# Patient Record
Sex: FEMALE | Race: WHITE | NOT HISPANIC OR LATINO | Employment: FULL TIME | URBAN - METROPOLITAN AREA
[De-identification: names, ages, dates, MRNs, and addresses within clinical notes are randomized per-mention and may not be internally consistent; named-entity substitution may affect disease eponyms.]

---

## 2018-01-02 ENCOUNTER — OFFICE VISIT CONVERTED (OUTPATIENT)
Dept: FAMILY MEDICINE CLINIC | Facility: CLINIC | Age: 23
End: 2018-01-02
Attending: NURSE PRACTITIONER

## 2018-01-02 ENCOUNTER — CONVERSION ENCOUNTER (OUTPATIENT)
Dept: FAMILY MEDICINE CLINIC | Facility: CLINIC | Age: 23
End: 2018-01-02

## 2018-03-19 ENCOUNTER — OFFICE VISIT CONVERTED (OUTPATIENT)
Dept: FAMILY MEDICINE CLINIC | Facility: CLINIC | Age: 23
End: 2018-03-19
Attending: NURSE PRACTITIONER

## 2018-03-19 ENCOUNTER — CONVERSION ENCOUNTER (OUTPATIENT)
Dept: FAMILY MEDICINE CLINIC | Facility: CLINIC | Age: 23
End: 2018-03-19

## 2018-10-03 ENCOUNTER — OFFICE VISIT CONVERTED (OUTPATIENT)
Dept: FAMILY MEDICINE CLINIC | Facility: CLINIC | Age: 23
End: 2018-10-03
Attending: NURSE PRACTITIONER

## 2018-10-03 ENCOUNTER — CONVERSION ENCOUNTER (OUTPATIENT)
Dept: FAMILY MEDICINE CLINIC | Facility: CLINIC | Age: 23
End: 2018-10-03

## 2018-11-27 ENCOUNTER — OFFICE VISIT CONVERTED (OUTPATIENT)
Dept: FAMILY MEDICINE CLINIC | Facility: CLINIC | Age: 23
End: 2018-11-27
Attending: NURSE PRACTITIONER

## 2019-09-04 ENCOUNTER — HOSPITAL ENCOUNTER (OUTPATIENT)
Dept: URGENT CARE | Facility: CLINIC | Age: 24
Discharge: HOME | End: 2019-09-04
Attending: NURSE PRACTITIONER

## 2019-09-07 LAB
BACTERIA SPEC AEROBE CULT: ABNORMAL
CIPROFLOXACIN SUSC ISLT: <=0.5
CLINDAMYCIN SUSC ISLT: 0.25
DAPTOMYCIN SUSC ISLT: 0.25
DOXYCYCLINE SUSC ISLT: <=0.5
ERYTHROMYCIN SUSC ISLT: <=0.25
GENTAMICIN SUSC ISLT: <=0.5
LEVOFLOXACIN SUSC ISLT: <=0.12
OXACILLIN SUSC ISLT: <=0.25
RIFAMPIN SUSC ISLT: <=0.5
TETRACYCLINE SUSC ISLT: <=1
TMP SMX SUSC ISLT: <=10
VANCOMYCIN SUSC ISLT: 1

## 2019-11-29 ENCOUNTER — HOSPITAL ENCOUNTER (OUTPATIENT)
Dept: URGENT CARE | Facility: CLINIC | Age: 24
Discharge: HOME | End: 2019-11-29
Attending: PHYSICIAN ASSISTANT

## 2019-12-02 LAB — BACTERIA SPEC AEROBE CULT: NORMAL

## 2020-03-20 ENCOUNTER — HOSPITAL ENCOUNTER (OUTPATIENT)
Dept: URGENT CARE | Facility: CLINIC | Age: 25
Discharge: HOME | End: 2020-03-20

## 2020-03-22 LAB — BACTERIA UR CULT: NORMAL

## 2020-11-03 ENCOUNTER — HOSPITAL ENCOUNTER (OUTPATIENT)
Dept: URGENT CARE | Facility: CLINIC | Age: 25
Discharge: HOME | End: 2020-11-03

## 2020-11-06 LAB — SARS-COV-2 RNA SPEC QL NAA+PROBE: NOT DETECTED

## 2020-12-06 ENCOUNTER — HOSPITAL ENCOUNTER (OUTPATIENT)
Dept: URGENT CARE | Facility: CLINIC | Age: 25
Discharge: HOME | End: 2020-12-06
Attending: FAMILY MEDICINE

## 2020-12-07 LAB
C TRACH RRNA CVX QL NAA+PROBE: NOT DETECTED
CONV HIV-1/ HIV-2: NONREACTIVE
N GONORRHOEA DNA SPEC QL NAA+PROBE: NOT DETECTED
RPR SER QL: NORMAL

## 2020-12-08 LAB
CONV HEPATITIS B SURFACE AG W CONFIRMATION RE: NEGATIVE
HAV IGM SERPL QL IA: NEGATIVE
HBV CORE IGM SERPL QL IA: NEGATIVE
HCV AB SER DONR QL: <0.1 S/CO RATIO (ref 0–0.9)

## 2020-12-09 LAB — BACTERIA UR CULT: NORMAL

## 2021-01-27 ENCOUNTER — HOSPITAL ENCOUNTER (OUTPATIENT)
Dept: URGENT CARE | Facility: CLINIC | Age: 26
Discharge: HOME | End: 2021-01-27
Attending: EMERGENCY MEDICINE

## 2021-01-27 LAB — MONONUCLEOSIS TEST, QUAL: NEGATIVE

## 2021-01-28 ENCOUNTER — OFFICE VISIT CONVERTED (OUTPATIENT)
Dept: FAMILY MEDICINE CLINIC | Facility: CLINIC | Age: 26
End: 2021-01-28
Attending: NURSE PRACTITIONER

## 2021-01-28 LAB — BACTERIA SPEC AEROBE CULT: ABNORMAL

## 2021-03-03 ENCOUNTER — TELEMEDICINE CONVERTED (OUTPATIENT)
Dept: FAMILY MEDICINE CLINIC | Facility: CLINIC | Age: 26
End: 2021-03-03
Attending: NURSE PRACTITIONER

## 2021-03-15 ENCOUNTER — HOSPITAL ENCOUNTER (OUTPATIENT)
Dept: URGENT CARE | Facility: CLINIC | Age: 26
Discharge: HOME | End: 2021-03-15
Attending: PHYSICIAN ASSISTANT

## 2021-04-28 ENCOUNTER — CONVERSION ENCOUNTER (OUTPATIENT)
Dept: FAMILY MEDICINE CLINIC | Facility: CLINIC | Age: 26
End: 2021-04-28

## 2021-04-28 ENCOUNTER — OFFICE VISIT CONVERTED (OUTPATIENT)
Dept: FAMILY MEDICINE CLINIC | Facility: CLINIC | Age: 26
End: 2021-04-28
Attending: NURSE PRACTITIONER

## 2021-04-28 LAB
BILIRUB UR QL STRIP: NEGATIVE
COLOR UR: YELLOW
CONV CLARITY OF URINE: CLEAR
CONV PROTEIN IN URINE BY AUTOMATED TEST STRIP: NEGATIVE
CONV UROBILINOGEN IN URINE BY AUTOMATED TEST STRIP: NORMAL
GLUCOSE UR QL: NEGATIVE
HGB UR QL STRIP: NEGATIVE
KETONES UR QL STRIP: NEGATIVE
LEUKOCYTE ESTERASE UR QL STRIP: NORMAL
NITRITE UR QL STRIP: NEGATIVE
PH UR STRIP.AUTO: 7 [PH]
SP GR UR: 1.02

## 2021-04-29 ENCOUNTER — HOSPITAL ENCOUNTER (OUTPATIENT)
Dept: FAMILY MEDICINE CLINIC | Facility: CLINIC | Age: 26
Discharge: HOME | End: 2021-04-29
Attending: NURSE PRACTITIONER

## 2021-04-29 LAB
APPEARANCE UR: CLEAR
BILIRUB UR QL: NEGATIVE
C TRACH RRNA CVX QL NAA+PROBE: NOT DETECTED
COLOR UR: YELLOW
CONV COLLECTION SOURCE (UA): NORMAL
CONV UROBILINOGEN IN URINE BY AUTOMATED TEST STRIP: 0.2 {EHRLICHU}/DL (ref 0.1–1)
GLUCOSE UR QL: NEGATIVE MG/DL
HGB UR QL STRIP: NEGATIVE
KETONES UR QL STRIP: NEGATIVE MG/DL
LEUKOCYTE ESTERASE UR QL STRIP: NEGATIVE
N GONORRHOEA DNA SPEC QL NAA+PROBE: NOT DETECTED
NITRITE UR QL STRIP: NEGATIVE
PH UR STRIP.AUTO: 7 [PH] (ref 5–8)
PROT UR QL: NEGATIVE MG/DL
SP GR UR: 1.02 (ref 1–1.03)

## 2021-05-01 LAB
BACTERIA SPEC AEROBE CULT: NORMAL
BACTERIA UR CULT: NORMAL

## 2021-05-03 LAB
CONV LAST MENSTURAL PERIOD: NORMAL
SPECIMEN SOURCE: NORMAL
SPECIMEN SOURCE: NORMAL
THIN PREP CVX: NORMAL

## 2021-05-10 NOTE — H&P
History and Physical      Patient Name: Shruti Pearl   Patient ID: [id number]   Sex: Female   YOB: 1995    Primary Care Provider: Nicole YUNG   Referring Provider: Nicole YUNG    Visit Date: January 28, 2021    Provider: DILSHAD Odell   [place] [facility] [place]   [place] Address: [address] [place] [address], [address]  [place], [place]  [id number]   Location Phone: [phone]          Chief Complaint  · new patient      History Of Present Illness  Shruti Pearl is a 25 year old /White,  or  female who presents for evaluation and treatment of:      Pt is here to establish care with a new provider, she was a patient of [facility] whom she has not seen in a little over a year. She had her pap smear with that office a year and half ago-WNL. She is willing to radha today.     Labs: Pt is unsure when the last time she had lab work was states probably about a yr ago.    Anxiety/Depression: Pt was seeing [facility] for her anxiety and depression needs. She was told at the beginning of the month that she was being released from their care for missing appoinments. She does need refills on her medications that was prescribed by the practice. She is taking Lexapro and Remeron for depression and insomnia. She feels the dose of Remeron is too high states it takes long to wear off she is groggy in the morning. Decreased dose of Remeron to 7.5mg.    Pt would like to discuss starting birth control. She has been on depo, nexplan and nuva ring in the past. Prescribed Ortho-Tri-Cyclen.    Pt would like to receive her flu vaccine today-administered.           Past Medical History  Disease Name Date Onset Notes   Allergies --  --    Anxiety --  --    Asthma --  --    Bipolar disorder --  --    Depression --  --          Past Surgical History  Procedure Name Date Notes   Cesarian Section --  --          Medication List  Name Date Started Instructions   Lexapro 10 mg  "oral tablet 01/28/2021 take 1 tablet (10 mg) by oral route once daily for 90 days   mirtazapine 7.5 mg oral tablet 01/28/2021 take 1 tablet (7.5 mg) by oral route once daily at bedtime         Allergy List  Allergen Name Date Reaction Notes   amoxicillin --  hives --    erythromycin --  --  stomach cramps   Macrobid --  --  --    PENICILLINS --  --  --          Family Medical History  Disease Name Relative/Age Notes   Diabetes Grandfather (maternal)/  Uncle/   --          Reproductive History  Menstrual   Last Menstrual Period: 01/14/2018         Social History  Finding Status Start/Stop Quantity Notes   Alcohol Never --/-- --  --    Sedentary --  --/-- --  --    Tobacco Former 16/18 4 cigarette day --          Review of Systems  · Constitutional  o Denies  o : fatigue, night sweats  · Eyes  o Denies  o : double vision, blurred vision  · HENT  o Denies  o : vertigo, recent head injury  · Breasts  o Denies  o : abnormal changes in breast size, additional breast symptoms except as noted in the HPI  · Cardiovascular  o Denies  o : chest pain, irregular heart beats  · Respiratory  o Denies  o : shortness of breath, productive cough  · Gastrointestinal  o Denies  o : nausea, vomiting  · Genitourinary  o Denies  o : dysuria, urinary retention  · Integument  o Denies  o : hair growth change, new skin lesions  · Neurologic  o Denies  o : altered mental status, seizures  · Musculoskeletal  o Denies  o : joint swelling, limitation of motion  · Endocrine  o Denies  o : cold intolerance, heat intolerance  · Psychiatric  o Admits  o : anxiety, depression  · Heme-Lymph  o Denies  o : petechiae, lymph node enlargement or tenderness  · Allergic-Immunologic  o Denies  o : frequent illnesses      Vitals  Date Time BP Position Site L\R Cuff Size HR RR TEMP (F) WT  HT  BMI kg/m2 BSA m2 O2 Sat FR L/min FiO2 HC       01/28/2021 11:46 /92 Sitting    95 - R   183lbs 4oz 5'  6\" 29.58 1.97 97 %            Physical " Examination  · Constitutional  o Appearance  o : alert, in no acute distress, well developed, well-nourished  · Eyes  o Vision  o : Conjuntivae: Normal, Sclerae white, Pupils: PERRL, Cornea: Clear, no lesions bilateral  · Ears, Nose, Mouth and Throat  o Ears  o : Ext. Ears: Normal shape, Non tender, EACs: Normal , TMs: Normal, Hearing: intact to conversational voice bilaterally  o Nose  o : No nasal discharge, Mucosa: normal, Septum: midline, Sinuses: Nontender  o Throat  o : Oropharynx: no inflmation or lesions, Tonsils: within normal limits  · Neck  o Inspection/Palpation  o : Supple, no masses or tenderness, no deformities, Trachea: Midline, ROM: with in normal limits, no neck stiffness, no lymphadenopathy  o Thyroid  o : no thyomegaly, no palpabale masses   · Respiratory  o Auscultation of Lungs  o : normal breath sounds throughout  · Cardiovascular  o Heart  o : Regular rate and rhythm, Normal S1,S2 , No cardiac murmers, No S3 or S4 gallop or rubs  · Skin and Subcutaneous Tissue  o General Inspection  o : no rashes , or lesions present, normal skin color, warm and dry  o Digits and Nails  o : no clubbing, cyanosis, deformities or edema present, normal appearing nails  · Neurologic  o Mental Status Examination  o : alert and oriented to time, place, and person. Gait and Station: normal gait, able to stand without difficulty  · Psychiatric  o Judgement and Insight  o : judgment and insight intact  o Mood and Affect  o : normal mood and affect          Assessment  · Allergic rhinitis due to allergen     477.9/J30.9  · Anxiety disorder     300.00/F41.9  · Asthma     493.90/J45.909  · Depression     311/F32.9  · Screening for depression     V79.0/Z13.89  · Need for influenza vaccination     V04.81/Z23  · Screening for lipid disorders     V77.91/Z13.220  · Establishing care with new doctor, encounter for     V65.8/Z76.89  · Screening for thyroid disorder     V77.0/Z13.29  · Routine lab draw     V72.60/Z01.89  · Birth  control counseling     V25.09/Z30.09    Problems Reconciled  Plan  · Orders  o ACO-18: Positive screen for clinical depression using a standardized tool and a follow-up plan documented () - V79.0/Z13.89 - 01/31/2021  o Immunization Admin Fee (Single) ([facility]) (85735) - V04.81/Z23 - 01/28/2021  o Fluzone Quadrivalent Vaccine, age 6 months + (01616) - V04.81/Z23 - 01/28/2021   Vaccine - Influenza; Dose: 0.5; Site: Right Deltoid; Route: Intramuscular; Date: 01/28/2021 12:32:00; Exp: 06/30/2020; Lot: [id number]; Mfg: sanofi pasteur; TradeName: Fluzone Quadrivalent; Administered By: Lisa Medina MA; Comment: N/A  o Physical, Primary Care Panel (CBC, CMP, Lipid, TSH) [facility] (93244, 10836, 03024, 21150) - V77.0/Z13.29, V77.91/Z13.220, V72.60/Z01.89 - 01/28/2021  o Vitamin D (25-Hydroxy) Level (28464) - V72.60/Z01.89 - 01/28/2021  o ACO-18: Positive screen for clinical depression using a standardized tool and a follow-up plan documented () - - 01/28/2021  o ACO-14: Influenza immunization administered or previously received [facility] () - - 01/28/2021  o ACO-39: Current medications updated and reviewed (, 1159F) - - 01/28/2021  o Vitamin B12 level (54306) - V72.60/Z01.89 - 01/28/2021  · Medications  o Ortho Tri-Cyclen (28) 0.18/0.215/0.25 mg-35 mcg (28) oral tablet   SIG: take 1 tablet by oral route once daily   DISP: (28) Tablet with 11 refills  Prescribed on 01/28/2021     o Lexapro 10 mg oral tablet   SIG: take 1 tablet (10 mg) by oral route once daily for 90 days   DISP: (90) Tablet with 0 refills  Adjusted on 01/28/2021     o mirtazapine 7.5 mg oral tablet   SIG: take 1 tablet (7.5 mg) by oral route once daily at bedtime   DISP: (30) Tablet with 0 refills  Adjusted on 01/28/2021     o Medications have been Reconciled  o Transition of Care or Provider Policy  · Instructions  o Depression Screen completed and scanned into the EMR under the designated folder within the patient's documents.  o Today's PHQ-9 result is  __13_  o Patient was educated/instructed on their diagnosis, treatment and medications prior to discharge from the clinic today.  o Patient instructed to seek medical attention urgently for new or worsening symptoms.  o Call the office with any concerns or questions.  o 1 month telehealth  o Electronically Identified Patient Education Materials Provided Electronically  · Disposition  o Call or Return if symptoms worsen or persist.            Electronically Signed by: DILSHAD Odell -Author on February 7, 2021 09:35:33 PM

## 2021-05-14 VITALS
WEIGHT: 183.25 LBS | OXYGEN SATURATION: 97 % | BODY MASS INDEX: 29.45 KG/M2 | DIASTOLIC BLOOD PRESSURE: 92 MMHG | HEIGHT: 66 IN | SYSTOLIC BLOOD PRESSURE: 133 MMHG | HEART RATE: 95 BPM

## 2021-05-14 VITALS
WEIGHT: 191.12 LBS | OXYGEN SATURATION: 100 % | DIASTOLIC BLOOD PRESSURE: 65 MMHG | BODY MASS INDEX: 30.71 KG/M2 | SYSTOLIC BLOOD PRESSURE: 114 MMHG | HEART RATE: 65 BPM | HEIGHT: 66 IN

## 2021-05-14 NOTE — PROGRESS NOTES
Progress Note      Patient Name: Shruti Pealr   Patient ID: [id number]   Sex: Female   YOB: 1995    Primary Care Provider: Nicole YUNG   Referring Provider: Nicole YUNG    Visit Date: April 28, 2021    Provider: DILSHAD Odell   Location: [facility] [place]   Location Address: [address] [place] [address], [address]  [place]  [id number]   Location Phone: [phone]          Chief Complaint  · Annual Exam  · PAP exam  · (Health Maintainence Information Reviewed Under Results)      History Of Present Illness  Last PAP Smear: 07/27/2017.   No current complaints.      Pt states that she thinks she might BV, having white cloudy discharge and smell for over a week. Pt states that she does get them every 2-3 months. States hx of bacterial vaginosis. She has been treated w/Flagyl. She would like to be screened for STDs while in the office today. U/A performed in the office positive for trace leukocytes. Sent off for culture. Admits she has a new partner and would like to be screened for STDs-ordered today.       Past Medical History  Disease Name Date Onset Notes   Allergies --  --    Anxiety --  --    Asthma --  --    Bipolar disorder --  --    Depression --  --          Past Surgical History  Procedure Name Date Notes   Cesarian Section --  --          Medication List  Name Date Started Instructions   Lexapro 10 mg oral tablet 03/03/2021 take 1 tablet (10 mg) by oral route once daily for 90 days   mirtazapine 7.5 mg oral tablet 03/03/2021 take 1 tablet (7.5 mg) by oral route once daily at bedtime for 90 days   Ortho Tri-Cyclen (28) 0.18/0.215/0.25 mg-35 mcg (28) oral tablet 01/28/2021 take 1 tablet by oral route once daily         Allergy List  Allergen Name Date Reaction Notes   amoxicillin --  hives --    erythromycin --  --  stomach cramps   Macrobid --  --  --    PENICILLINS --  --  --        Allergies Reconciled  Family Medical History  Disease Name Relative/Age Notes  "  Diabetes Grandfather (maternal)/  Uncle/   --          Reproductive History  Menstrual   Last Menstrual Period: 01/14/2018         Social History  Finding Status Start/Stop Quantity Notes   Alcohol Never --/-- --  --    Sedentary --  --/-- --  --    Tobacco Former 16/18 4 cigarette day --          Immunizations  NameDate Admin Mfg Trade Name Lot Number Route Inj VIS Given VIS Publication   Ewvhrgydc24/28/2021 [facility] Fluzone Quadrivalent [id number] IM RD 01/28/2021 08/15/2019   Comments:          Review of Systems  · Constitutional  o Denies  o : fatigue, night sweats  · Eyes  o Denies  o : double vision, blurred vision  · HENT  o Denies  o : vertigo, recent head injury  · Breasts  o Denies  o : abnormal changes in breast size, additional breast symptoms except as noted in the HPI  · Cardiovascular  o Denies  o : chest pain, irregular heart beats  · Respiratory  o Denies  o : shortness of breath, productive cough  · Gastrointestinal  o Denies  o : nausea, vomiting  · Genitourinary  o Admits  o : vaginal discharge  o Denies  o : dysuria, urinary retention  · Integument  o Denies  o : hair growth change, new skin lesions  · Neurologic  o Denies  o : altered mental status, seizures  · Musculoskeletal  o Denies  o : joint swelling, limitation of motion  · Endocrine  o Denies  o : cold intolerance, heat intolerance  · Heme-Lymph  o Denies  o : petechiae, lymph node enlargement or tenderness  · Allergic-Immunologic  o Denies  o : frequent illnesses      Vitals  Date Time BP Position Site L\R Cuff Size HR RR TEMP (F) WT  HT  BMI kg/m2 BSA m2 O2 Sat FR L/min FiO2 HC       01/28/2021 11:46 /92 Sitting    95 - R   183lbs 4oz 5'  6\" 29.58 1.97 97 %      04/28/2021 09:58 /65 Sitting    65 - R   191lbs 2oz 5'  6\" 30.85 2.01 100 %  21%          Physical Examination  · Constitutional  o Appearance  o : well-nourished, in no acute distress  · Neck  o Inspection/Palpation  o : normal appearance, no masses or tenderness, " trachea midline  o Thyroid  o : gland size normal, nontender, no nodules or masses present on palpation  · Respiratory  o Respiratory Effort  o : breathing unlabored  o Inspection of Chest  o : normal appearance  o Auscultation of Lungs  o : normal breath sounds throughout  · Cardiovascular  o Heart  o :   § Auscultation of Heart  § : regular rate and rhythm, no murmurs, gallops or rubs  · Breasts  o Inspection of Breasts  o : breasts symmetrical, no skin changes, no deformities present, no discharge present  o Palpation of Breasts, Axillae  o : no masses present on palpation, no breast tenderness  · Gastrointestinal  o Abdominal Examination  o : abdomen nontender to palpation, tone normal without rigidity or guarding, no masses present, normal bowel sounds  · Genitourinary  o External Genitalia  o : no inflammation, no lesions present  o Vagina  o : normal vaginal vault, no discharge present, no inflammatory lesions present, no masses present  o Bladder  o : nontender to palpation  o Cervix  o : appearance healthy, no lesions present, nontender to palpation, no discharges, no bleeding present, normal midline position  o Uterus  o : nontender to palpation, no masses present, position midline/midplane  o Adnexa  o : no tenderness or masses present on bimanual examination  o Anus  o : no inflammation or lesions present  o Perineum  o : perineum within normal limits  · Lymphatic  o Neck  o : no lymphadenopathy present  o Axilla  o : no lymphadenopathy present  o Groin  o : no lymphadenopathy present  · Neurologic  o Mental Status Examination  o :   § Orientation  § : grossly oriented to person, place and time  o Gait and Station  o : normal gait, able to stand without difficulty  · Psychiatric  o Judgment and Insight  o : judgment and insight intact  o Mood and Affect  o : mood normal, affect appropriate          Results  · In-Office Procedures  o Lab procedure  § IOP - Urinalysis without Microscopy (Clinitek) [facility]  (97039)   § Color Ur: Yellow   § Clarity Ur: Clear   § Glucose Ur Ql Strip: Negative   § Bilirub Ur Ql Strip: Negative   § Ketones Ur Ql Strip: Negative   § Sp Gr Ur Qn: 1.025   § Hgb Ur Ql Strip: Negative   § pH Ur-LsCnc: 7.0   § Prot Ur Ql Strip: Negative   § Urobilinogen Ur Strip-mCnc: 0.2 E.U./dL   § Nitrite Ur Ql Strip: Negative   § WBC Est Ur Ql Strip: Trace       Assessment  · Routine gynecological examination     V72.31/Z01.419  · Pap smear, as part of routine gynecological examination     V76.2/Z01.419  · Vaginal Discharge     623.5/N89.8  · Screening for lipid disorders     V77.91/Z13.220  · UTI (urinary tract infection)     599.0/N39.0  · Screening for STD (sexually transmitted disease)     V74.5/Z11.3  · Vaginal odor     625.8/N89.8  · Routine lab draw     V72.60/Z01.89  · Screening for thyroid disorder     V77.0/Z13.29      Plan  · Orders  o Vaginal Screen (Candida, Gardnerella & Trichomonas) (73111) - V72.31/Z01.419 - 04/28/2021  o Pap smear (91702) - V76.2/Z01.419 - 04/28/2021  o STD Panel (HSV 1 and 2 IgG/IgM, HIV, RPR, GC/Chlamydia) [facility] (88870, 31484, CTNGX, 83081, 33391, 21251, ) - V74.5/Z11.3 - 04/28/2021  o Urinalysis with Reflex Microscopy if abnormal ([facility]) (93236) - 599.0/N39.0 - 04/28/2021  o Vaginal culture (05549) - V74.5/Z11.3 - 04/28/2021  o Vaginal Screen (Candida, Gardnerella & Trichomonas) (35001) - V74.5/Z11.3 - 04/28/2021  o Urine Culture [facility] (50296) - 599.0/N39.0, 625.8/N89.8, 623.5/N89.8 - 04/28/2021  o Hepatitis panel (HBsAg, HBsAb, HBc IgM, HBe antigen and antibody) (60175, 73659, 25586, 94570, 60845) - V74.5/Z11.3 - 04/28/2021  o Physical, Primary Care Panel (CBC, CMP, Lipid, TSH) [facility] (76171, 36040, 37692, 35577) - V72.60/Z01.89, V77.0/Z13.29, V77.91/Z13.220 - 04/28/2021  o Vitamin B12 level (59344) - V72.60/Z01.89 - 04/28/2021  o Vitamin D Level (55723) - V72.60/Z01.89 - 04/28/2021  o Chlamydia/GC by PCR (Urine or Vaginal Swab) [facility] (CTNGX) - 625.8/N89.8, 623.5/N89.8 -  04/28/2021  o Herpes simplex virus (HSV) type 1 and 2 DNA panel by PCR (97217) - 625.8/N89.8, 623.5/N89.8 - 04/28/2021  · Medications  o Medications have been Reconciled  o Transition of Care or Provider Policy  · Instructions  o **Pap Test/Liquid Based:   o Thin Prep  o Source:  o Cervix  o **Perform Reflex Human Papilloma Virus (HPV) High Risk on this Pap (If atypical squamous cells of the undetermined signifigcance (ASCUS)/Atypical Glandular Cells of undetermined significance (AGCUS): Low Grade Squamous Intraepitheal lesion (LGSIL): **  o Yes, upon instruction from sending office  o **Is this an annual PAP:  o Yes  o Last Menstrual Period (First Day of): 03/20/2021  o Contraceptive: Birth Control  o Previous Pap date: 07/29/2017  o Normal  o Counseled on monthly breast self exams.   o Counseled on STD prevention.  o Counseled on diet and exercise.   o Counseled on weight-bearing exercise.  o Recommended Calcium with Vitamin D twice daily.  o 6 month follow up  o Electronically Identified Patient Education Materials Provided Electronically  · Disposition  o Call or Return if symptoms worsen or persist.            Electronically Signed by: DILSHAD Odell -Author on April 28, 2021 05:22:47 PM

## 2021-05-14 NOTE — PROGRESS NOTES
Progress Note      Patient Name: Shruti Pearl   Patient ID: [id number]   Sex: Female   YOB: 1995    Primary Care Provider: Nicole YUNG   Referring Provider: Nicole YUNG    Visit Date: March 3, 2021    Provider: DILSHAD Odell   Location: [facility] [place]   Location Address: [address] [place] [address], [address]  [place], [place]  [id number]   Location Phone: [phone]          Chief Complaint     follow up mirtazapine       History Of Present Illness  Video Conferencing Visit  Shruti Pearl is a 26 year old /White,  or  female who is presenting for evaluation via video conferencing via Briefcase. Verbal consent obtained before beginning visit.   The following staff were present during this visit: DILSHAD Odell      depression/anxiety/insomnia-last visit reduced dose of Mirtazepine to 7.5mg-pt complained that the 15mg dose made her too drowsy-she reports she is feeling great since the change in dose. Denies any side effects. She would like to continue the current dose-refilled Mirtazepine.    feels her mood is goo on Lexapro-she is requesting a rf-refilled today.       Past Medical History  Disease Name Date Onset Notes   Allergies --  --    Anxiety --  --    Asthma --  --    Bipolar disorder --  --    Depression --  --          Past Surgical History  Procedure Name Date Notes   Cesarian Section --  --          Medication List  Name Date Started Instructions   Lexapro 10 mg oral tablet 03/03/2021 take 1 tablet (10 mg) by oral route once daily for 90 days   mirtazapine 7.5 mg oral tablet 03/03/2021 take 1 tablet (7.5 mg) by oral route once daily at bedtime for 90 days   Ortho Tri-Cyclen (28) 0.18/0.215/0.25 mg-35 mcg (28) oral tablet 01/28/2021 take 1 tablet by oral route once daily         Allergy List  Allergen Name Date Reaction Notes   amoxicillin --  hives --    erythromycin --  --  stomach cramps   Macrobid --  --  --    PENICILLINS --  --   --          Family Medical History  Disease Name Relative/Age Notes   Diabetes Grandfather (maternal)/  Uncle/   --          Reproductive History  Menstrual   Last Menstrual Period: 01/14/2018         Social History  Finding Status Start/Stop Quantity Notes   Alcohol Never --/-- --  --    Sedentary --  --/-- --  --    Tobacco Former 16/18 4 cigarette day --          Immunizations  NameDate Admin Mfg Trade Name Lot Number Route Inj VIS Given VIS Publication   Nduilklit03/28/2021 [facility] Fluzone Quadrivalent [id number] RD 01/28/2021 08/15/2019   Comments:          Review of Systems  · Constitutional  o Denies  o : fatigue, night sweats  · Eyes  o Denies  o : double vision, blurred vision  · Cardiovascular  o Denies  o : chest pain, irregular heart beats  · Respiratory  o Denies  o : shortness of breath, productive cough  · Endocrine  o Denies  o : cold intolerance, heat intolerance  · Psychiatric  o Admits  o : anxiety, depression, difficulty sleeping      Physical Examination  · Constitutional  o Appearance  o : alert, in no acute distress, well developed, well-nourished  · Neurologic  o Mental Status Examination  o : alert and oriented to time, place, and person. Gait and Station: normal gait, able to stand without difficulty  · Psychiatric  o Judgement and Insight  o : judgment and insight intact  o Mood and Affect  o : normal mood and affect          Assessment  · Anxiety     300.00/F41.9  · Depression     311/F32.9  · Insomnia     780.52/G47.00      Plan  · Instructions  o 6 month follow up  · Disposition  o Call or Return if symptoms worsen or persist.            Electronically Signed by: DILSHAD Odell -Author on March 3, 2021 11:16:32 PM

## 2021-05-16 VITALS
SYSTOLIC BLOOD PRESSURE: 120 MMHG | HEART RATE: 84 BPM | BODY MASS INDEX: 31.28 KG/M2 | TEMPERATURE: 99.1 F | OXYGEN SATURATION: 98 % | WEIGHT: 183.25 LBS | DIASTOLIC BLOOD PRESSURE: 64 MMHG | HEIGHT: 64 IN

## 2021-05-16 VITALS
TEMPERATURE: 98.3 F | SYSTOLIC BLOOD PRESSURE: 113 MMHG | DIASTOLIC BLOOD PRESSURE: 78 MMHG | WEIGHT: 172.37 LBS | RESPIRATION RATE: 16 BRPM | HEART RATE: 72 BPM | OXYGEN SATURATION: 100 % | BODY MASS INDEX: 29.43 KG/M2 | HEIGHT: 64 IN

## 2021-05-16 VITALS
HEART RATE: 82 BPM | WEIGHT: 179.37 LBS | HEIGHT: 64 IN | SYSTOLIC BLOOD PRESSURE: 111 MMHG | TEMPERATURE: 98.9 F | DIASTOLIC BLOOD PRESSURE: 74 MMHG | OXYGEN SATURATION: 100 % | BODY MASS INDEX: 30.62 KG/M2

## 2021-05-16 VITALS
WEIGHT: 174.12 LBS | BODY MASS INDEX: 29.73 KG/M2 | HEIGHT: 64 IN | TEMPERATURE: 98.8 F | HEART RATE: 74 BPM | DIASTOLIC BLOOD PRESSURE: 60 MMHG | OXYGEN SATURATION: 99 % | SYSTOLIC BLOOD PRESSURE: 102 MMHG

## 2021-09-07 ENCOUNTER — HOSPITAL ENCOUNTER (EMERGENCY)
Facility: HOSPITAL | Age: 26
Discharge: HOME | End: 2021-09-08
Attending: EMERGENCY MEDICINE | Admitting: EMERGENCY MEDICINE

## 2021-09-07 ENCOUNTER — APPOINTMENT (OUTPATIENT)
Dept: CT IMAGING | Facility: HOSPITAL | Age: 26
End: 2021-09-07

## 2021-09-07 DIAGNOSIS — K52.9 ENTERITIS: Primary | ICD-10-CM

## 2021-09-07 DIAGNOSIS — R10.31 RIGHT LOWER QUADRANT ABDOMINAL PAIN: ICD-10-CM

## 2021-09-07 DIAGNOSIS — R19.7 DIARRHEA, UNSPECIFIED TYPE: ICD-10-CM

## 2021-09-07 DIAGNOSIS — R11.2 NON-INTRACTABLE VOMITING WITH NAUSEA, UNSPECIFIED VOMITING TYPE: ICD-10-CM

## 2021-09-07 LAB
ALBUMIN SERPL-MCNC: 3.9 G/DL (ref 3.5–5.2)
ALBUMIN/GLOB SERPL: 1.3 G/DL
ALP SERPL-CCNC: 50 U/L (ref 39–117)
ALT SERPL W P-5'-P-CCNC: 19 U/L (ref 1–33)
ANION GAP SERPL CALCULATED.3IONS-SCNC: 11 MMOL/L (ref 5–15)
AST SERPL-CCNC: 18 U/L (ref 1–32)
BACTERIA UR QL AUTO: ABNORMAL /HPF
BASOPHILS # BLD AUTO: 0.01 10*3/MM3 (ref 0–0.2)
BASOPHILS NFR BLD AUTO: 0.1 % (ref 0–1.5)
BILIRUB SERPL-MCNC: 1.6 MG/DL (ref 0–1.2)
BILIRUB UR QL STRIP: NEGATIVE
BUN SERPL-MCNC: 10 MG/DL (ref 6–20)
BUN/CREAT SERPL: 15.4 (ref 7–25)
CALCIUM SPEC-SCNC: 9 MG/DL (ref 8.6–10.5)
CHLORIDE SERPL-SCNC: 106 MMOL/L (ref 98–107)
CLARITY UR: ABNORMAL
CO2 SERPL-SCNC: 21 MMOL/L (ref 22–29)
COLOR UR: YELLOW
CREAT SERPL-MCNC: 0.65 MG/DL (ref 0.57–1)
DEPRECATED RDW RBC AUTO: 36.6 FL (ref 37–54)
EOSINOPHIL # BLD AUTO: 0.11 10*3/MM3 (ref 0–0.4)
EOSINOPHIL NFR BLD AUTO: 1.2 % (ref 0.3–6.2)
ERYTHROCYTE [DISTWIDTH] IN BLOOD BY AUTOMATED COUNT: 12.1 % (ref 12.3–15.4)
GFR SERPL CREATININE-BSD FRML MDRD: 111 ML/MIN/1.73
GLOBULIN UR ELPH-MCNC: 2.9 GM/DL
GLUCOSE SERPL-MCNC: 88 MG/DL (ref 65–99)
GLUCOSE UR STRIP-MCNC: NEGATIVE MG/DL
HCG INTACT+B SERPL-ACNC: <0.5 MIU/ML
HCT VFR BLD AUTO: 41.5 % (ref 34–46.6)
HGB BLD-MCNC: 14.7 G/DL (ref 12–15.9)
HGB UR QL STRIP.AUTO: NEGATIVE
HOLD SPECIMEN: NORMAL
HOLD SPECIMEN: NORMAL
HYALINE CASTS UR QL AUTO: ABNORMAL /LPF
IMM GRANULOCYTES # BLD AUTO: 0.03 10*3/MM3 (ref 0–0.05)
IMM GRANULOCYTES NFR BLD AUTO: 0.3 % (ref 0–0.5)
KETONES UR QL STRIP: ABNORMAL
LEUKOCYTE ESTERASE UR QL STRIP.AUTO: ABNORMAL
LIPASE SERPL-CCNC: 26 U/L (ref 13–60)
LYMPHOCYTES # BLD AUTO: 1.48 10*3/MM3 (ref 0.7–3.1)
LYMPHOCYTES NFR BLD AUTO: 16.3 % (ref 19.6–45.3)
MCH RBC QN AUTO: 29.9 PG (ref 26.6–33)
MCHC RBC AUTO-ENTMCNC: 35.4 G/DL (ref 31.5–35.7)
MCV RBC AUTO: 84.5 FL (ref 79–97)
MONOCYTES # BLD AUTO: 0.51 10*3/MM3 (ref 0.1–0.9)
MONOCYTES NFR BLD AUTO: 5.6 % (ref 5–12)
NEUTROPHILS NFR BLD AUTO: 6.92 10*3/MM3 (ref 1.7–7)
NEUTROPHILS NFR BLD AUTO: 76.5 % (ref 42.7–76)
NITRITE UR QL STRIP: NEGATIVE
NRBC BLD AUTO-RTO: 0 /100 WBC (ref 0–0.2)
PH UR STRIP.AUTO: 5.5 [PH] (ref 5–8)
PLATELET # BLD AUTO: 180 10*3/MM3 (ref 140–450)
PMV BLD AUTO: 10.1 FL (ref 6–12)
POTASSIUM SERPL-SCNC: 3.5 MMOL/L (ref 3.5–5.2)
PROT SERPL-MCNC: 6.8 G/DL (ref 6–8.5)
PROT UR QL STRIP: NEGATIVE
RBC # BLD AUTO: 4.91 10*6/MM3 (ref 3.77–5.28)
RBC # UR: ABNORMAL /HPF
REF LAB TEST METHOD: ABNORMAL
SODIUM SERPL-SCNC: 138 MMOL/L (ref 136–145)
SP GR UR STRIP: 1.03 (ref 1–1.03)
SQUAMOUS #/AREA URNS HPF: ABNORMAL /HPF
UROBILINOGEN UR QL STRIP: ABNORMAL
WBC # BLD AUTO: 9.06 10*3/MM3 (ref 3.4–10.8)
WBC UR QL AUTO: ABNORMAL /HPF
WHOLE BLOOD HOLD SPECIMEN: NORMAL
WHOLE BLOOD HOLD SPECIMEN: NORMAL

## 2021-09-07 PROCEDURE — 99283 EMERGENCY DEPT VISIT LOW MDM: CPT

## 2021-09-07 PROCEDURE — 83690 ASSAY OF LIPASE: CPT

## 2021-09-07 PROCEDURE — 81001 URINALYSIS AUTO W/SCOPE: CPT

## 2021-09-07 PROCEDURE — 80053 COMPREHEN METABOLIC PANEL: CPT | Performed by: EMERGENCY MEDICINE

## 2021-09-07 PROCEDURE — 96374 THER/PROPH/DIAG INJ IV PUSH: CPT

## 2021-09-07 PROCEDURE — 74177 CT ABD & PELVIS W/CONTRAST: CPT

## 2021-09-07 PROCEDURE — 36415 COLL VENOUS BLD VENIPUNCTURE: CPT | Performed by: EMERGENCY MEDICINE

## 2021-09-07 PROCEDURE — 25010000002 KETOROLAC TROMETHAMINE PER 15 MG: Performed by: REGISTERED NURSE

## 2021-09-07 PROCEDURE — 84702 CHORIONIC GONADOTROPIN TEST: CPT

## 2021-09-07 PROCEDURE — 85025 COMPLETE CBC W/AUTO DIFF WBC: CPT

## 2021-09-07 RX ORDER — MIRTAZAPINE 15 MG/1
7.5 TABLET, FILM COATED ORAL
COMMUNITY
End: 2021-10-28 | Stop reason: SDUPTHER

## 2021-09-07 RX ORDER — ESCITALOPRAM OXALATE 10 MG/1
TABLET ORAL
COMMUNITY
Start: 2021-03-03 | End: 2021-10-28 | Stop reason: SDUPTHER

## 2021-09-07 RX ORDER — BUSPIRONE HYDROCHLORIDE 10 MG/1
10 TABLET ORAL 3 TIMES DAILY
COMMUNITY
End: 2022-04-28 | Stop reason: SDUPTHER

## 2021-09-07 RX ORDER — KETOROLAC TROMETHAMINE 30 MG/ML
30 INJECTION, SOLUTION INTRAMUSCULAR; INTRAVENOUS ONCE
Status: COMPLETED | OUTPATIENT
Start: 2021-09-07 | End: 2021-09-07

## 2021-09-07 RX ORDER — SODIUM CHLORIDE 0.9 % (FLUSH) 0.9 %
10 SYRINGE (ML) INJECTION AS NEEDED
Status: DISCONTINUED | OUTPATIENT
Start: 2021-09-07 | End: 2021-09-08 | Stop reason: HOSPADM

## 2021-09-07 RX ADMIN — SODIUM CHLORIDE 1000 ML: 9 INJECTION, SOLUTION INTRAVENOUS at 23:28

## 2021-09-07 RX ADMIN — KETOROLAC TROMETHAMINE 30 MG: 30 INJECTION, SOLUTION INTRAMUSCULAR at 23:28

## 2021-09-07 RX ADMIN — IOPAMIDOL 100 ML: 755 INJECTION, SOLUTION INTRAVENOUS at 23:59

## 2021-09-08 VITALS
SYSTOLIC BLOOD PRESSURE: 96 MMHG | OXYGEN SATURATION: 97 % | WEIGHT: 197.53 LBS | HEIGHT: 64 IN | HEART RATE: 80 BPM | BODY MASS INDEX: 33.72 KG/M2 | TEMPERATURE: 99.3 F | DIASTOLIC BLOOD PRESSURE: 66 MMHG | RESPIRATION RATE: 16 BRPM

## 2021-09-08 PROCEDURE — 0 IOPAMIDOL PER 1 ML: Performed by: EMERGENCY MEDICINE

## 2021-09-08 RX ORDER — DICYCLOMINE HCL 20 MG
20 TABLET ORAL EVERY 8 HOURS
Qty: 15 TABLET | Refills: 0 | Status: SHIPPED | OUTPATIENT
Start: 2021-09-08 | End: 2021-09-13

## 2021-09-08 RX ORDER — ONDANSETRON 4 MG/1
4 TABLET, ORALLY DISINTEGRATING ORAL EVERY 8 HOURS PRN
Qty: 30 TABLET | Refills: 0 | Status: SHIPPED | OUTPATIENT
Start: 2021-09-08 | End: 2021-09-18

## 2021-09-08 NOTE — DISCHARGE INSTRUCTIONS
Drink plenty of fluids and rest.  Stick with a clear liquid diet for 24 hours, then advance it as tolerated.  You can take Zofran for nausea and Bentyl for abdominal cramping.  You may also add Tylenol or Motrin to relieve your pain.    Return if you can't control your vomiting, develop a fever, become dehydrated, have blood in your stool, or have any other new concerns.

## 2021-09-08 NOTE — ED PROVIDER NOTES
Time: 01:09 EDT  Arrived by: Car  Chief Complaint: Diarrhea, abdominal pain  History provided by: Patient      History of Present Illness:  Patient is a 26 y.o. year old female that presents to the emergency department with complaint of abdominal pain and diarrhea for 5 days.  Today she started to develop nausea and vomiting as well.  She is concerned that she may have appendicitis.       used: No    Abdominal Pain  Pain location:  RLQ  Pain quality: aching    Pain radiates to:  Does not radiate  Pain severity:  Mild  Onset quality:  Sudden  Timing:  Constant  Progression:  Waxing and waning  Chronicity:  New  Associated symptoms: diarrhea, nausea and vomiting    Associated symptoms: no chest pain, no chills, no cough, no dysuria, no fever, no hematuria, no shortness of breath and no sore throat    Diarrhea:     Severity:  Mild    Duration:  5 days    Timing:  Intermittent    Progression:  Improving  Nausea  The primary symptoms include abdominal pain, nausea, vomiting and diarrhea. Primary symptoms do not include fever or dysuria.   The illness does not include chills.   Diarrhea  The primary symptoms include abdominal pain, nausea, vomiting and diarrhea. Primary symptoms do not include fever or dysuria.   The illness does not include chills.           Similar Symptoms Previously: No  Recently seen: No      Patient Care Team  Primary Care Provider: Ms. Whitlock    Past Medical History:     Allergies   Allergen Reactions   • Nitrofurantoin Macrocrystal Hives   • Penicillins Hives     Past Medical History:   Diagnosis Date   • Allergies    • Anxiety    • Asthma    • Bipolar disorder (CMS/HCC)    • Depression    • Kidney stone      Past Surgical History:   Procedure Laterality Date   •  SECTION       Family History   Problem Relation Age of Onset   • Diabetes Maternal Grandfather    • Diabetes Other         UNCLE       Home Medications:  Prior to Admission medications    Not on File     "    Social History:   PT  reports that she has quit smoking. She smoked 4.00 packs per day. She has never used smokeless tobacco. She reports previous drug use. She reports that she does not drink alcohol.    Record Review:  I have reviewed the patient's records in [facility].     Review of Systems  Review of Systems   Constitutional: Negative for chills and fever.   HENT: Negative for congestion, ear pain and sore throat.    Eyes: Negative for pain.   Respiratory: Negative for cough, chest tightness and shortness of breath.    Cardiovascular: Negative for chest pain.   Gastrointestinal: Positive for abdominal pain, diarrhea, nausea and vomiting.   Genitourinary: Negative for dysuria, flank pain and hematuria.   Musculoskeletal: Negative for joint swelling.   Skin: Negative for pallor.   Neurological: Negative for seizures and headaches.   All other systems reviewed and are negative.       Physical Exam  BP 97/63   Pulse 74   Temp 99.3 °F (37.4 °C) (Oral)   Resp 16   Ht 162.6 cm (64\")   Wt 89.6 kg (197 lb 8.5 oz)   LMP 08/07/2021 (Approximate)   SpO2 97%   BMI 33.91 kg/m²     Physical Exam  Vitals and nursing note reviewed.   Constitutional:       General: She is not in acute distress.     Appearance: Normal appearance. She is not toxic-appearing.   HENT:      Head: Normocephalic and atraumatic.      Mouth/Throat:      Mouth: Mucous membranes are moist.   Eyes:      Extraocular Movements: Extraocular movements intact.      Pupils: Pupils are equal, round, and reactive to light.   Cardiovascular:      Rate and Rhythm: Normal rate and regular rhythm.      Pulses: Normal pulses.      Heart sounds: Normal heart sounds.   Pulmonary:      Effort: Pulmonary effort is normal. No respiratory distress.      Breath sounds: Normal breath sounds.   Abdominal:      General: Abdomen is flat. Bowel sounds are normal.      Palpations: Abdomen is soft. There is no mass or pulsatile mass.      Tenderness: There is abdominal " "tenderness in the right lower quadrant. There is rebound. There is no guarding.   Musculoskeletal:         General: Normal range of motion.      Cervical back: Normal range of motion and neck supple.   Skin:     General: Skin is warm and dry.   Neurological:      Mental Status: She is alert and oriented to person, place, and time. Mental status is at baseline.                  ED Course  BP 97/63   Pulse 74   Temp 99.3 °F (37.4 °C) (Oral)   Resp 16   Ht 162.6 cm (64\")   Wt 89.6 kg (197 lb 8.5 oz)   LMP 08/07/2021 (Approximate)   SpO2 97%   BMI 33.91 kg/m²   Results for orders placed or performed during the hospital encounter of 09/07/21   Comprehensive Metabolic Panel    Specimen: Blood   Result Value Ref Range    Glucose 88 65 - 99 mg/dL    BUN 10 6 - 20 mg/dL    Creatinine 0.65 0.57 - 1.00 mg/dL    Sodium 138 136 - 145 mmol/L    Potassium 3.5 3.5 - 5.2 mmol/L    Chloride 106 98 - 107 mmol/L    CO2 21.0 (L) 22.0 - 29.0 mmol/L    Calcium 9.0 8.6 - 10.5 mg/dL    Total Protein 6.8 6.0 - 8.5 g/dL    Albumin 3.90 3.50 - 5.20 g/dL    ALT (SGPT) 19 1 - 33 U/L    AST (SGOT) 18 1 - 32 U/L    Alkaline Phosphatase 50 39 - 117 U/L    Total Bilirubin 1.6 (H) 0.0 - 1.2 mg/dL    eGFR Non African Amer 111 >60 mL/min/1.73    Globulin 2.9 gm/dL    A/G Ratio 1.3 g/dL    BUN/Creatinine Ratio 15.4 7.0 - 25.0    Anion Gap 11.0 5.0 - 15.0 mmol/L   Lipase    Specimen: Blood   Result Value Ref Range    Lipase 26 13 - 60 U/L   Urinalysis With Microscopic If Indicated (No Culture) - Urine, Clean Catch    Specimen: Urine, Clean Catch   Result Value Ref Range    Color, UA Yellow Yellow, Straw    Appearance, UA Cloudy (A) Clear    pH, UA 5.5 5.0 - 8.0    Specific Gravity, UA 1.026 1.005 - 1.030    Glucose, UA Negative Negative    Ketones, UA 15 mg/dL (1+) (A) Negative    Bilirubin, UA Negative Negative    Blood, UA Negative Negative    Protein, UA Negative Negative    Leuk Esterase, UA Moderate (2+) (A) Negative    Nitrite, UA Negative " Negative    Urobilinogen, UA 0.2 E.U./dL 0.2 - 1.0 E.U./dL   hCG, Quantitative, Pregnancy    Specimen: Blood   Result Value Ref Range    HCG Quantitative <0.50 mIU/mL   CBC Auto Differential    Specimen: Blood   Result Value Ref Range    WBC 9.06 3.40 - 10.80 10*3/mm3    RBC 4.91 3.77 - 5.28 10*6/mm3    Hemoglobin 14.7 12.0 - 15.9 g/dL    Hematocrit 41.5 34.0 - 46.6 %    MCV 84.5 79.0 - 97.0 fL    MCH 29.9 26.6 - 33.0 pg    MCHC 35.4 31.5 - 35.7 g/dL    RDW 12.1 (L) 12.3 - 15.4 %    RDW-SD 36.6 (L) 37.0 - 54.0 fl    MPV 10.1 6.0 - 12.0 fL    Platelets 180 140 - 450 10*3/mm3    Neutrophil % 76.5 (H) 42.7 - 76.0 %    Lymphocyte % 16.3 (L) 19.6 - 45.3 %    Monocyte % 5.6 5.0 - 12.0 %    Eosinophil % 1.2 0.3 - 6.2 %    Basophil % 0.1 0.0 - 1.5 %    Immature Grans % 0.3 0.0 - 0.5 %    Neutrophils, Absolute 6.92 1.70 - 7.00 10*3/mm3    Lymphocytes, Absolute 1.48 0.70 - 3.10 10*3/mm3    Monocytes, Absolute 0.51 0.10 - 0.90 10*3/mm3    Eosinophils, Absolute 0.11 0.00 - 0.40 10*3/mm3    Basophils, Absolute 0.01 0.00 - 0.20 10*3/mm3    Immature Grans, Absolute 0.03 0.00 - 0.05 10*3/mm3    nRBC 0.0 0.0 - 0.2 /100 WBC   Urinalysis, Microscopic Only - Urine, Clean Catch    Specimen: Urine, Clean Catch   Result Value Ref Range    RBC, UA None Seen None Seen /HPF    WBC, UA 3-5 (A) None Seen /HPF    Bacteria, UA 1+ (A) None Seen /HPF    Squamous Epithelial Cells, UA 3-6 (A) None Seen, 0-2 /HPF    Hyaline Casts, UA None Seen None Seen /LPF    Methodology Manual Light Microscopy    Green Top (Gel)   Result Value Ref Range    Extra Tube Hold for add-ons.    Lavender Top   Result Value Ref Range    Extra Tube hold for add-on    Gold Top - SST   Result Value Ref Range    Extra Tube Hold for add-ons.    Light Blue Top   Result Value Ref Range    Extra Tube hold for add-on      Medications   sodium chloride 0.9 % flush 10 mL (has no administration in time range)   sodium chloride 0.9 % bolus 1,000 mL (1,000 mL Intravenous New Bag 9/7/21  2328)   ketorolac (TORADOL) injection 30 mg (30 mg Intravenous Given 9/7/21 2328)   iopamidol (ISOVUE-370) 76 % injection 100 mL (100 mL Intravenous Given 9/7/21 4379)     CT Abdomen Pelvis With Contrast    Result Date: 9/8/2021  Narrative: PROCEDURE: CT ABDOMEN PELVIS W CONTRAST  COMPARISON: 2/19/2020.  INDICATIONS: ABD PAIN, VOMITING, DIARRHEA,  RIGHT, MID TO LOWER ABDOMEN PAIN.  TECHNIQUE: After obtaining the patient's consent, 659 CT images were created with non-ionic intravenous contrast material.  No oral contrast agent was administered for the study.  PROTOCOL:   Standard imaging protocol performed    RADIATION:   DLP: 561.2 mGy*cm   Automated exposure control was utilized to minimize radiation dose. CONTRAST: 100cc Isovue 370 I.V.  FINDINGS: New circumferential mural thickening involves the distal ileum, including the terminal ileum.  The findings may represent age-indeterminate infectious/inflammatory enteritis, such as Crohn's disease.  Inflammatory stranding is seen about the thickened small bowel.  There are nonspecific, but possibly reactive, small to moderate sized mesenteric lymph nodes.  No mechanical bowel obstruction is suspected.  No pneumoperitoneum or pneumatosis.  No portal or mesenteric venous gas is seen.  The remainder of the small bowel is unaffected.  No acute appendicitis or colitis.  There are colonic diverticula without acute diverticulitis.  A right adnexal cyst is seen, which measures about 4 cm, as on image 87 of series 20.  It is new since the prior CT exam.  It may contain a fluid-fluid interface.  It may be hemorrhagic as with an involuting functional right ovarian cyst.  A small amount of free fluid is seen in the cul-de-sac with CT numbers of approximately 2 Hounsfield units or less.  No suspicious uterine or left adnexal mass.  No other acute findings are appreciated.  CONCLUSION:   1. New circumferential mural thickening involves the distal ileum, including the terminal ileum,  and is suggestive of an age-indeterminate infectious/inflammatory enteritis, such as Crohn's disease.  No pneumatosis or pneumoperitoneum.  No portal or mesenteric venous gas.  No definite abscess is seen in the right lower quadrant.  No mechanical bowel obstruction.   2. No acute appendicitis.   3. There may be a 4 cm hemorrhagic right adnexal cyst with a small amount of free fluid in the cul-de-sac.   4. There is diffuse colonic diverticulosis without acute diverticulitis.    SARA JHAVERI JR, MD       Electronically Signed and Approved By: SARA JHAVERI JR, MD on 9/08/2021 at 0:52               Procedures/EKGs:  Procedures      Medical Decision Making:                     MDM  Number of Diagnoses or Management Options  Diarrhea, unspecified type: new and requires workup  Enteritis: new and requires workup  Non-intractable vomiting with nausea, unspecified vomiting type: new and requires workup  Right lower quadrant abdominal pain: new and requires workup  Diagnosis management comments: The patient presents with vomiting and diarrhea consistent with gastroenteritis. The patient has a soft and benign abdominal exam. The patient is now resting comfortably and feels better, is alert, and is in no distress. The patient is able to tolerate po intake in the ED. The patient´s labs were reviewed and hydration status was assessed. The patient has no signs of acute renal failure, sepsis, or dehydration that warrants admission to the hospital. The vital signs have been stable. The patient's condition is stable and appropriate for discharge. The patient will pursue further outpatient evaluation with the primary care physician or designated physician. The patient and/or caregivers have expressed a clear and thorough understanding and agreed to follow-up as instructed.       Amount and/or Complexity of Data Reviewed  Clinical lab tests: reviewed and ordered  Tests in the radiology section of CPT®: reviewed and ordered    Risk  of Complications, Morbidity, and/or Mortality  Presenting problems: low  Diagnostic procedures: low  Management options: low         Final diagnoses:   Right lower quadrant abdominal pain   Diarrhea, unspecified type   Non-intractable vomiting with nausea, unspecified vomiting type   Enteritis        Disposition:  ED Disposition     ED Disposition Condition Comment    Discharge Stable            Clarita Medina, DILSHAD  09/08/21 0109

## 2021-10-28 ENCOUNTER — OFFICE VISIT (OUTPATIENT)
Dept: FAMILY MEDICINE CLINIC | Facility: CLINIC | Age: 26
End: 2021-10-28

## 2021-10-28 VITALS
HEART RATE: 84 BPM | HEIGHT: 64 IN | SYSTOLIC BLOOD PRESSURE: 113 MMHG | BODY MASS INDEX: 34.25 KG/M2 | DIASTOLIC BLOOD PRESSURE: 74 MMHG | WEIGHT: 200.6 LBS | OXYGEN SATURATION: 96 %

## 2021-10-28 DIAGNOSIS — F32.A ANXIETY AND DEPRESSION: Primary | ICD-10-CM

## 2021-10-28 DIAGNOSIS — G47.00 INSOMNIA, UNSPECIFIED TYPE: ICD-10-CM

## 2021-10-28 DIAGNOSIS — F43.10 PTSD (POST-TRAUMATIC STRESS DISORDER): ICD-10-CM

## 2021-10-28 DIAGNOSIS — B00.1 RECURRENT COLD SORES: ICD-10-CM

## 2021-10-28 DIAGNOSIS — F41.9 ANXIETY AND DEPRESSION: Primary | ICD-10-CM

## 2021-10-28 PROBLEM — J45.909 ASTHMA: Status: ACTIVE | Noted: 2021-10-28

## 2021-10-28 PROBLEM — F31.9 BIPOLAR DISORDER: Status: ACTIVE | Noted: 2021-10-28

## 2021-10-28 PROCEDURE — 99213 OFFICE O/P EST LOW 20 MIN: CPT | Performed by: NURSE PRACTITIONER

## 2021-10-28 RX ORDER — MIRTAZAPINE 7.5 MG/1
7.5 TABLET, FILM COATED ORAL NIGHTLY
Qty: 90 TABLET | Refills: 1 | Status: SHIPPED | OUTPATIENT
Start: 2021-10-28 | End: 2022-04-28 | Stop reason: SDUPTHER

## 2021-10-28 RX ORDER — MIRTAZAPINE 7.5 MG/1
TABLET, FILM COATED ORAL
COMMUNITY
Start: 2021-08-21 | End: 2021-10-28 | Stop reason: SDUPTHER

## 2021-10-28 RX ORDER — VALACYCLOVIR HYDROCHLORIDE 1 G/1
TABLET, FILM COATED ORAL
Qty: 30 TABLET | Refills: 0 | OUTPATIENT
Start: 2021-10-28 | End: 2022-05-19

## 2021-10-28 RX ORDER — NORGESTIMATE AND ETHINYL ESTRADIOL 7DAYSX3 28
1 KIT ORAL DAILY
COMMUNITY
Start: 2021-08-21 | End: 2022-04-28

## 2021-10-28 RX ORDER — ESCITALOPRAM OXALATE 10 MG/1
10 TABLET ORAL DAILY
Qty: 90 TABLET | Refills: 1 | Status: SHIPPED | OUTPATIENT
Start: 2021-10-28 | End: 2022-03-23 | Stop reason: SDUPTHER

## 2021-10-28 RX ORDER — VALACYCLOVIR HYDROCHLORIDE 1 G/1
1000 TABLET, FILM COATED ORAL
COMMUNITY
Start: 2021-06-24 | End: 2021-10-28 | Stop reason: SDUPTHER

## 2021-10-28 NOTE — PROGRESS NOTES
"Chief Complaint  Depression    Subjective          Shruti Pearl presents to [facility]  Patient is here for a 6 month follow up, she needs refills on her medications. She has no new concerns to address today.    depression-reports mood is good on lexapro and buspar.    Insomnia/PTSD-she is taking 7.5mg -states this dose for better for her the 15mg dose was causing her to feel drowsy the next day.     Cold sores-reports she is having an outbreak q 2-3 months.     She  has a past medical history of Allergies, Anxiety, Asthma, Bipolar disorder (HCC), Depression, and Kidney stone.     Objective   Vital Signs:   /74   Pulse 84   Ht 162.6 cm (64\")   Wt 91 kg (200 lb 9.6 oz)   SpO2 96%   BMI 34.43 kg/m²     Physical Exam  Constitutional:       Appearance: Normal appearance.   Neck:      Thyroid: No thyroid mass, thyromegaly or thyroid tenderness.   Cardiovascular:      Rate and Rhythm: Normal rate and regular rhythm.      Pulses: Normal pulses.      Heart sounds: Normal heart sounds.   Pulmonary:      Effort: Pulmonary effort is normal.      Breath sounds: Normal breath sounds.   Musculoskeletal:      Right lower leg: No edema.      Left lower leg: No edema.   Skin:     General: Skin is warm and dry.   Neurological:      General: No focal deficit present.      Mental Status: She is alert and oriented to person, place, and time.   Psychiatric:         Mood and Affect: Mood normal.         Behavior: Behavior normal.        Result Review :             Current Outpatient Medications:   •  busPIRone (BUSPAR) 10 MG tablet, Take 10 mg by mouth 3 (Three) Times a Day., Disp: , Rfl:   •  escitalopram (Lexapro) 10 MG tablet, Take 1 tablet by mouth Daily., Disp: 90 tablet, Rfl: 1  •  mirtazapine (REMERON) 7.5 MG tablet, Take 1 tablet by mouth Every Night., Disp: 90 tablet, Rfl: 1  •  Tri-Sprintec 0.18/0.215/0.25 MG-35 MCG per tablet, Take 1 tablet by mouth Daily., Disp: , Rfl:   •  " valACYclovir (VALTREX) 1000 MG tablet, Take 2 tabs (2,000mg) with a secondary dose of 2 tabs (2,000mg) in 12 hours x 1 day prn cold sores, Disp: 30 tablet, Rfl: 0   Assessment and Plan    Diagnoses and all orders for this visit:    1. Anxiety and depression (Primary)  Comments:   depression-reports mood is good on lexapro and buspar.    Orders:  -     escitalopram (Lexapro) 10 MG tablet; Take 1 tablet by mouth Daily.  Dispense: 90 tablet; Refill: 1    2. Insomnia, unspecified type  Comments:  Insomnia/PTSD-she is taking 7.5mg Remeron -states this dose for better for her the 15mg dose was causing her to feel drowsy the next day.   Orders:  -     mirtazapine (REMERON) 7.5 MG tablet; Take 1 tablet by mouth Every Night.  Dispense: 90 tablet; Refill: 1    3. PTSD (post-traumatic stress disorder)  -     mirtazapine (REMERON) 7.5 MG tablet; Take 1 tablet by mouth Every Night.  Dispense: 90 tablet; Refill: 1    4. Recurrent cold sores  Comments:  Cold sores-reports she is having an outbreak q 2-3 months. Refilled Valtrex  Orders:  -     valACYclovir (VALTREX) 1000 MG tablet; Take 2 tabs (2,000mg) with a secondary dose of 2 tabs (2,000mg) in 12 hours x 1 day prn cold sores  Dispense: 30 tablet; Refill: 0        Follow Up   Return in about 6 months (around 4/28/2022).  Patient was given instructions and counseling regarding her condition or for health maintenance advice. Please see specific information pulled into the AVS if appropriate.     Shruti ALEC Pearl  reports that she has quit smoking. She has a 8.00 pack-year smoking history. She has never used smokeless tobacco..             Nicole Gallagher, APRN

## 2022-03-03 PROCEDURE — 87086 URINE CULTURE/COLONY COUNT: CPT | Performed by: EMERGENCY MEDICINE

## 2022-03-23 ENCOUNTER — TELEPHONE (OUTPATIENT)
Dept: FAMILY MEDICINE CLINIC | Facility: CLINIC | Age: 27
End: 2022-03-23

## 2022-03-23 DIAGNOSIS — F32.A ANXIETY AND DEPRESSION: ICD-10-CM

## 2022-03-23 DIAGNOSIS — F41.9 ANXIETY AND DEPRESSION: ICD-10-CM

## 2022-03-23 RX ORDER — ESCITALOPRAM OXALATE 10 MG/1
10 TABLET ORAL DAILY
Qty: 30 TABLET | Refills: 0 | Status: SHIPPED | OUTPATIENT
Start: 2022-03-23 | End: 2022-04-28 | Stop reason: SDUPTHER

## 2022-03-23 NOTE — TELEPHONE ENCOUNTER
Caller: Shruti Pearl    Relationship: Self    Best call back number: [phone]    What is the best time to reach you: ANYTIME    Who are you requesting to speak with (clinical staff, provider,  specific staff member): NURSE     What was the call regarding: PATIENT IS REQUESTED A REFILL OF LEXAPRO DUE TO SHE WENT OUT OF TOWN AND LEFT THE MEDICATION BEHIND.      Do you require a callback: YES

## 2022-04-28 ENCOUNTER — OFFICE VISIT (OUTPATIENT)
Dept: FAMILY MEDICINE CLINIC | Facility: CLINIC | Age: 27
End: 2022-04-28

## 2022-04-28 VITALS
OXYGEN SATURATION: 99 % | SYSTOLIC BLOOD PRESSURE: 108 MMHG | HEIGHT: 64 IN | DIASTOLIC BLOOD PRESSURE: 69 MMHG | WEIGHT: 200 LBS | BODY MASS INDEX: 34.15 KG/M2 | HEART RATE: 72 BPM

## 2022-04-28 DIAGNOSIS — G47.00 INSOMNIA, UNSPECIFIED TYPE: ICD-10-CM

## 2022-04-28 DIAGNOSIS — F41.9 ANXIETY AND DEPRESSION: ICD-10-CM

## 2022-04-28 DIAGNOSIS — T14.8XXA MUSCLE STRAIN: Primary | ICD-10-CM

## 2022-04-28 DIAGNOSIS — F43.10 PTSD (POST-TRAUMATIC STRESS DISORDER): ICD-10-CM

## 2022-04-28 DIAGNOSIS — F32.A ANXIETY AND DEPRESSION: ICD-10-CM

## 2022-04-28 DIAGNOSIS — F32.A DEPRESSION, UNSPECIFIED DEPRESSION TYPE: ICD-10-CM

## 2022-04-28 PROCEDURE — 99214 OFFICE O/P EST MOD 30 MIN: CPT | Performed by: NURSE PRACTITIONER

## 2022-04-28 RX ORDER — ESCITALOPRAM OXALATE 10 MG/1
10 TABLET ORAL DAILY
Qty: 30 TABLET | Refills: 0 | OUTPATIENT
Start: 2022-04-28 | End: 2022-05-19

## 2022-04-28 RX ORDER — MIRTAZAPINE 7.5 MG/1
7.5 TABLET, FILM COATED ORAL NIGHTLY
Qty: 90 TABLET | Refills: 1 | OUTPATIENT
Start: 2022-04-28 | End: 2022-05-19

## 2022-04-28 RX ORDER — METHYLPREDNISOLONE 4 MG/1
TABLET ORAL
Qty: 21 EACH | Refills: 0 | OUTPATIENT
Start: 2022-04-28 | End: 2022-05-19

## 2022-04-28 RX ORDER — CYCLOBENZAPRINE HCL 5 MG
5 TABLET ORAL 3 TIMES DAILY PRN
Qty: 30 TABLET | Refills: 0 | OUTPATIENT
Start: 2022-04-28 | End: 2022-05-19

## 2022-04-28 RX ORDER — BUSPIRONE HYDROCHLORIDE 10 MG/1
10 TABLET ORAL 3 TIMES DAILY
Qty: 90 TABLET | Refills: 1 | OUTPATIENT
Start: 2022-04-28 | End: 2022-05-19

## 2022-04-28 NOTE — PROGRESS NOTES
"Chief Complaint  Anxiety and Depression (Buspar, Lexapro, Remeron)    Subjective          Shruti Pearl presents to [facility]  History of Present Illness  The patient presents today for follow-up of anxiety, and depression.    Depression - She reports that she has stopped taking the Lexapro. She has been taking Buspar, and Remeron. She was out of town, and had just refilled her prescription, left her Lexapro where she had been out of town, and has not been able to refill the Lexapro because she does not think her insurance will pay for it. She states that she has been off of the Lexapro for 1 month. She adds that the first few weeks she felt \"miserable,\" serna, and short-tempered off of the medication.     Anxiety - She feels that the Buspar is controlling her anxiety well. She only takes as needed, which is only a few times per week.     Insomnia - She uses Remeron more for sleep with good benefit.    Birth control - She discontinued the use of her birth control, Tri-Sprintec because she has not been sexually active.     Cold sores - She takes Valtrex for cold sores, and does not need a refill.    Muscle strain - The patient thinks she pulled something in her back after helping a friend move, and has needed to be off work for 2 days due to back pain. She denies numbness, and tingling down her legs. She has taken muscle relaxers in the past.    Health maintenance - Last labs were in 09/2021. Last Pap test was in 04/2021, which was normal.    Employment - She works in a warehouse.        Patient Care Team:  Nicole Gallagher APRN as PCP - General (Nurse Practitioner)   She  has a past medical history of Allergies, Anxiety, Asthma, Bipolar disorder (HCC), Depression, and Kidney stone.     Objective   Vital Signs:   /69   Pulse 72   Ht 162.6 cm (64\")   Wt 90.7 kg (200 lb)   SpO2 99%   BMI 34.33 kg/m²     Physical Exam  Vitals reviewed.   Constitutional:       " Appearance: Normal appearance. She is well-developed.   Neck:      Thyroid: No thyromegaly.      Vascular: No carotid bruit.   Cardiovascular:      Rate and Rhythm: Normal rate and regular rhythm.      Heart sounds: No murmur heard.    No friction rub. No gallop.   Pulmonary:      Effort: Pulmonary effort is normal.      Breath sounds: Normal breath sounds. No wheezing or rhonchi.   Musculoskeletal:      Right lower leg: No edema.      Left lower leg: No edema.   Neurological:      Mental Status: She is alert and oriented to person, place, and time.      Cranial Nerves: No cranial nerve deficit.   Psychiatric:         Mood and Affect: Mood and affect normal.         Behavior: Behavior normal.         Thought Content: Thought content normal.         Judgment: Judgment normal.        Result Review :   The following data was reviewed by: DILSHAD Madden on 2022:  CMP    CMP 21   Glucose 88   BUN 10   Creatinine 0.65   eGFR Non African Am 111   Sodium 138   Potassium 3.5   Chloride 106   Calcium 9.0   Albumin 3.90   Total Bilirubin 1.6 (A)   Alkaline Phosphatase 50   AST (SGOT) 18   ALT (SGPT) 19   (A) Abnormal value            CBC    CBC 21   WBC 9.06   RBC 4.91   Hemoglobin 14.7   Hematocrit 41.5   MCV 84.5   MCH 29.9   MCHC 35.4   RDW 12.1 (A)   Platelets 180   (A) Abnormal value            CBC w/diff    CBC w/Diff 21   WBC 9.06   RBC 4.91   Hemoglobin 14.7   Hematocrit 41.5   MCV 84.5   MCH 29.9   MCHC 35.4   RDW 12.1 (A)   Platelets 180   Neutrophil Rel % 76.5 (A)   Immature Granulocyte Rel % 0.3   Lymphocyte Rel % 16.3 (A)   Monocyte Rel % 5.6   Eosinophil Rel % 1.2   Basophil Rel % 0.1   (A) Abnormal value                             Past Surgical History:   Procedure Laterality Date   •  SECTION        Family History   Problem Relation Age of Onset   • Diabetes Maternal Grandfather    • Diabetes Other         UNCLE        Current Outpatient Medications:   •  busPIRone  (BUSPAR) 10 MG tablet, Take 1 tablet by mouth 3 (Three) Times a Day., Disp: 90 tablet, Rfl: 1  •  escitalopram (Lexapro) 10 MG tablet, Take 1 tablet by mouth Daily., Disp: 30 tablet, Rfl: 0  •  mirtazapine (REMERON) 7.5 MG tablet, Take 1 tablet by mouth Every Night., Disp: 90 tablet, Rfl: 1  •  valACYclovir (VALTREX) 1000 MG tablet, Take 2 tabs (2,000mg) with a secondary dose of 2 tabs (2,000mg) in 12 hours x 1 day prn cold sores, Disp: 30 tablet, Rfl: 0  •  cyclobenzaprine (FLEXERIL) 5 MG tablet, Take 1 tablet by mouth 3 (Three) Times a Day As Needed for Muscle Spasms., Disp: 30 tablet, Rfl: 0  •  methylPREDNISolone (MEDROL) 4 MG dose pack, Take as directed on package instructions., Disp: 21 each, Rfl: 0   Assessment and Plan    Diagnoses and all orders for this visit:    1. Muscle strain (Primary)  Comments:  Medrol dose pack for back pain, and Flexeril muscle relaxer 5 mg as needed up to 3 times per day. A doctor's note was given to her for missing work on 04/27/202  Orders:  -     cyclobenzaprine (FLEXERIL) 5 MG tablet; Take 1 tablet by mouth 3 (Three) Times a Day As Needed for Muscle Spasms.  Dispense: 30 tablet; Refill: 0  -     methylPREDNISolone (MEDROL) 4 MG dose pack; Take as directed on package instructions.  Dispense: 21 each; Refill: 0    2. Anxiety and depression  Comments:   depression-reports mood is good on lexapro and buspar.  Restart Lexapro 10 mg daily. Continue Buspar as needed.    Orders:  -     busPIRone (BUSPAR) 10 MG tablet; Take 1 tablet by mouth 3 (Three) Times a Day.  Dispense: 90 tablet; Refill: 1    3. Insomnia, unspecified type  Comments:  Insomnia/PTSD-she is taking 7.5mg Remeron -states this dose for better for her the 15mg dose was causing her to feel drowsy the next day. Continue Remeron 7.5mg  Orders:  -     mirtazapine (REMERON) 7.5 MG tablet; Take 1 tablet by mouth Every Night.  Dispense: 90 tablet; Refill: 1    4. PTSD (post-traumatic stress disorder)  Comments:  Continue  Remeron 7.5 mg.    Orders:  -     mirtazapine (REMERON) 7.5 MG tablet; Take 1 tablet by mouth Every Night.  Dispense: 90 tablet; Refill: 1    5. Depression, unspecified depression type  -     escitalopram (Lexapro) 10 MG tablet; Take 1 tablet by mouth Daily.  Dispense: 30 tablet; Refill: 0        Follow Up   Return in about 6 months (around 10/28/2022).  Patient was given instructions and counseling regarding her condition or for health maintenance advice. Please see specific information pulled into the AVS if appropriate.     Shruti Pearl  reports that she has quit smoking. She has a 8.00 pack-year smoking history. She has never used smokeless tobacco..         DILSHAD Madden    The patient is advised to continue current medications.    Transcribed from ambient dictation for DILSHAD Madden by Krystal Sunshine.  04/28/22   10:01 EDT    Patient verbalized consent to the visit recording.

## 2022-06-10 DIAGNOSIS — G47.00 INSOMNIA, UNSPECIFIED TYPE: ICD-10-CM

## 2022-06-10 DIAGNOSIS — F43.10 PTSD (POST-TRAUMATIC STRESS DISORDER): ICD-10-CM

## 2022-06-10 NOTE — TELEPHONE ENCOUNTER
Pt seen in office 04/28 reported she done well on Remeron 7.5 #90 1RF sent    Pt was seen at  on 05/19/22 that provider d/c'd all meds,     LMTCB @ 808

## 2022-06-13 RX ORDER — MIRTAZAPINE 7.5 MG/1
7.5 TABLET, FILM COATED ORAL NIGHTLY
Qty: 90 TABLET | Refills: 1 | Status: SHIPPED | OUTPATIENT
Start: 2022-06-13 | End: 2022-08-09

## 2022-06-16 ENCOUNTER — TELEPHONE (OUTPATIENT)
Dept: FAMILY MEDICINE CLINIC | Facility: CLINIC | Age: 27
End: 2022-06-16

## 2022-06-16 NOTE — TELEPHONE ENCOUNTER
PATIENT STATED THAT CANDIDA WILL BE SENDING OVER PAPER WORK THROUGH FAX  FOR PCP TO FILL OUT FOR THE DAYS THAT SHE WAS GIVEN OFF AT THE END OF April. PATIENT STATED SHE WAS GIVEN A DOCTORS NOTE BUT THAT HER EMPLOYER NEEDS A FORM INSTEAD.

## 2022-07-21 ENCOUNTER — TELEPHONE (OUTPATIENT)
Dept: FAMILY MEDICINE CLINIC | Facility: CLINIC | Age: 27
End: 2022-07-21

## 2022-08-09 ENCOUNTER — OFFICE VISIT (OUTPATIENT)
Dept: FAMILY MEDICINE CLINIC | Facility: CLINIC | Age: 27
End: 2022-08-09

## 2022-08-09 DIAGNOSIS — Z00.00 ANNUAL PHYSICAL EXAM: Primary | ICD-10-CM

## 2022-08-09 DIAGNOSIS — S39.012S BACK STRAIN, SEQUELA: ICD-10-CM

## 2022-08-09 DIAGNOSIS — Z23 NEED FOR TDAP VACCINATION: ICD-10-CM

## 2022-08-09 PROCEDURE — 90471 IMMUNIZATION ADMIN: CPT | Performed by: NURSE PRACTITIONER

## 2022-08-09 PROCEDURE — 99212 OFFICE O/P EST SF 10 MIN: CPT | Performed by: NURSE PRACTITIONER

## 2022-08-09 PROCEDURE — 90715 TDAP VACCINE 7 YRS/> IM: CPT | Performed by: NURSE PRACTITIONER

## 2022-08-09 NOTE — PROGRESS NOTES
Chief Complaint  Back Pain    SUBJECTIVE  Shruti Pearl presents to [facility]     The patient presents today for [facility] paperwork to be filled out for her back injury that occurred in April.       History of Present Illness   The patient presents today for [facility] paperwork to be filled out for her back injury that occurred in April.     Medications - She has not taken any medications since 2022. She states that she feels fine without the Remeron. She is not currently taking Levaquin or prednisone.     FMLA paperwork - She states that she injured her back and was off from work from 2022 through 2022 due to back strain and muscle spasms. She denies numbness or tingling in her legs. She has taken a muscle relaxer in the past, which resolved her symptoms.    Anxiety and depression - She states that her anxiety and depression are under good control. She adds that her anxiety has not been bad at all. Her depression is under control and she uses art and hanging out with her child to cope with her depression. She states that her sleep has been good.    Blood pressure is in good range today at 103/69 mmHg.    Preventatives - She has received both COVID-19 vaccines, but has not had a booster. She is due for a tetanus vaccine. She states that she had the Gardasil vaccines when she was a child. Her last Pap smear was normal and the next one is due in . Her last labs were performed in 2021.       Past Medical History:   Diagnosis Date   • Allergies    • Anxiety    • Asthma    • Bipolar disorder (HCC)    • Depression    • Kidney stone       Family History   Problem Relation Age of Onset   • Diabetes Maternal Grandfather    • Diabetes Other         UNCLE      Past Surgical History:   Procedure Laterality Date   •  SECTION        No current outpatient medications on file.    OBJECTIVE  Vital Signs:   LMP  (LMP Unknown)    Estimated body mass index is 34.32 kg/m² as  "calculated from the following:    Height as of 4/28/22: 162.6 cm (64\").    Weight as of 5/19/22: 90.7 kg (199 lb 15.3 oz).     Wt Readings from Last 3 Encounters:   05/19/22 90.7 kg (199 lb 15.3 oz)   04/28/22 90.7 kg (200 lb)   03/03/22 93.2 kg (205 lb 6.4 oz)     BP Readings from Last 3 Encounters:   05/19/22 109/79   04/28/22 108/69   03/03/22 113/70       Physical Exam  Vitals reviewed.   Constitutional:       Appearance: Normal appearance. She is well-developed.   Neck:      Thyroid: No thyromegaly.      Vascular: No carotid bruit.   Cardiovascular:      Rate and Rhythm: Normal rate and regular rhythm.      Heart sounds: No murmur heard.    No friction rub. No gallop.   Pulmonary:      Effort: Pulmonary effort is normal.      Breath sounds: Normal breath sounds. No wheezing or rhonchi.   Musculoskeletal:      Right lower leg: No edema.      Left lower leg: No edema.   Neurological:      Mental Status: She is alert and oriented to person, place, and time.      Cranial Nerves: No cranial nerve deficit.   Psychiatric:         Mood and Affect: Mood and affect normal.         Behavior: Behavior normal.         Thought Content: Thought content normal.         Judgment: Judgment normal.          Result Review        No Images in the past 120 days found..     The above data has been reviewed by DILSHAD Madden 08/09/2022 08:07 EDT.          Patient Care Team:  Nicole Gallagher APRN as PCP - General (Nurse Practitioner)      ASSESSMENT & PLAN    Diagnoses and all orders for this visit:    1. Annual physical exam (Primary)  Comments:  - She will have fasting labs performed in 09/2022.    Orders:  -     CBC & Differential; Future  -     Vitamin B12; Future  -     Lipid Panel; Future  -     TSH; Future  -     Vitamin D 25 Hydroxy; Future  -     Comprehensive Metabolic Panel; Future  -     Urinalysis With Culture If Indicated -; Future    2. Need for Tdap vaccination  Comments:  - She will received a Tdap " vaccine.    Orders:  -     Tdap Vaccine Greater Than or Equal To 8yo IM    3. Back strain, sequela  Comments:  - Paperwork for FMLA was filled out today for back strain in April-her symptoms have resolved.            Tobacco Use: Medium Risk   • Smoking Tobacco Use: Former Smoker   • Smokeless Tobacco Use: Never Used       Follow Up     Return in about 1 year (around 8/9/2023).      Patient was given instructions and counseling regarding her condition or for health maintenance advice. Please see specific information pulled into the AVS if appropriate.   I have reviewed information obtained and documented by others and I have confirmed the accuracy of this documented note.    DILSHAD Madden      Transcribed from ambient dictation for DILSHAD Madden by Krystal Sunshine.  08/09/22   08:53 EDT    Patient verbalized consent to the visit recording.

## 2022-10-28 ENCOUNTER — OFFICE VISIT (OUTPATIENT)
Dept: FAMILY MEDICINE CLINIC | Facility: CLINIC | Age: 27
End: 2022-10-28

## 2022-10-28 VITALS
BODY MASS INDEX: 34.83 KG/M2 | HEIGHT: 64 IN | WEIGHT: 204 LBS | DIASTOLIC BLOOD PRESSURE: 73 MMHG | OXYGEN SATURATION: 98 % | HEART RATE: 78 BPM | SYSTOLIC BLOOD PRESSURE: 102 MMHG

## 2022-10-28 DIAGNOSIS — R41.840 ATTENTION DEFICIT: ICD-10-CM

## 2022-10-28 DIAGNOSIS — R10.31 RLQ ABDOMINAL PAIN: ICD-10-CM

## 2022-10-28 DIAGNOSIS — Z32.02 ENCOUNTER FOR PREGNANCY TEST, RESULT NEGATIVE: ICD-10-CM

## 2022-10-28 DIAGNOSIS — Z12.4 SCREENING FOR CERVICAL CANCER: Primary | ICD-10-CM

## 2022-10-28 DIAGNOSIS — B37.9 CANDIDA INFECTION: ICD-10-CM

## 2022-10-28 LAB
B-HCG UR QL: NEGATIVE
BILIRUB BLD-MCNC: NEGATIVE MG/DL
CLARITY, POC: CLEAR
COLOR UR: YELLOW
EXPIRATION DATE: NORMAL
EXPIRATION DATE: NORMAL
GLUCOSE UR STRIP-MCNC: NEGATIVE MG/DL
INTERNAL NEGATIVE CONTROL: NORMAL
INTERNAL POSITIVE CONTROL: NORMAL
KETONES UR QL: NEGATIVE
LEUKOCYTE EST, POC: NEGATIVE
Lab: NORMAL
Lab: NORMAL
NITRITE UR-MCNC: NEGATIVE MG/ML
PH UR: 7.5 [PH] (ref 5–8)
PROT UR STRIP-MCNC: NEGATIVE MG/DL
RBC # UR STRIP: NEGATIVE /UL
SP GR UR: 1.02 (ref 1–1.03)
UROBILINOGEN UR QL: NORMAL

## 2022-10-28 PROCEDURE — 99395 PREV VISIT EST AGE 18-39: CPT | Performed by: NURSE PRACTITIONER

## 2022-10-28 PROCEDURE — 2014F MENTAL STATUS ASSESS: CPT | Performed by: NURSE PRACTITIONER

## 2022-10-28 PROCEDURE — G0123 SCREEN CERV/VAG THIN LAYER: HCPCS | Performed by: NURSE PRACTITIONER

## 2022-10-28 PROCEDURE — 87624 HPV HI-RISK TYP POOLED RSLT: CPT | Performed by: NURSE PRACTITIONER

## 2022-10-28 PROCEDURE — 81003 URINALYSIS AUTO W/O SCOPE: CPT | Performed by: NURSE PRACTITIONER

## 2022-10-28 PROCEDURE — 3008F BODY MASS INDEX DOCD: CPT | Performed by: NURSE PRACTITIONER

## 2022-10-28 PROCEDURE — 81025 URINE PREGNANCY TEST: CPT | Performed by: NURSE PRACTITIONER

## 2022-10-28 RX ORDER — FLUCONAZOLE 150 MG/1
150 TABLET ORAL ONCE
Qty: 1 TABLET | Refills: 0 | Status: SHIPPED | OUTPATIENT
Start: 2022-10-28 | End: 2022-10-28

## 2022-10-28 NOTE — PROGRESS NOTES
Subjective   History of Present Illness    Shruti Pearl is a 27 y.o. female who presents for annual exam.      The patient presents today for a Pap smear and right lower quadrant pain that waxes and wanes, sharp at times for the last month. Sometimes her pain is doubled over 7 to 9 on a pain scale of 0 to 10. She denies any fever or chills when that happens. The pain will come and go, but sometimes it can last a couple of hours where she will be fine for a few minutes and then the pain just shows up. It has been a year since she had any imaging on her abdomen. At that time, she was having abdominal pain, vomiting, diarrhea, right mid to lower abdominal pain. She did have a right adnexal cyst, small amount of free fluid. It also says new circumferential mural thickening involves distal ileum including terminal ileum and is suggestive of an age indeterminate infectious inflammatory enteritis such as Crohn's. She denies any issues with her bowels, but she does not have enough bowel movements. She denies any family history of Crohn's disease. She does have nausea when the pain comes, but she does not vomit. She does not have pain right this minute. She does not feel like there is anything like constipation going on either. She does do her breast exams once or twice a week. She denies any breast concerns. She denies any gynecologic concerns. She feels like the pain acts up more around her period. She mentions she questioned if she had a yeast infection recently.     ADHD- The patient would like to be tested for ADHD. She has a family history of ADHD.    Obstetric History:  OB History    No obstetric history on file.        Menstrual History:     Patient's last menstrual period was 10/06/2022 (exact date).       Sexual History:         Current contraception: none  History of abnormal Pap smear: no  Received Gardasil immunization: yes - n/a  Perform regular self breast exam: yes - once week or twice a week  Family history  of uterine or ovarian cancer: no  Family History of cervical cancer: no  Family History of colon cancer/colon polyps: no  History of abnormal mammogram: n/a  Family history of breast cancer: no      The following portions of the patient's history were reviewed and updated as appropriate: allergies, current medications, past family history, past medical history, past social history, past surgical history and problem list.    Review of Systems    GYN:  positive for  vaginal discharge     Objective   Physical Exam  Vitals reviewed. Exam conducted with a chaperone present.   Constitutional:       General: She is not in acute distress.     Appearance: Normal appearance. She is well-developed. She is not diaphoretic.   HENT:      Head: Normocephalic and atraumatic. Hair is normal.      Right Ear: Hearing, tympanic membrane, ear canal and external ear normal. No decreased hearing noted. No drainage.      Left Ear: Hearing, tympanic membrane, ear canal and external ear normal. No decreased hearing noted.      Nose: Nose normal. No nasal deformity.      Mouth/Throat:      Mouth: Mucous membranes are moist.   Eyes:      General: Lids are normal.         Right eye: No discharge.         Left eye: No discharge.      Extraocular Movements: Extraocular movements intact.      Conjunctiva/sclera: Conjunctivae normal.      Pupils: Pupils are equal, round, and reactive to light.   Neck:      Thyroid: No thyromegaly.      Vascular: No JVD.   Cardiovascular:      Rate and Rhythm: Normal rate and regular rhythm.      Pulses: Normal pulses.      Heart sounds: Normal heart sounds. No murmur heard.    No friction rub. No gallop.   Pulmonary:      Effort: Pulmonary effort is normal. No respiratory distress.      Breath sounds: Normal breath sounds. No wheezing or rales.   Chest:      Chest wall: No tenderness.   Breasts:     Breasts are symmetrical.      Right: Normal. No mass, nipple discharge, skin change or tenderness.      Left: Normal.  "No mass, nipple discharge, skin change or tenderness.   Abdominal:      General: Bowel sounds are normal. There is no distension.      Palpations: Abdomen is soft. There is no mass.      Tenderness: There is abdominal tenderness in the right lower quadrant. There is no guarding or rebound.      Hernia: No hernia is present.   Genitourinary:     General: Normal vulva.      Urethra: No urethral swelling.      Vagina: Vaginal discharge present. No lesions.      Cervix: Normal.      Uterus: Normal. Not tender.       Adnexa: Right adnexa normal and left adnexa normal.        Right: No tenderness.          Left: No mass.        Rectum: Normal.   Musculoskeletal:         General: No tenderness or deformity. Normal range of motion.      Cervical back: Normal range of motion and neck supple.   Lymphadenopathy:      Cervical: No cervical adenopathy.   Skin:     General: Skin is warm and dry.      Findings: No erythema or rash.   Neurological:      Mental Status: She is alert and oriented to person, place, and time.      Cranial Nerves: No cranial nerve deficit.      Motor: No abnormal muscle tone.      Coordination: Coordination normal.      Deep Tendon Reflexes: Reflexes are normal and symmetric. Reflexes normal.   Psychiatric:         Mood and Affect: Mood normal.         Behavior: Behavior normal.         Thought Content: Thought content normal.         Judgment: Judgment normal.         /73 (BP Location: Left arm, Patient Position: Sitting, Cuff Size: Large Adult)   Pulse 78   Ht 162.6 cm (64\")   Wt 92.5 kg (204 lb)   LMP 10/06/2022 (Exact Date)   SpO2 98%   BMI 35.02 kg/m²     General:   alert, appears stated age and cooperative   Heart: regular rate and rhythm, S1, S2 normal, no murmur, click, rub or gallop   Lungs: clear to auscultation bilaterally   Abdomen: soft, non-tender, without masses or organomegaly   Breast: inspection negative, no nipple discharge or bleeding, no masses or nodularity palpable "   Vulva: normal   Vagina: curdlike discharge   Cervix: no cervical motion tenderness   Uterus: non-tender   Adnexa: no mass, fullness, tenderness        Past Surgical History:   Procedure Laterality Date   •  SECTION        Family History   Problem Relation Age of Onset   • Diabetes Maternal Grandfather    • Diabetes Other         UNCLE      Assessment and Plan    Diagnoses and all orders for this visit:    1. Screening for cervical cancer (Primary)  -     POC Urinalysis Dipstick, Automated  -     IgP, Aptima HPV; Future  -     IgP, Aptima HPV    2. RLQ abdominal pain  -     CT Abdomen Pelvis With Contrast; Future    3. Candida infection  -     fluconazole (Diflucan) 150 MG tablet; Take 1 tablet by mouth 1 (One) Time for 1 dose.  Dispense: 1 tablet; Refill: 0    4. Attention deficit  -     Ambulatory Referral to Psychiatry    5. Encounter for pregnancy test, result negative  -     POC Pregnancy, Urine              Screening for cervical cancer [Z12.4]     All questions answered.  Await pap smear results.  Breast self exam technique reviewed and patient encouraged to perform self-exam monthly.  CT scan of pelvis.             The patient is advised to continue current medications, continue current healthy lifestyle patterns and return for routine annual checkups.         Transcribed from ambient dictation for DILSHAD Madden by Francia Prescott.  10/28/22   16:18 EDT    Patient or patient representative verbalized consent to the visit recording.  I have personally performed the services described in this document as transcribed by the above individual, and it is both accurate and complete.

## 2022-11-05 LAB
CYTOLOGIST CVX/VAG CYTO: ABNORMAL
CYTOLOGY CVX/VAG DOC CYTO: ABNORMAL
CYTOLOGY CVX/VAG DOC THIN PREP: ABNORMAL
DX ICD CODE: ABNORMAL
HIV 1 & 2 AB SER-IMP: ABNORMAL
HPV I/H RISK 4 DNA CVX QL PROBE+SIG AMP: POSITIVE
OTHER STN SPEC: ABNORMAL
STAT OF ADQ CVX/VAG CYTO-IMP: ABNORMAL

## 2022-11-10 DIAGNOSIS — B97.7 HPV (HUMAN PAPILLOMA VIRUS) INFECTION: Primary | ICD-10-CM
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
## 2022-11-22 ENCOUNTER — HOSPITAL ENCOUNTER (OUTPATIENT)
Dept: CT IMAGING | Facility: HOSPITAL | Age: 27
Discharge: HOME | End: 2022-11-22

## 2022-11-22 ENCOUNTER — OFFICE VISIT (OUTPATIENT)
Dept: PSYCHIATRY | Facility: CLINIC | Age: 27
End: 2022-11-22

## 2022-11-22 ENCOUNTER — LAB (OUTPATIENT)
Dept: LAB | Facility: HOSPITAL | Age: 27
End: 2022-11-22

## 2022-11-22 VITALS
DIASTOLIC BLOOD PRESSURE: 83 MMHG | HEIGHT: 64 IN | SYSTOLIC BLOOD PRESSURE: 119 MMHG | WEIGHT: 207 LBS | BODY MASS INDEX: 35.34 KG/M2

## 2022-11-22 DIAGNOSIS — F17.211 NICOTINE DEPENDENCE, CIGARETTES, IN REMISSION: ICD-10-CM

## 2022-11-22 DIAGNOSIS — F12.90 MARIJUANA USE: ICD-10-CM

## 2022-11-22 DIAGNOSIS — F90.2 ADHD (ATTENTION DEFICIT HYPERACTIVITY DISORDER), COMBINED TYPE: ICD-10-CM

## 2022-11-22 DIAGNOSIS — R10.31 RLQ ABDOMINAL PAIN: ICD-10-CM

## 2022-11-22 DIAGNOSIS — F43.10 POST TRAUMATIC STRESS DISORDER (PTSD): ICD-10-CM

## 2022-11-22 DIAGNOSIS — Z86.59 HISTORY OF ANXIETY DISORDER: ICD-10-CM

## 2022-11-22 DIAGNOSIS — F31.81 BIPOLAR II DISORDER: Primary | ICD-10-CM

## 2022-11-22 DIAGNOSIS — Z00.00 ANNUAL PHYSICAL EXAM: ICD-10-CM

## 2022-11-22 PROBLEM — F32.A DEPRESSION: Status: RESOLVED | Noted: 2021-10-28 | Resolved: 2022-11-22

## 2022-11-22 LAB
25(OH)D3 SERPL-MCNC: 14.8 NG/ML (ref 30–100)
ALBUMIN SERPL-MCNC: 4.1 G/DL (ref 3.5–5.2)
ALBUMIN/GLOB SERPL: 1.4 G/DL
ALP SERPL-CCNC: 62 U/L (ref 39–117)
ALT SERPL W P-5'-P-CCNC: 27 U/L (ref 1–33)
ANION GAP SERPL CALCULATED.3IONS-SCNC: 8 MMOL/L (ref 5–15)
AST SERPL-CCNC: 22 U/L (ref 1–32)
BASOPHILS # BLD AUTO: 0.04 10*3/MM3 (ref 0–0.2)
BASOPHILS NFR BLD AUTO: 0.7 % (ref 0–1.5)
BILIRUB SERPL-MCNC: 1.4 MG/DL (ref 0–1.2)
BUN SERPL-MCNC: 12 MG/DL (ref 6–20)
BUN/CREAT SERPL: 16.2 (ref 7–25)
CALCIUM SPEC-SCNC: 9.2 MG/DL (ref 8.6–10.5)
CHLORIDE SERPL-SCNC: 108 MMOL/L (ref 98–107)
CHOLEST SERPL-MCNC: 165 MG/DL (ref 0–200)
CO2 SERPL-SCNC: 24 MMOL/L (ref 22–29)
CREAT SERPL-MCNC: 0.74 MG/DL (ref 0.57–1)
DEPRECATED RDW RBC AUTO: 39.4 FL (ref 37–54)
EGFRCR SERPLBLD CKD-EPI 2021: 114.6 ML/MIN/1.73
EOSINOPHIL # BLD AUTO: 0.19 10*3/MM3 (ref 0–0.4)
EOSINOPHIL NFR BLD AUTO: 3.3 % (ref 0.3–6.2)
ERYTHROCYTE [DISTWIDTH] IN BLOOD BY AUTOMATED COUNT: 12.1 % (ref 12.3–15.4)
GLOBULIN UR ELPH-MCNC: 3 GM/DL
GLUCOSE SERPL-MCNC: 90 MG/DL (ref 65–99)
HCT VFR BLD AUTO: 45.6 % (ref 34–46.6)
HDLC SERPL-MCNC: 56 MG/DL (ref 40–60)
HGB BLD-MCNC: 15.4 G/DL (ref 12–15.9)
IMM GRANULOCYTES # BLD AUTO: 0.01 10*3/MM3 (ref 0–0.05)
IMM GRANULOCYTES NFR BLD AUTO: 0.2 % (ref 0–0.5)
LDLC SERPL CALC-MCNC: 89 MG/DL (ref 0–100)
LDLC/HDLC SERPL: 1.55 {RATIO}
LYMPHOCYTES # BLD AUTO: 1.6 10*3/MM3 (ref 0.7–3.1)
LYMPHOCYTES NFR BLD AUTO: 27.9 % (ref 19.6–45.3)
MCH RBC QN AUTO: 29.9 PG (ref 26.6–33)
MCHC RBC AUTO-ENTMCNC: 33.8 G/DL (ref 31.5–35.7)
MCV RBC AUTO: 88.5 FL (ref 79–97)
MONOCYTES # BLD AUTO: 0.43 10*3/MM3 (ref 0.1–0.9)
MONOCYTES NFR BLD AUTO: 7.5 % (ref 5–12)
NEUTROPHILS NFR BLD AUTO: 3.46 10*3/MM3 (ref 1.7–7)
NEUTROPHILS NFR BLD AUTO: 60.4 % (ref 42.7–76)
NRBC BLD AUTO-RTO: 0 /100 WBC (ref 0–0.2)
PLATELET # BLD AUTO: 250 10*3/MM3 (ref 140–450)
PMV BLD AUTO: 10.5 FL (ref 6–12)
POTASSIUM SERPL-SCNC: 4.2 MMOL/L (ref 3.5–5.2)
PROT SERPL-MCNC: 7.1 G/DL (ref 6–8.5)
RBC # BLD AUTO: 5.15 10*6/MM3 (ref 3.77–5.28)
SODIUM SERPL-SCNC: 140 MMOL/L (ref 136–145)
TRIGL SERPL-MCNC: 111 MG/DL (ref 0–150)
TSH SERPL DL<=0.05 MIU/L-ACNC: 1.84 UIU/ML (ref 0.27–4.2)
VIT B12 BLD-MCNC: 675 PG/ML (ref 211–946)
VLDLC SERPL-MCNC: 20 MG/DL (ref 5–40)
WBC NRBC COR # BLD: 5.73 10*3/MM3 (ref 3.4–10.8)

## 2022-11-22 PROCEDURE — 85025 COMPLETE CBC W/AUTO DIFF WBC: CPT

## 2022-11-22 PROCEDURE — 74177 CT ABD & PELVIS W/CONTRAST: CPT

## 2022-11-22 PROCEDURE — 90792 PSYCH DIAG EVAL W/MED SRVCS: CPT | Performed by: PSYCHIATRY & NEUROLOGY

## 2022-11-22 PROCEDURE — 80061 LIPID PANEL: CPT

## 2022-11-22 PROCEDURE — 80053 COMPREHEN METABOLIC PANEL: CPT

## 2022-11-22 PROCEDURE — 84443 ASSAY THYROID STIM HORMONE: CPT

## 2022-11-22 PROCEDURE — 82607 VITAMIN B-12: CPT

## 2022-11-22 PROCEDURE — 82306 VITAMIN D 25 HYDROXY: CPT

## 2022-11-22 PROCEDURE — 36415 COLL VENOUS BLD VENIPUNCTURE: CPT

## 2022-11-22 PROCEDURE — 0 IOPAMIDOL PER 1 ML: Performed by: NURSE PRACTITIONER

## 2022-11-22 RX ORDER — LAMOTRIGINE 25 MG/1
TABLET ORAL
Qty: 45 TABLET | Refills: 0 | Status: SHIPPED | OUTPATIENT
Start: 2022-11-22 | End: 2022-12-22 | Stop reason: SDUPTHER

## 2022-11-22 RX ADMIN — IOPAMIDOL 100 ML: 755 INJECTION, SOLUTION INTRAVENOUS at 14:44

## 2022-11-22 NOTE — TREATMENT PLAN
Multi-Disciplinary Problems (from Behavioral Health Treatment Plan)    Active Problems     Problem: Post Traumatic Stress  Start Date: 11/22/22    Problem Details: The patient self-scales this problem as a 2 with 10 being the worst.        Goal Priority Start Date Expected End Date End Date    Patient will process and move through trauma in a way that improves self regard and the patients ability to function optimally in the world around them. -- 11/22/22 -- --    Goal Details: Progress toward goal:  Not appropriate to rate progress toward goal since this is the initial treatment plan.        Goal Intervention Frequency Start Date End Date    Assist patient in identifying ways that trauma has negatively impacted their view of themselves and the world. PRN 11/22/22 --    Intervention Details: Duration of treatment until until remission of symptoms.        Goal Intervention Frequency Start Date End Date    Process trauma in the context of the safe session environment. PRN 11/22/22 --    Intervention Details: Duration of treatment until until remission of symptoms.        Goal Intervention Frequency Start Date End Date    Develop a plan of behavior changes that will reduce the stress of the trauma. PRN 11/22/22 --    Intervention Details: Duration of treatment until until remission of symptoms.              Problem: Mood Disorder  Start Date: 11/22/22    Problem Details: The patient self-scales this problem as a 2 with 10 being the worst.      Goal Priority Start Date Expected End Date End Date    Decrease impulsivity in action- that is, not engaging in self-destructive behavious such as overspending, promiscuity, substance abuse, or use of profane language. -- 11/22/22 -- --    Goal Details: Progress toward goal:  Not appropriate to rate progress toward goal since this is the initial treatment plan.      Goal Intervention Frequency Start Date End Date    Provide structure and focus to patients thoughts and action by  establishing plans and routine. PRN 11/22/22 --    Intervention Details: Duration of treatment until until remission of symptoms.      Goal Intervention Frequency Start Date End Date    Assist patient in setting reasonable goals and limits on behavior. PRN 11/22/22 --    Intervention Details: Duration of treatment until until remission of symptoms.            Problem: ADHD (Adult)  Start Date: 11/22/22    Problem Details: The patient self-scales this problem as a 8 with 10 being the worst.      Goal Priority Start Date Expected End Date End Date    Patient will sustain attention and concentration to complete chores, and work responsibilites and increase positive interaction in all relationships. -- 11/22/22 -- --    Goal Details: Progress toward goal:  Not appropriate to rate progress toward goal since this is the initial treatment plan.      Goal Intervention Frequency Start Date End Date    Assist patient in setting responsible goals and breaking down large tasks. PRN 11/22/22 --    Intervention Details: Duration of treatment until until remission of symptoms.      Goal Intervention Frequency Start Date End Date    Assist patient in using self monitoring checklist to improve attention, work performance, and social skills. PRN 11/22/22 --    Intervention Details: Duration of treatment until until remission of symptoms.                         I have discussed and reviewed this treatment plan with the patient.

## 2022-11-28 DIAGNOSIS — E55.9 VITAMIN D DEFICIENCY: Primary | ICD-10-CM

## 2022-11-28 RX ORDER — ERGOCALCIFEROL 1.25 MG/1
50000 CAPSULE ORAL WEEKLY
Qty: 13 CAPSULE | Refills: 1 | Status: SHIPPED | OUTPATIENT
Start: 2022-11-28

## 2022-11-30 ENCOUNTER — PATIENT ROUNDING (BHMG ONLY) (OUTPATIENT)
Dept: PSYCHIATRY | Facility: CLINIC | Age: 27
End: 2022-11-30

## 2022-11-30 NOTE — PROGRESS NOTES
November 30, 2022     VOICEMAIL    Hello, may I speak with Shruti Pearl?    My name is JOSE    I am  with [facility]  [facility]  [address] [address] [phone].    WELCOME TO OUR PRACTICE! PLEASE CALL OUR OFFICE WITH ANY QUESTIONS.     Thank you, and have a great day.

## 2022-12-08 ENCOUNTER — TELEPHONE (OUTPATIENT)
Dept: PSYCHIATRY | Facility: CLINIC | Age: 27
End: 2022-12-08

## 2022-12-08 NOTE — TELEPHONE ENCOUNTER
ATTEMPTED TO CONTACT PT PER OVERDUE LAB ORDERS PROTOCOL    PT(PATIENT) HAS OVERDUE LAB ORDERS   Urine Drug Screen - Urine, Clean Catch (11/22/2022 12:24)  hCG, Serum, Qualitative (11/22/2022 12:24)      NO ANSWER      LEFT VOICEMAIL WITH INSTRUCTIONS TO RETURN CALL TO OFFICE AT [phone]

## 2022-12-22 ENCOUNTER — OFFICE VISIT (OUTPATIENT)
Dept: PSYCHIATRY | Facility: CLINIC | Age: 27
End: 2022-12-22

## 2022-12-22 VITALS
HEART RATE: 77 BPM | SYSTOLIC BLOOD PRESSURE: 113 MMHG | WEIGHT: 208.2 LBS | HEIGHT: 64 IN | BODY MASS INDEX: 35.55 KG/M2 | DIASTOLIC BLOOD PRESSURE: 77 MMHG

## 2022-12-22 DIAGNOSIS — F31.81 BIPOLAR II DISORDER: Primary | ICD-10-CM

## 2022-12-22 DIAGNOSIS — Z86.59 HISTORY OF ANXIETY DISORDER: ICD-10-CM

## 2022-12-22 DIAGNOSIS — F12.90 MARIJUANA USE: ICD-10-CM

## 2022-12-22 DIAGNOSIS — F90.2 ADHD (ATTENTION DEFICIT HYPERACTIVITY DISORDER), COMBINED TYPE: ICD-10-CM

## 2022-12-22 DIAGNOSIS — F17.211 NICOTINE DEPENDENCE, CIGARETTES, IN REMISSION: ICD-10-CM

## 2022-12-22 DIAGNOSIS — F43.10 POST TRAUMATIC STRESS DISORDER (PTSD): ICD-10-CM

## 2022-12-22 PROCEDURE — 90833 PSYTX W PT W E/M 30 MIN: CPT | Performed by: PSYCHIATRY & NEUROLOGY

## 2022-12-22 PROCEDURE — 99214 OFFICE O/P EST MOD 30 MIN: CPT | Performed by: PSYCHIATRY & NEUROLOGY

## 2022-12-22 RX ORDER — LAMOTRIGINE 100 MG/1
100 TABLET ORAL DAILY
Qty: 14 TABLET | Refills: 0 | Status: SHIPPED | OUTPATIENT
Start: 2022-12-22 | End: 2023-01-24

## 2022-12-22 RX ORDER — LAMOTRIGINE 150 MG/1
150 TABLET ORAL DAILY
Qty: 20 TABLET | Refills: 0 | Status: SHIPPED | OUTPATIENT
Start: 2022-12-22 | End: 2023-01-24 | Stop reason: SDUPTHER

## 2022-12-22 NOTE — PROGRESS NOTES
"[facility]  Follow-up Visit    Chief Complaint: \"I talked to my doctor about it... an ADHD diagnosis or tested for it.\"    History of Present Illness: Shruti Pearl is a 27 y.o. female who presents today for follow-up regarding bipolar II, ADHD, PTSD, anxiety in the context of marijuana use. Last seen: 11/22 at which time lamotrigine was started. She presents unaccompanied in no acute distress and engages with me appropriately. Psychotropic regimen perceived to be effective. Side-effects per given history: denies.    Current treatment regimen includes:   - lamotrigine 50 mg QD    Today the patient feels she's doing fairly well, no significant changes to mood. Anxiety is tolerable. No significant changes to stressors outside of increased holiday work hours. Discussed prospects of stimulant medications for ADHD at her next visit. Thought process and content are devoid of overt aberration suggestive of acute celia/psychosis. The patient denies SI/HI/AVH. Work has been prohibitive with regard to reaching out to her potential therapist; increased hours 2/2 holiday demands. There are no overt changes on exam today compared to most recent evaluation.  - contextual changes: holiday stressors (transient); patient has taken several pregnancy tests that have been negative thus far; has cut back on caffeine; has drawn some; has been doing some drawing  - sleep: no changes, feels this is adequate  - appetite: no change    I have counseled the patient with regard to diagnoses and the recommended treatment regimen as documented below. Patient acknowledges the diagnoses per my rendered interpretation. Patient demonstrates understanding of potential risks/benefits/side effects associated with this regimen and is amenable to proceed in this fashion.     Assignment: continue to reduce caffeine intake, mitigate marijuana use over time.    Psychotherapy  - Time: 18 minutes  - interventions " employed: the therapeutic alliance was strengthened to encourage the patient to express their thoughts and feelings freely. Esteem building was enhanced through praise, reassurance, normalizing/challenging, and encouragement as appropriate. Coping skills were enhanced to build distress tolerance skills and emotional regulation. Allowed patient to freely discuss issues without interruption or judgement with unconditional positive regard, active listening skills, and empathy. Provided a safe, confidential environment to facilitate the development of a positive therapeutic relationship and encourage open, honest communication. Assisted patient in processing session content; acknowledged and normalized/addressed, as appropriate, patient’s thoughts, feelings, and concerns by utilizing a person-centered approach in efforts to build appropriate rapport and a positive therapeutic relationship with open and honest communication.   - Diagnoses: see assessment and plan below  - Symptoms: see subjective above  - Goals   - patient: improve executive function, maintain mood stability, maintain effective anxiety management   - provider: challenge patterns of living conducive to pathology, strengthen defenses, promote problems solving, restore adaptive functioning and provide symptom relief.  - Treatment plan: continue supportive psychotherapy in subsequent appointments to provide symptom relief; see assessment and plan below for additional details:   - iteration: 1   - progress: good   - (X)illumination, (X)contextualization, (X)detection, (working)development, (-)elaboration, (-)refinement  - functional status: good  - mental status exam: as below  - prognosis: good    Psychiatric History:  - Pertinent interval changes: N/A  - Psychotropic medication trials: buspirone (poor adherence), escitalopram (diminished effectiveness), lamotrigine, trazodone, aripiprazole  - Key synopsis: no SA hx; + cutting as recent as 2018    Substance  Abuse History:   - Pertinent interval changes: N/A  - Key synopsis: marijuana nightly / usually smokes, but does use edibles episodically; abused alcohol in the past    Social History:  - Pertinent interval changes: denies  - Key synopsis: lives in a house with her 9 YO son, Nick, and the patient's sister; works [employer]; some college; had speech therapy for stuttering and pronunciation until 5th grade; +neglect hx (especially in adolescence); painting, drawing;     Social History     Socioeconomic History   • Marital status: Single   Tobacco Use   • Smoking status: Former     Packs/day: 4.00     Years: 2.00     Pack years: 8.00     Types: Cigarettes     Passive exposure: Past   • Smokeless tobacco: Never   Vaping Use   • Vaping Use: Never used   Substance and Sexual Activity   • Alcohol use: Yes     Comment: patient states occ   • Drug use: Yes     Types: Marijuana   • Sexual activity: Defer       Tobacco use counseling/intervention: N/A, patient does not use tobacco; patient has been counseled with regard to risks of tobacco use.    PHQ-9 Depression Screening  PHQ-9 Total Score:      Little interest or pleasure in doing things?     Feeling down, depressed, or hopeless?     Trouble falling or staying asleep, or sleeping too much?     Feeling tired or having little energy?     Poor appetite or overeating?     Feeling bad about yourself - or that you are a failure or have let yourself or your family down?     Trouble concentrating on things, such as reading the newspaper or watching television?     Moving or speaking so slowly that other people could have noticed? Or the opposite - being so fidgety or restless that you have been moving around a lot more than usual?     Thoughts that you would be better off dead, or of hurting yourself in some way?     PHQ-9 Total Score       Change in PHQ-9 since last measure: N/A (6)    LILIAN-7       Change in LILIAN-7 since last measure: N/A (3)    Problem List:  Patient  Active Problem List   Diagnosis   • History of anxiety disorder   • Asthma   • Bipolar II disorder (HCC)   • Placental abruption   • Premature labor after 22 weeks and before 37 weeks without delivery   • Status post  delivery   • ADHD (attention deficit hyperactivity disorder), combined type   • Post traumatic stress disorder (PTSD)   • Marijuana use   • Nicotine dependence, cigarettes, in remission     Allergy:   Allergies   Allergen Reactions   • Erythromycin Unknown - Low Severity   • Nitrofurantoin Macrocrystal Hives   • Penicillins Hives        Discontinued Medications:  Medications Discontinued During This Encounter   Medication Reason   • cefdinir (OMNICEF) 300 MG capsule *Therapy completed   • methylPREDNISolone (MEDROL) 4 MG dose pack *Therapy completed   • lamoTRIgine (LaMICtal) 25 MG tablet Reorder       Current Medications:   Current Outpatient Medications   Medication Sig Dispense Refill   • lamoTRIgine (LaMICtal) 100 MG tablet Take 1 tablet by mouth Daily for 14 days. Take 1 tablet by mouth daily for 2 weeks, then take 2 tablets by mouth daily until your next appointment 14 tablet 0   • vitamin D (ERGOCALCIFEROL) 1.25 MG (88291 UT) capsule capsule Take 1 capsule by mouth 1 (One) Time Per Week. 13 capsule 1   • lamoTRIgine (LaMICtal) 150 MG tablet Take 1 tablet by mouth Daily for 20 days. Please begin taking this medication after completing 14 days of lamotrigine 100 mg daily 20 tablet 0     No current facility-administered medications for this visit.     Past Medical History:  Past Medical History:   Diagnosis Date   • Allergies    • Anxiety    • Asthma    • Bipolar disorder (HCC)    • Depression    • Kidney stone    • Panic disorder    • PTSD (post-traumatic stress disorder)      Past Surgical History:  Past Surgical History:   Procedure Laterality Date   •  SECTION       Mental Status Exam:   Appearance: well-groomed, sits upright, age-appropriate, above average habitus  Behavior:  "calm, cooperative, appropriate in demeanor, appropriate eye-contact  Mood/affect: euthymic / mood-congruent and appropriate in both range and amplitude  Speech: within expected variance; appropriate rate, appropriate rhythm, appropriate tone; non-pressured  Thought Process: linear, goal-directed; no FOI or JOSE; abstraction intact  Thought Content: coherent, devoid of overt delusions/perceptual disturbances  SI/HI: denies both SI and HI; exhibits future-orientation, self-advocates appropriately, no regular self-harm, no appreciable intent  Memory: no overt deficits  Orientation: oriented to person/place/time/situation  Concentration: appropriate during interview, +subjective deficits  Intellectual capacity: presumptively average  Insight: fair by given history/exam  Judgment: appropriate by given history/exam  Psychomotor: fidgets  Gait: WNL    Review of Systems:  Review of Systems   Constitutional: Negative for activity change, appetite change and unexpected weight change.   HENT: Negative for drooling.    Eyes: Negative for visual disturbance.   Respiratory: Negative for chest tightness and shortness of breath.    Cardiovascular: Negative for chest pain and palpitations.   Gastrointestinal: Positive for nausea. Negative for abdominal pain, diarrhea and vomiting.   Endocrine: Negative for cold intolerance and heat intolerance.   Genitourinary: Negative for difficulty urinating and frequency.   Musculoskeletal: Negative for myalgias and neck stiffness.   Skin: Negative for rash.   Neurological: Negative for dizziness, tremors, seizures and light-headedness.     Vital Signs:   /77   Pulse 77   Ht 162.6 cm (64\")   Wt 94.4 kg (208 lb 3.2 oz)   BMI 35.74 kg/m²      Lab Results:   Admission on 12/06/2022, Discharged on 12/06/2022   Component Date Value Ref Range Status   • SARS Antigen 12/06/2022 Not Detected   Final   • Internal Control 12/06/2022 Passed   Final   • Lot Number 12/06/2022 [id number]   Final   • " Expiration Date 12/06/2022 07/04/2023   Final   • Rapid Influenza A Ag 12/06/2022 Negative   Final   • Rapid Influenza B Ag 12/06/2022 Negative   Final   • Internal Control 12/06/2022 Passed   Final   • Lot Number 12/06/2022 [id number]   Final   • Expiration Date 12/06/2022 03/26/2024   Final   • Rapid Strep A Screen 12/06/2022 Negative   Final   • Internal Control 12/06/2022 Passed   Final   • Lot Number 12/06/2022 [id number]   Final   • Expiration Date 12/06/2022 02/28/2024   Final   Lab on 11/22/2022   Component Date Value Ref Range Status   • Vitamin B-12 11/22/2022 675  211 - 946 pg/mL Final   • Total Cholesterol 11/22/2022 165  0 - 200 mg/dL Final   • Triglycerides 11/22/2022 111  0 - 150 mg/dL Final   • HDL Cholesterol 11/22/2022 56  40 - 60 mg/dL Final   • LDL Cholesterol  11/22/2022 89  0 - 100 mg/dL Final   • VLDL Cholesterol 11/22/2022 20  5 - 40 mg/dL Final   • LDL/HDL Ratio 11/22/2022 1.55   Final   • TSH 11/22/2022 1.840  0.270 - 4.200 uIU/mL Final   • 25 Hydroxy, Vitamin D 11/22/2022 14.8 (L)  30.0 - 100.0 ng/ml Final   • Glucose 11/22/2022 90  65 - 99 mg/dL Final   • BUN 11/22/2022 12  6 - 20 mg/dL Final   • Creatinine 11/22/2022 0.74  0.57 - 1.00 mg/dL Final   • Sodium 11/22/2022 140  136 - 145 mmol/L Final   • Potassium 11/22/2022 4.2  3.5 - 5.2 mmol/L Final   • Chloride 11/22/2022 108 (H)  98 - 107 mmol/L Final   • CO2 11/22/2022 24.0  22.0 - 29.0 mmol/L Final   • Calcium 11/22/2022 9.2  8.6 - 10.5 mg/dL Final   • Total Protein 11/22/2022 7.1  6.0 - 8.5 g/dL Final   • Albumin 11/22/2022 4.10  3.50 - 5.20 g/dL Final   • ALT (SGPT) 11/22/2022 27  1 - 33 U/L Final   • AST (SGOT) 11/22/2022 22  1 - 32 U/L Final   • Alkaline Phosphatase 11/22/2022 62  39 - 117 U/L Final   • Total Bilirubin 11/22/2022 1.4 (H)  0.0 - 1.2 mg/dL Final   • Globulin 11/22/2022 3.0  gm/dL Final   • A/G Ratio 11/22/2022 1.4  g/dL Final   • BUN/Creatinine Ratio 11/22/2022 16.2  7.0 - 25.0 Final   • Anion Gap 11/22/2022 8.0  5.0 -  15.0 mmol/L Final   • eGFR 11/22/2022 114.6  >60.0 mL/min/1.73 Final    National Kidney Foundation and American Society of Nephrology (ASN) Task Force recommended calculation based on the Chronic Kidney Disease Epidemiology Collaboration (CKD-EPI) equation refit without adjustment for race.   • WBC 11/22/2022 5.73  3.40 - 10.80 10*3/mm3 Final   • RBC 11/22/2022 5.15  3.77 - 5.28 10*6/mm3 Final   • Hemoglobin 11/22/2022 15.4  12.0 - 15.9 g/dL Final   • Hematocrit 11/22/2022 45.6  34.0 - 46.6 % Final   • MCV 11/22/2022 88.5  79.0 - 97.0 fL Final   • MCH 11/22/2022 29.9  26.6 - 33.0 pg Final   • MCHC 11/22/2022 33.8  31.5 - 35.7 g/dL Final   • RDW 11/22/2022 12.1 (L)  12.3 - 15.4 % Final   • RDW-SD 11/22/2022 39.4  37.0 - 54.0 fl Final   • MPV 11/22/2022 10.5  6.0 - 12.0 fL Final   • Platelets 11/22/2022 250  140 - 450 10*3/mm3 Final   • Neutrophil % 11/22/2022 60.4  42.7 - 76.0 % Final   • Lymphocyte % 11/22/2022 27.9  19.6 - 45.3 % Final   • Monocyte % 11/22/2022 7.5  5.0 - 12.0 % Final   • Eosinophil % 11/22/2022 3.3  0.3 - 6.2 % Final   • Basophil % 11/22/2022 0.7  0.0 - 1.5 % Final   • Immature Grans % 11/22/2022 0.2  0.0 - 0.5 % Final   • Neutrophils, Absolute 11/22/2022 3.46  1.70 - 7.00 10*3/mm3 Final   • Lymphocytes, Absolute 11/22/2022 1.60  0.70 - 3.10 10*3/mm3 Final   • Monocytes, Absolute 11/22/2022 0.43  0.10 - 0.90 10*3/mm3 Final   • Eosinophils, Absolute 11/22/2022 0.19  0.00 - 0.40 10*3/mm3 Final   • Basophils, Absolute 11/22/2022 0.04  0.00 - 0.20 10*3/mm3 Final   • Immature Grans, Absolute 11/22/2022 0.01  0.00 - 0.05 10*3/mm3 Final   • nRBC 11/22/2022 0.0  0.0 - 0.2 /100 WBC Final   Office Visit on 10/28/2022   Component Date Value Ref Range Status   • Color 10/28/2022 Yellow  Yellow, Straw, Dark Yellow, Candi Final   • Clarity, UA 10/28/2022 Clear  Clear Final   • Specific Gravity  10/28/2022 1.025  1.005 - 1.030 Final   • pH, Urine 10/28/2022 7.5  5.0 - 8.0 Final   • Leukocytes 10/28/2022 Negative   Negative Final   • Nitrite, UA 10/28/2022 Negative  Negative Final   • Protein, POC 10/28/2022 Negative  Negative mg/dL Final   • Glucose, UA 10/28/2022 Negative  Negative mg/dL Final   • Ketones, UA 10/28/2022 Negative  Negative Final   • Urobilinogen, UA 10/28/2022 Normal  Normal, 0.2 E.U./dL Final   • Bilirubin 10/28/2022 Negative  Negative Final   • Blood, UA 10/28/2022 Negative  Negative Final   • Lot Number 10/28/2022 [id number]   Final   • Expiration Date 10/28/2022 [id number]   Final   • Diagnosis 10/28/2022 Comment   Final    NEGATIVE FOR INTRAEPITHELIAL LESION OR MALIGNANCY.  CELLULAR CHANGES ASSOCIATED WITH INFLAMMATION ARE PRESENT.   • Specimen adequacy: 10/28/2022 Comment   Final    Satisfactory for evaluation.  Endocervical and/or squamous metaplastic  cells (endocervical component) are present.   • Clinician Provided ICD-10: 10/28/2022 Comment   Final    Z12.4   • Performed by: 10/28/2022 Comment   Final    Sierra Jensen, Cytotechnologist   • . 10/28/2022 .   Final   • Note: 10/28/2022 Comment   Final    The Pap smear is a screening test designed to aid in the detection of  premalignant and malignant conditions of the uterine cervix.  It is not a  diagnostic procedure and should not be used as the sole means of detecting  cervical cancer.  Both false-positive and false-negative reports do occur.   • Method: 10/28/2022 Comment   Final    This liquid based ThinPrep(R) pap test was screened with the  use of an image guided system.   • HPV Aptima 10/28/2022 Positive (A)  Negative Final    This nucleic acid amplification test detects fourteen high-risk  HPV types (16,18,31,33,35,39,45,51,52,56,58,59,66,68) without  differentiation.   • HCG, Urine, QL 10/28/2022 Negative  Negative Final   • Lot Number 10/28/2022 [id number]   Final   • Internal Positive Control 10/28/2022 Passed  Positive, Passed Final   • Internal Negative Control 10/28/2022 Passed  Negative, Passed Final   • Expiration Date 10/28/2022  [id number]   Final     EKG Results:  No orders to display     Imaging Results:  CT Abdomen Pelvis With Contrast  Result Date: 11/22/2022    1. No acute findings are seen.  Trace fluid in the distal esophagus could suggest reflux.  Previously seen thickening of distal ileum and terminal ileum is not apparent on today's exam.  There is no evidence for appendicitis.     DANNY KELSEY MD      ASSESSMENT AND PLAN:    ICD-10-CM ICD-9-CM   1. Bipolar II disorder (HCC)  F31.81 296.89   2. ADHD (attention deficit hyperactivity disorder), combined type  F90.2 314.01   3. Post traumatic stress disorder (PTSD)  F43.10 309.81   4. History of anxiety disorder  Z86.59 V11.8   5. Nicotine dependence, cigarettes, in remission  F17.211 V15.82   6. Marijuana use  F12.90 305.20     27 y.o. female who presents today for follow-up regarding bipolar II, ADHD, PTSD, anxiety in the context of marijuana use. We have discussed the interval history and the treatment plan below, including potential R/B/SE of the recommended regimen of which the patient demonstrates understanding. Patient is agreeable to call 911 or go to the nearest ER should she become concerned for her own safety and/or the safety of those around her. There are no medication side effects or related complaints today. There are no overt indices of acute celia/psychosis on exam today.    1. Medication regimen: titrate lamotrigine, patient should be taking 150 mg QD at next visit at which time stimulant options will be discussed for her ADHD sx; patient is advised not to misuse prescribed medications or to use them with any exogenous substances that aren't disclosed to this provider as they may interact with the regimen to the patient's detriment.   2. Risk Assessment: protracted risk is moderate, imminent risk is low - no interval change. Do note that this is subject to change with the Faith of new stressors, treatment non-adherence, use of substances, and/or new medical  ails.   3. Monitoring: reviewed labs/imaging as populated above; UDS ordered  4. Therapy: yet to reach out  5. Follow-up: 1 month  6. Communications: N/A    TREATMENT PLAN/GOALS: challenge patterns of living conducive to symptom burden, change recommended regimen as above with augmentative, intermittent supportive psychotherapy to reduce symptom burden. Patient acknowledged and verbally consented to continue treatment. The importance of adherence to the recommended treatment and interval follow-up appointments was again emphasized today: patient has good treatment adherence per given history. Patient was today reminded to limit daily caffeine intake, hydrate appropriately, eat healthy and nutritious foods, engage sleep hygiene measures, engage appropriate exposure to sunlight, engage with hobbies in balance with life necessities, and exercise appropriate to their capacity to do so.     Billing: This encounter is of moderate complexity based on number/complexity of problems addressed today and risk of complications/morbidity: 2+ stable chronic illnesses and prescription management. Additionally, I provided 18 minutes of dedicated psychotherapy to the patient as documented above.    Parts of this note are electronic transcriptions/translations of spoken language to printed text using the Dragon Dictation system.    Electronically signed by Solo Hennessy MD, 12/22/22, [id number]

## 2023-01-16 ENCOUNTER — TELEPHONE (OUTPATIENT)
Dept: FAMILY MEDICINE CLINIC | Facility: CLINIC | Age: 28
End: 2023-01-16
Payer: COMMERCIAL

## 2023-01-23 ENCOUNTER — OFFICE VISIT (OUTPATIENT)
Dept: OBSTETRICS AND GYNECOLOGY | Facility: CLINIC | Age: 28
End: 2023-01-23
Payer: COMMERCIAL

## 2023-01-23 ENCOUNTER — LAB (OUTPATIENT)
Dept: LAB | Facility: HOSPITAL | Age: 28
End: 2023-01-23
Payer: COMMERCIAL

## 2023-01-23 VITALS
WEIGHT: 212 LBS | SYSTOLIC BLOOD PRESSURE: 112 MMHG | BODY MASS INDEX: 36.19 KG/M2 | HEART RATE: 83 BPM | HEIGHT: 64 IN | DIASTOLIC BLOOD PRESSURE: 79 MMHG

## 2023-01-23 DIAGNOSIS — R87.810 CERVICAL HIGH RISK HPV (HUMAN PAPILLOMAVIRUS) TEST POSITIVE: ICD-10-CM

## 2023-01-23 DIAGNOSIS — F31.81 BIPOLAR II DISORDER: ICD-10-CM

## 2023-01-23 DIAGNOSIS — N92.6 IRREGULAR MENSES: Primary | ICD-10-CM

## 2023-01-23 LAB
AMPHET+METHAMPHET UR QL: NEGATIVE
BARBITURATES UR QL SCN: NEGATIVE
BENZODIAZ UR QL SCN: NEGATIVE
CANNABINOIDS SERPL QL: POSITIVE
COCAINE UR QL: NEGATIVE
HCG INTACT+B SERPL-ACNC: <1 MIU/ML
HCG SERPL QL: NEGATIVE
METHADONE UR QL SCN: NEGATIVE
OPIATES UR QL: NEGATIVE
OXYCODONE UR QL SCN: NEGATIVE
TSH SERPL DL<=0.05 MIU/L-ACNC: 1.8 UIU/ML (ref 0.27–4.2)

## 2023-01-23 PROCEDURE — 84702 CHORIONIC GONADOTROPIN TEST: CPT | Performed by: NURSE PRACTITIONER

## 2023-01-23 PROCEDURE — 80307 DRUG TEST PRSMV CHEM ANLYZR: CPT

## 2023-01-23 PROCEDURE — 84703 CHORIONIC GONADOTROPIN ASSAY: CPT

## 2023-01-23 PROCEDURE — 84443 ASSAY THYROID STIM HORMONE: CPT | Performed by: NURSE PRACTITIONER

## 2023-01-23 PROCEDURE — 36415 COLL VENOUS BLD VENIPUNCTURE: CPT

## 2023-01-23 PROCEDURE — 99203 OFFICE O/P NEW LOW 30 MIN: CPT | Performed by: NURSE PRACTITIONER

## 2023-01-23 NOTE — PROGRESS NOTES
"GYN Visit    CC:   Chief Complaint   Patient presents with   • Gynecologic Exam     STI testing        HPI:   27 y.o. Contraception or HRT: Contraception:  None      Here for follow up on abnormal pap performed by PCP.  Has had HPV vaccine.    Cycle is approximately two months late.  Typically has a cycle every month.    Lasts 4-5 days.  Not using birth control. Denies any recent illness.  Home pregnancy tests have been negative.     History: PMHx, Meds, Allergies, PSHx, Social Hx, and POBHx all reviewed and updated.    Review of Systems   Constitutional: Negative.    Genitourinary: Positive for menstrual problem.        HPV positive       PHYSICAL EXAM:  /79   Pulse 83   Ht 162.6 cm (64.02\")   Wt 96.2 kg (212 lb)   BMI 36.37 kg/m²      Physical Exam  Vitals and nursing note reviewed.   Constitutional:       Appearance: Normal appearance. She is well-developed and well-groomed.   Neurological:      Mental Status: She is alert.   Psychiatric:         Attention and Perception: Attention and perception normal.         Mood and Affect: Affect normal.         Speech: Speech normal.         Behavior: Behavior is cooperative.         Cognition and Memory: Cognition normal.         ASSESSMENT AND PLAN:  Diagnoses and all orders for this visit:    1. Irregular menses (Primary)  Overview:  Will check TSH and beta HCG, if normal, may consider ultrasound and/or cycle induction with provera.     Orders:  -     hCG, Quantitative, Pregnancy  -     TSH    2. Cervical high risk HPV (human papillomavirus) test positive  Overview:  Reviewed ASCCP guidelines with patient, per guidelines recommend repeat pap in one year from date performed.  Verbalizes understanding.        Counseling:  • TRACK MENSES, RTO if <q21d, >7d long, heavy or painful.      Follow Up:  Return for Annual physical - due around 2023.          Renny Mcdaniels, APRN  2023    [facility] [address]  [facility] " [place]  [address] [place] [address] [place] [address]  Dept: [phone]  Loc: [phone]

## 2023-01-24 ENCOUNTER — OFFICE VISIT (OUTPATIENT)
Dept: PSYCHIATRY | Facility: CLINIC | Age: 28
End: 2023-01-24
Payer: COMMERCIAL

## 2023-01-24 VITALS
HEART RATE: 78 BPM | DIASTOLIC BLOOD PRESSURE: 86 MMHG | SYSTOLIC BLOOD PRESSURE: 121 MMHG | HEIGHT: 64 IN | WEIGHT: 209.5 LBS | BODY MASS INDEX: 35.77 KG/M2

## 2023-01-24 DIAGNOSIS — F90.2 ADHD (ATTENTION DEFICIT HYPERACTIVITY DISORDER), COMBINED TYPE: ICD-10-CM

## 2023-01-24 DIAGNOSIS — Z86.59 HISTORY OF ANXIETY DISORDER: ICD-10-CM

## 2023-01-24 DIAGNOSIS — F12.90 MARIJUANA USE: ICD-10-CM

## 2023-01-24 DIAGNOSIS — F43.10 POST TRAUMATIC STRESS DISORDER (PTSD): ICD-10-CM

## 2023-01-24 DIAGNOSIS — F31.81 BIPOLAR II DISORDER: Primary | ICD-10-CM

## 2023-01-24 DIAGNOSIS — F17.211 NICOTINE DEPENDENCE, CIGARETTES, IN REMISSION: ICD-10-CM

## 2023-01-24 PROCEDURE — 99214 OFFICE O/P EST MOD 30 MIN: CPT | Performed by: PSYCHIATRY & NEUROLOGY

## 2023-01-24 PROCEDURE — 90833 PSYTX W PT W E/M 30 MIN: CPT | Performed by: PSYCHIATRY & NEUROLOGY

## 2023-01-24 RX ORDER — LAMOTRIGINE 150 MG/1
150 TABLET ORAL DAILY
Qty: 30 TABLET | Refills: 0 | Status: SHIPPED | OUTPATIENT
Start: 2023-01-24 | End: 2023-04-05 | Stop reason: SDUPTHER

## 2023-01-24 RX ORDER — METHYLPHENIDATE HYDROCHLORIDE 10 MG/1
10 TABLET ORAL 2 TIMES DAILY
Qty: 60 TABLET | Refills: 0 | Status: SHIPPED | OUTPATIENT
Start: 2023-01-24 | End: 2023-04-05

## 2023-01-24 NOTE — PROGRESS NOTES
Patient aware of results wants to nakul on ultrasound right now will call back if does not start cycle in next few weeks.

## 2023-01-24 NOTE — PROGRESS NOTES
"[facility]  Follow-up Visit    Chief Complaint: \"I talked to my doctor about it... an ADHD diagnosis or tested for it.\"    History of Present Illness: Shruti Pearl is a 27 y.o. female who presents today for follow-up regarding bipolar II, ADHD, PTSD, anxiety in the context of marijuana use. Last seen: 12/22 at which time lamotrigine was titrated. She presents unaccompanied in no acute distress and engages with me appropriately. Psychotropic regimen perceived to be effective. Side-effects per given history: denies.    Current treatment regimen includes:   - lamotrigine 150 mg QD    Today the patient feels she's doing fairly well, feels lamotrigine is helpful for mood stability. Anxiety is tolerable. Discussed patterns of living, creative outlets today. Patient has yet to reach out to a therapist. Thought process and content are devoid of overt aberration suggestive of acute celia/psychosis. The patient denies SI/HI/AVH. There are no overt changes on exam today compared to most recent evaluation.  - contextual changes: has cut back on caffeine (1 coffee in the morning); has been doing some drawing (painted a cute little ghost for Ma's Day on a wood canvas)  - sleep: no changes  - appetite: no change    I have counseled the patient with regard to diagnoses and the recommended treatment regimen as documented below: I will be prescribing methylphenidate for ADHD. Patient has been made aware of the long-term risks of tachyphylaxis/dependence and uncomfortable withdrawal that may occur with abrupt discontinuation of this agent. Patient has been advised to monitor and limit caffeine intake while taking this agent. Patient has been made aware of common SE - diminished appetite, insomnia, hypertension - for which this agent has propensity. I have specifically reviewed issues related to misuse and diversion with the patient today. I have explained to the patient my personal " stance on use of marijuana: I do not take moral or medical issue with this substance, especially considering its medicinal use in several states across the nation. However, I do take issue with associated issues of dependence and its illicit status in [place]. I have reminded the patient that marijuana use in [place] may lead to serious legal consequences. I have advised the patient that mitigating or stopping use is ideal. However, I do not see this as an appropriate preclusion to use of methylphenidate to treat ADHD - primarily, I believe withdrawing treatment from patients like this is of more risk than it is benefit for several reasons: it encourages further self-medication, relapse with regard to anxiety/mood states, and doing so diminishes trust in the healthcare system and alliance with healthcare professionals, which will have obvious and longstanding implications with regard to treatment for the patient. I place more salient importance on patient safety and wellbeing, as well as on development of treatment alliance, than I do in enforcing superfluously strict/prohibitive treatment parameters. The patient is willing to work with me on this issue and is being forthcoming about use, so in the interest of patient safety/wellbeing, I will continue to prescribe this controlled agent irrespective to marijuana use - I have explicitly informed the patient about having no tolerance for other substances of abuse regarding positive results on UDS. Patient acknowledges the diagnoses per my rendered interpretation. Patient demonstrates understanding of potential risks/benefits/side effects associated with this regimen and is amenable to proceed in this fashion.     Assignment: continue to reduce caffeine intake, mitigate marijuana use over time.    Psychotherapy  - Time: 17 minutes  - interventions employed: the therapeutic alliance was strengthened to encourage the patient to express their thoughts and feelings  freely. Esteem building was enhanced through praise, reassurance, normalizing/challenging, and encouragement as appropriate. Coping skills were enhanced to build distress tolerance skills and emotional regulation. Allowed patient to freely discuss issues without interruption or judgement with unconditional positive regard, active listening skills, and empathy. Provided a safe, confidential environment to facilitate the development of a positive therapeutic relationship and encourage open, honest communication. Assisted patient in processing session content; acknowledged and normalized/addressed, as appropriate, patient’s thoughts, feelings, and concerns by utilizing a person-centered approach in efforts to build appropriate rapport and a positive therapeutic relationship with open and honest communication.   - Diagnoses: see assessment and plan below  - Symptoms: see subjective above  - Goals   - patient: improve executive function, maintain mood stability, maintain effective anxiety management   - provider: challenge patterns of living conducive to pathology, strengthen defenses, promote problems solving, restore adaptive functioning and provide symptom relief.  - Treatment plan: continue supportive psychotherapy in subsequent appointments to provide symptom relief; see assessment and plan below for additional details:   - iteration: 1   - progress: good   - (X)illumination, (X)contextualization, (X)detection, (working)development, (-)elaboration, (-)refinement  - functional status: good  - mental status exam: as below  - prognosis: good    Psychiatric History:  - Pertinent interval changes: N/A  - Psychotropic medication trials: buspirone (poor adherence), escitalopram (diminished effectiveness), lamotrigine, trazodone, aripiprazole  - Key synopsis: no SA hx; + cutting as recent as 2018    Substance Abuse History:   - Pertinent interval changes: N/A  - Key synopsis: marijuana nightly / usually smokes, but does use  edibles episodically; abused alcohol in the past    Social History:  - Pertinent interval changes: denies  - Key synopsis: lives in a house with her 7 YO son, Nick, and the patient's sister; works [employer]; some college; had speech therapy for stuttering and pronunciation until 5th grade; +neglect hx (especially in adolescence); painting, drawing;     Social History     Socioeconomic History   • Marital status: Single   Tobacco Use   • Smoking status: Former     Packs/day: 4.00     Years: 2.00     Pack years: 8.00     Types: Cigarettes     Passive exposure: Past   • Smokeless tobacco: Never   Vaping Use   • Vaping Use: Never used   Substance and Sexual Activity   • Alcohol use: Yes     Comment: patient states occ   • Drug use: Yes     Types: Marijuana   • Sexual activity: Yes     Tobacco use counseling/intervention: N/A, patient does not use tobacco; patient has been counseled with regard to risks of tobacco use.    PHQ-9 Depression Screening  PHQ-9 Total Score: 0    Little interest or pleasure in doing things? 0-->not at all   Feeling down, depressed, or hopeless? 0-->not at all   Trouble falling or staying asleep, or sleeping too much?     Feeling tired or having little energy?     Poor appetite or overeating?     Feeling bad about yourself - or that you are a failure or have let yourself or your family down?     Trouble concentrating on things, such as reading the newspaper or watching television?     Moving or speaking so slowly that other people could have noticed? Or the opposite - being so fidgety or restless that you have been moving around a lot more than usual?     Thoughts that you would be better off dead, or of hurting yourself in some way?     PHQ-9 Total Score 0     Change in PHQ-9 since last measure: -6 (6)    LILIAN-7  Feeling nervous, anxious or on edge: Not at all  Not being able to stop or control worrying: Not at all  Worrying too much about different things: Not at all  Trouble  Relaxing: Not at all  Being so restless that it is hard to sit still: Not at all  Feeling afraid as if something awful might happen: Not at all  Becoming easily annoyed or irritable: Not at all  LILIAN 7 Total Score: 0  If you checked any problems, how difficult have these problems made it for you to do your work, take care of things at home, or get along with other people: Not difficult at all    Change in LILIAN-7 since last measure: -3 (3)    Problem List:  Patient Active Problem List   Diagnosis   • History of anxiety disorder   • Asthma   • Bipolar II disorder (HCC)   • ADHD (attention deficit hyperactivity disorder), combined type   • Post traumatic stress disorder (PTSD)   • Marijuana use   • Nicotine dependence, cigarettes, in remission   • Cervical high risk HPV (human papillomavirus) test positive   • Irregular menses     Allergy:   Allergies   Allergen Reactions   • Erythromycin Unknown - Low Severity   • Nitrofurantoin Macrocrystal Hives   • Penicillins Hives        Discontinued Medications:  Medications Discontinued During This Encounter   Medication Reason   • lamoTRIgine (LaMICtal) 100 MG tablet *Therapy completed       Current Medications:   Current Outpatient Medications   Medication Sig Dispense Refill   • lamoTRIgine (LaMICtal) 150 MG tablet Take 1 tablet by mouth Daily for 20 days. Please begin taking this medication after completing 14 days of lamotrigine 100 mg daily 20 tablet 0   • vitamin D (ERGOCALCIFEROL) 1.25 MG (22325 UT) capsule capsule Take 1 capsule by mouth 1 (One) Time Per Week. 13 capsule 1     No current facility-administered medications for this visit.     Past Medical History:  Past Medical History:   Diagnosis Date   • Allergies    • Anxiety    • Asthma    • Bipolar disorder (HCC)    • Depression    • Kidney stone    • Panic disorder    • Placental abruption 8/6/2014   • Premature labor after 22 weeks and before 37 weeks without delivery 7/16/2014   • PTSD (post-traumatic stress  "disorder)    • Status post  delivery 2014     Past Surgical History:  Past Surgical History:   Procedure Laterality Date   •  SECTION       Mental Status Exam:   Appearance: well-groomed, sits upright, age-appropriate, above average habitus  Behavior: calm, cooperative, appropriate in demeanor, appropriate eye-contact  Mood/affect: euthymic / mood-congruent and appropriate in both range and amplitude  Speech: within expected variance; appropriate rate, appropriate rhythm, appropriate tone; non-pressured  Thought Process: linear, goal-directed; no FOI or JOSE; abstraction intact  Thought Content: coherent, devoid of overt delusions/perceptual disturbances  SI/HI: denies both SI and HI; exhibits future-orientation, self-advocates appropriately, no regular self-harm, no appreciable intent  Memory: no overt deficits  Orientation: oriented to person/place/time/situation  Concentration: appropriate during interview, +subjective deficits  Intellectual capacity: presumptively average  Insight: fair by given history/exam  Judgment: appropriate by given history/exam  Psychomotor: fidgets  Gait: WNL    Review of Systems:  Review of Systems   Constitutional: Negative for activity change, appetite change and unexpected weight change.   HENT: Negative for drooling.    Eyes: Negative for visual disturbance.   Respiratory: Negative for chest tightness and shortness of breath.    Cardiovascular: Negative for chest pain and palpitations.   Gastrointestinal: Negative for abdominal pain, diarrhea and nausea.   Endocrine: Negative for cold intolerance and heat intolerance.   Genitourinary: Negative for difficulty urinating and frequency.   Musculoskeletal: Negative for myalgias and neck stiffness.   Skin: Negative for rash.   Neurological: Negative for dizziness, tremors, seizures and light-headedness.     Vital Signs:   /86   Pulse 78   Ht 162.6 cm (64\")   Wt 95 kg (209 lb 8 oz)   BMI 35.96 kg/m²      Lab " Results:   Office Visit on 01/23/2023   Component Date Value Ref Range Status   • HCG Quantitative 01/23/2023 <1.00  mIU/mL Final   • TSH 01/23/2023 1.800  0.270 - 4.200 uIU/mL Final   Lab on 01/23/2023   Component Date Value Ref Range Status   • HCG Qualitative 01/23/2023 Negative  Negative Final   • Amphet/Methamphet, Screen 01/23/2023 Negative  Negative Final   • Barbiturates Screen, Urine 01/23/2023 Negative  Negative Final   • Benzodiazepine Screen, Urine 01/23/2023 Negative  Negative Final   • Cocaine Screen, Urine 01/23/2023 Negative  Negative Final   • Opiate Screen 01/23/2023 Negative  Negative Final   • THC, Screen, Urine 01/23/2023 Positive (A)  Negative Final   • Methadone Screen, Urine 01/23/2023 Negative  Negative Final   • Oxycodone Screen, Urine 01/23/2023 Negative  Negative Final   Admission on 12/06/2022, Discharged on 12/06/2022   Component Date Value Ref Range Status   • SARS Antigen 12/06/2022 Not Detected   Final   • Internal Control 12/06/2022 Passed   Final   • Lot Number 12/06/2022 [id number]   Final   • Expiration Date 12/06/2022 07/04/2023   Final   • Rapid Influenza A Ag 12/06/2022 Negative   Final   • Rapid Influenza B Ag 12/06/2022 Negative   Final   • Internal Control 12/06/2022 Passed   Final   • Lot Number 12/06/2022 [id number]   Final   • Expiration Date 12/06/2022 03/26/2024   Final   • Rapid Strep A Screen 12/06/2022 Negative   Final   • Internal Control 12/06/2022 Passed   Final   • Lot Number 12/06/2022 [id number]   Final   • Expiration Date 12/06/2022 02/28/2024   Final   Lab on 11/22/2022   Component Date Value Ref Range Status   • Vitamin B-12 11/22/2022 675  211 - 946 pg/mL Final   • Total Cholesterol 11/22/2022 165  0 - 200 mg/dL Final   • Triglycerides 11/22/2022 111  0 - 150 mg/dL Final   • HDL Cholesterol 11/22/2022 56  40 - 60 mg/dL Final   • LDL Cholesterol  11/22/2022 89  0 - 100 mg/dL Final   • VLDL Cholesterol 11/22/2022 20  5 - 40 mg/dL Final   • LDL/HDL Ratio 11/22/2022  1.55   Final   • TSH 11/22/2022 1.840  0.270 - 4.200 uIU/mL Final   • 25 Hydroxy, Vitamin D 11/22/2022 14.8 (L)  30.0 - 100.0 ng/ml Final   • Glucose 11/22/2022 90  65 - 99 mg/dL Final   • BUN 11/22/2022 12  6 - 20 mg/dL Final   • Creatinine 11/22/2022 0.74  0.57 - 1.00 mg/dL Final   • Sodium 11/22/2022 140  136 - 145 mmol/L Final   • Potassium 11/22/2022 4.2  3.5 - 5.2 mmol/L Final   • Chloride 11/22/2022 108 (H)  98 - 107 mmol/L Final   • CO2 11/22/2022 24.0  22.0 - 29.0 mmol/L Final   • Calcium 11/22/2022 9.2  8.6 - 10.5 mg/dL Final   • Total Protein 11/22/2022 7.1  6.0 - 8.5 g/dL Final   • Albumin 11/22/2022 4.10  3.50 - 5.20 g/dL Final   • ALT (SGPT) 11/22/2022 27  1 - 33 U/L Final   • AST (SGOT) 11/22/2022 22  1 - 32 U/L Final   • Alkaline Phosphatase 11/22/2022 62  39 - 117 U/L Final   • Total Bilirubin 11/22/2022 1.4 (H)  0.0 - 1.2 mg/dL Final   • Globulin 11/22/2022 3.0  gm/dL Final   • A/G Ratio 11/22/2022 1.4  g/dL Final   • BUN/Creatinine Ratio 11/22/2022 16.2  7.0 - 25.0 Final   • Anion Gap 11/22/2022 8.0  5.0 - 15.0 mmol/L Final   • eGFR 11/22/2022 114.6  >60.0 mL/min/1.73 Final    National Kidney Foundation and American Society of Nephrology (ASN) Task Force recommended calculation based on the Chronic Kidney Disease Epidemiology Collaboration (CKD-EPI) equation refit without adjustment for race.   • WBC 11/22/2022 5.73  3.40 - 10.80 10*3/mm3 Final   • RBC 11/22/2022 5.15  3.77 - 5.28 10*6/mm3 Final   • Hemoglobin 11/22/2022 15.4  12.0 - 15.9 g/dL Final   • Hematocrit 11/22/2022 45.6  34.0 - 46.6 % Final   • MCV 11/22/2022 88.5  79.0 - 97.0 fL Final   • MCH 11/22/2022 29.9  26.6 - 33.0 pg Final   • MCHC 11/22/2022 33.8  31.5 - 35.7 g/dL Final   • RDW 11/22/2022 12.1 (L)  12.3 - 15.4 % Final   • RDW-SD 11/22/2022 39.4  37.0 - 54.0 fl Final   • MPV 11/22/2022 10.5  6.0 - 12.0 fL Final   • Platelets 11/22/2022 250  140 - 450 10*3/mm3 Final   • Neutrophil % 11/22/2022 60.4  42.7 - 76.0 % Final   •  Lymphocyte % 11/22/2022 27.9  19.6 - 45.3 % Final   • Monocyte % 11/22/2022 7.5  5.0 - 12.0 % Final   • Eosinophil % 11/22/2022 3.3  0.3 - 6.2 % Final   • Basophil % 11/22/2022 0.7  0.0 - 1.5 % Final   • Immature Grans % 11/22/2022 0.2  0.0 - 0.5 % Final   • Neutrophils, Absolute 11/22/2022 3.46  1.70 - 7.00 10*3/mm3 Final   • Lymphocytes, Absolute 11/22/2022 1.60  0.70 - 3.10 10*3/mm3 Final   • Monocytes, Absolute 11/22/2022 0.43  0.10 - 0.90 10*3/mm3 Final   • Eosinophils, Absolute 11/22/2022 0.19  0.00 - 0.40 10*3/mm3 Final   • Basophils, Absolute 11/22/2022 0.04  0.00 - 0.20 10*3/mm3 Final   • Immature Grans, Absolute 11/22/2022 0.01  0.00 - 0.05 10*3/mm3 Final   • nRBC 11/22/2022 0.0  0.0 - 0.2 /100 WBC Final   Office Visit on 10/28/2022   Component Date Value Ref Range Status   • Color 10/28/2022 Yellow  Yellow, Straw, Dark Yellow, Candi Final   • Clarity, UA 10/28/2022 Clear  Clear Final   • Specific Gravity  10/28/2022 1.025  1.005 - 1.030 Final   • pH, Urine 10/28/2022 7.5  5.0 - 8.0 Final   • Leukocytes 10/28/2022 Negative  Negative Final   • Nitrite, UA 10/28/2022 Negative  Negative Final   • Protein, POC 10/28/2022 Negative  Negative mg/dL Final   • Glucose, UA 10/28/2022 Negative  Negative mg/dL Final   • Ketones, UA 10/28/2022 Negative  Negative Final   • Urobilinogen, UA 10/28/2022 Normal  Normal, 0.2 E.U./dL Final   • Bilirubin 10/28/2022 Negative  Negative Final   • Blood, UA 10/28/2022 Negative  Negative Final   • Lot Number 10/28/2022 [id number]   Final   • Expiration Date 10/28/2022 [id number]   Final   • Diagnosis 10/28/2022 Comment   Final    NEGATIVE FOR INTRAEPITHELIAL LESION OR MALIGNANCY.  CELLULAR CHANGES ASSOCIATED WITH INFLAMMATION ARE PRESENT.   • Specimen adequacy: 10/28/2022 Comment   Final    Satisfactory for evaluation.  Endocervical and/or squamous metaplastic  cells (endocervical component) are present.   • Clinician Provided ICD-10: 10/28/2022 Comment   Final    Z12.4   • Performed  by: 10/28/2022 Comment   Final    Sierra Jensen, Cytotechnologist   • . 10/28/2022 .   Final   • Note: 10/28/2022 Comment   Final    The Pap smear is a screening test designed to aid in the detection of  premalignant and malignant conditions of the uterine cervix.  It is not a  diagnostic procedure and should not be used as the sole means of detecting  cervical cancer.  Both false-positive and false-negative reports do occur.   • Method: 10/28/2022 Comment   Final    This liquid based ThinPrep(R) pap test was screened with the  use of an image guided system.   • HPV Aptima 10/28/2022 Positive (A)  Negative Final    This nucleic acid amplification test detects fourteen high-risk  HPV types (16,18,31,33,35,39,45,51,52,56,58,59,66,68) without  differentiation.   • HCG, Urine, QL 10/28/2022 Negative  Negative Final   • Lot Number 10/28/2022 [id number]   Final   • Internal Positive Control 10/28/2022 Passed  Positive, Passed Final   • Internal Negative Control 10/28/2022 Passed  Negative, Passed Final   • Expiration Date 10/28/[id number]   Final     EKG Results:  No orders to display     Imaging Results:  CT Abdomen Pelvis With Contrast  Result Date: 11/22/2022    1. No acute findings are seen.  Trace fluid in the distal esophagus could suggest reflux.  Previously seen thickening of distal ileum and terminal ileum is not apparent on today's exam.  There is no evidence for appendicitis.     DANNY KELSEY MD      ASSESSMENT AND PLAN:    ICD-10-CM ICD-9-CM   1. Bipolar II disorder (HCC)  F31.81 296.89   2. History of anxiety disorder  Z86.59 V11.8   3. ADHD (attention deficit hyperactivity disorder), combined type  F90.2 314.01   4. Post traumatic stress disorder (PTSD)  F43.10 309.81   5. Nicotine dependence, cigarettes, in remission  F17.211 V15.82   6. Marijuana use  F12.90 305.20     27 y.o. female who presents today for follow-up regarding bipolar II, ADHD, PTSD, anxiety in the context of marijuana use. We  have discussed the interval history and the treatment plan below, including potential R/B/SE of the recommended regimen of which the patient demonstrates understanding. Patient is agreeable to call 911 or go to the nearest ER should she become concerned for her own safety and/or the safety of those around her. There are no medication side effects or related complaints today. There are no overt indices of acute celia/psychosis on exam today.    1. Medication regimen: begin methylphenidate 10 mg BID, continue lamotrigine; patient is advised not to misuse prescribed medications or to use them with any exogenous substances that aren't disclosed to this provider as they may interact with the regimen to the patient's detriment.   2. Risk Assessment: protracted risk is moderate, imminent risk is low - no interval change. Do note that this is subject to change with the [place] of new stressors, treatment non-adherence, use of substances, and/or new medical ails.   3. Monitoring: reviewed labs/imaging as populated above  4. Therapy: yet to reach out  5. Follow-up: 1 month  6. Communications: N/A    TREATMENT PLAN/GOALS: challenge patterns of living conducive to symptom burden, change recommended regimen as above with augmentative, intermittent supportive psychotherapy to reduce symptom burden. Patient acknowledged and verbally consented to continue treatment. The importance of adherence to the recommended treatment and interval follow-up appointments was again emphasized today: patient has good treatment adherence per given history. Patient was today reminded to limit daily caffeine intake, hydrate appropriately, eat healthy and nutritious foods, engage sleep hygiene measures, engage appropriate exposure to sunlight, engage with hobbies in balance with life necessities, and exercise appropriate to their capacity to do so.     Billing: This encounter is of moderate complexity based on number/complexity of problems addressed  today and risk of complications/morbidity: 2+ stable chronic illnesses and prescription management. Additionally, I provided 17 minutes of dedicated psychotherapy to the patient as documented above.    Parts of this note are electronic transcriptions/translations of spoken language to printed text using the Dragon Dictation system.    Electronically signed by Solo Hennessy MD, 01/24/23, [id number]

## 2023-02-24 ENCOUNTER — HOSPITAL ENCOUNTER (EMERGENCY)
Facility: HOSPITAL | Age: 28
Discharge: HOME | End: 2023-02-24
Attending: EMERGENCY MEDICINE | Admitting: EMERGENCY MEDICINE
Payer: COMMERCIAL

## 2023-02-24 ENCOUNTER — APPOINTMENT (OUTPATIENT)
Dept: GENERAL RADIOLOGY | Facility: HOSPITAL | Age: 28
End: 2023-02-24
Payer: COMMERCIAL

## 2023-02-24 ENCOUNTER — APPOINTMENT (OUTPATIENT)
Dept: ULTRASOUND IMAGING | Facility: HOSPITAL | Age: 28
End: 2023-02-24
Payer: COMMERCIAL

## 2023-02-24 VITALS
DIASTOLIC BLOOD PRESSURE: 86 MMHG | RESPIRATION RATE: 16 BRPM | HEART RATE: 71 BPM | SYSTOLIC BLOOD PRESSURE: 133 MMHG | WEIGHT: 205.69 LBS | TEMPERATURE: 98.1 F | BODY MASS INDEX: 35.12 KG/M2 | OXYGEN SATURATION: 99 % | HEIGHT: 64 IN

## 2023-02-24 DIAGNOSIS — R10.11 RIGHT UPPER QUADRANT ABDOMINAL PAIN: Primary | ICD-10-CM

## 2023-02-24 DIAGNOSIS — R11.0 NAUSEA: ICD-10-CM

## 2023-02-24 LAB
ALBUMIN SERPL-MCNC: 4.2 G/DL (ref 3.5–5.2)
ALBUMIN/GLOB SERPL: 1.6 G/DL
ALP SERPL-CCNC: 66 U/L (ref 39–117)
ALT SERPL W P-5'-P-CCNC: 14 U/L (ref 1–33)
ANION GAP SERPL CALCULATED.3IONS-SCNC: 9.7 MMOL/L (ref 5–15)
AST SERPL-CCNC: 14 U/L (ref 1–32)
BASOPHILS # BLD AUTO: 0.03 10*3/MM3 (ref 0–0.2)
BASOPHILS NFR BLD AUTO: 0.5 % (ref 0–1.5)
BILIRUB SERPL-MCNC: 0.8 MG/DL (ref 0–1.2)
BILIRUB UR QL STRIP: NEGATIVE
BUN SERPL-MCNC: 10 MG/DL (ref 6–20)
BUN/CREAT SERPL: 13.5 (ref 7–25)
CALCIUM SPEC-SCNC: 9.1 MG/DL (ref 8.6–10.5)
CHLORIDE SERPL-SCNC: 106 MMOL/L (ref 98–107)
CLARITY UR: CLEAR
CO2 SERPL-SCNC: 24.3 MMOL/L (ref 22–29)
COLOR UR: YELLOW
CREAT SERPL-MCNC: 0.74 MG/DL (ref 0.57–1)
DEPRECATED RDW RBC AUTO: 36.9 FL (ref 37–54)
EGFRCR SERPLBLD CKD-EPI 2021: 113.9 ML/MIN/1.73
EOSINOPHIL # BLD AUTO: 0.23 10*3/MM3 (ref 0–0.4)
EOSINOPHIL NFR BLD AUTO: 3.7 % (ref 0.3–6.2)
ERYTHROCYTE [DISTWIDTH] IN BLOOD BY AUTOMATED COUNT: 12.3 % (ref 12.3–15.4)
GLOBULIN UR ELPH-MCNC: 2.7 GM/DL
GLUCOSE SERPL-MCNC: 114 MG/DL (ref 65–99)
GLUCOSE UR STRIP-MCNC: NEGATIVE MG/DL
HCG INTACT+B SERPL-ACNC: <0.5 MIU/ML
HCT VFR BLD AUTO: 40.9 % (ref 34–46.6)
HGB BLD-MCNC: 14.3 G/DL (ref 12–15.9)
HGB UR QL STRIP.AUTO: NEGATIVE
HOLD SPECIMEN: NORMAL
HOLD SPECIMEN: NORMAL
IMM GRANULOCYTES # BLD AUTO: 0.02 10*3/MM3 (ref 0–0.05)
IMM GRANULOCYTES NFR BLD AUTO: 0.3 % (ref 0–0.5)
KETONES UR QL STRIP: NEGATIVE
LEUKOCYTE ESTERASE UR QL STRIP.AUTO: NEGATIVE
LIPASE SERPL-CCNC: 33 U/L (ref 13–60)
LYMPHOCYTES # BLD AUTO: 1.97 10*3/MM3 (ref 0.7–3.1)
LYMPHOCYTES NFR BLD AUTO: 31.6 % (ref 19.6–45.3)
MCH RBC QN AUTO: 29.5 PG (ref 26.6–33)
MCHC RBC AUTO-ENTMCNC: 35 G/DL (ref 31.5–35.7)
MCV RBC AUTO: 84.3 FL (ref 79–97)
MONOCYTES # BLD AUTO: 0.42 10*3/MM3 (ref 0.1–0.9)
MONOCYTES NFR BLD AUTO: 6.7 % (ref 5–12)
NEUTROPHILS NFR BLD AUTO: 3.56 10*3/MM3 (ref 1.7–7)
NEUTROPHILS NFR BLD AUTO: 57.2 % (ref 42.7–76)
NITRITE UR QL STRIP: NEGATIVE
NRBC BLD AUTO-RTO: 0 /100 WBC (ref 0–0.2)
PH UR STRIP.AUTO: 6 [PH] (ref 5–8)
PLATELET # BLD AUTO: 233 10*3/MM3 (ref 140–450)
PMV BLD AUTO: 9.7 FL (ref 6–12)
POTASSIUM SERPL-SCNC: 3.6 MMOL/L (ref 3.5–5.2)
PROT SERPL-MCNC: 6.9 G/DL (ref 6–8.5)
PROT UR QL STRIP: NEGATIVE
RBC # BLD AUTO: 4.85 10*6/MM3 (ref 3.77–5.28)
SODIUM SERPL-SCNC: 140 MMOL/L (ref 136–145)
SP GR UR STRIP: 1.02 (ref 1–1.03)
UROBILINOGEN UR QL STRIP: NORMAL
WBC NRBC COR # BLD: 6.23 10*3/MM3 (ref 3.4–10.8)
WHOLE BLOOD HOLD COAG: NORMAL
WHOLE BLOOD HOLD SPECIMEN: NORMAL

## 2023-02-24 PROCEDURE — 76705 ECHO EXAM OF ABDOMEN: CPT

## 2023-02-24 PROCEDURE — 25010000002 DICYCLOMINE PER 20 MG: Performed by: NURSE PRACTITIONER

## 2023-02-24 PROCEDURE — 80053 COMPREHEN METABOLIC PANEL: CPT

## 2023-02-24 PROCEDURE — 83690 ASSAY OF LIPASE: CPT

## 2023-02-24 PROCEDURE — 99283 EMERGENCY DEPT VISIT LOW MDM: CPT

## 2023-02-24 PROCEDURE — 63710000001 ONDANSETRON ODT 4 MG TABLET DISPERSIBLE: Performed by: NURSE PRACTITIONER

## 2023-02-24 PROCEDURE — 96372 THER/PROPH/DIAG INJ SC/IM: CPT

## 2023-02-24 PROCEDURE — 81003 URINALYSIS AUTO W/O SCOPE: CPT

## 2023-02-24 PROCEDURE — 71045 X-RAY EXAM CHEST 1 VIEW: CPT

## 2023-02-24 PROCEDURE — 36415 COLL VENOUS BLD VENIPUNCTURE: CPT

## 2023-02-24 PROCEDURE — 85025 COMPLETE CBC W/AUTO DIFF WBC: CPT

## 2023-02-24 PROCEDURE — 84702 CHORIONIC GONADOTROPIN TEST: CPT

## 2023-02-24 RX ORDER — DICYCLOMINE HCL 20 MG
20 TABLET ORAL EVERY 6 HOURS
Qty: 20 TABLET | Refills: 0 | Status: SHIPPED | OUTPATIENT
Start: 2023-02-24 | End: 2023-04-05

## 2023-02-24 RX ORDER — ONDANSETRON 4 MG/1
4 TABLET, ORALLY DISINTEGRATING ORAL ONCE
Status: COMPLETED | OUTPATIENT
Start: 2023-02-24 | End: 2023-02-24

## 2023-02-24 RX ORDER — ONDANSETRON 4 MG/1
4 TABLET, ORALLY DISINTEGRATING ORAL 4 TIMES DAILY PRN
Qty: 15 TABLET | Refills: 0 | Status: SHIPPED | OUTPATIENT
Start: 2023-02-24

## 2023-02-24 RX ORDER — DICYCLOMINE HYDROCHLORIDE 10 MG/ML
20 INJECTION INTRAMUSCULAR ONCE
Status: COMPLETED | OUTPATIENT
Start: 2023-02-24 | End: 2023-02-24

## 2023-02-24 RX ORDER — SODIUM CHLORIDE 0.9 % (FLUSH) 0.9 %
10 SYRINGE (ML) INJECTION AS NEEDED
Status: DISCONTINUED | OUTPATIENT
Start: 2023-02-24 | End: 2023-02-24 | Stop reason: HOSPADM

## 2023-02-24 RX ADMIN — DICYCLOMINE HYDROCHLORIDE 20 MG: 20 INJECTION, SOLUTION INTRAMUSCULAR at 20:21

## 2023-02-24 RX ADMIN — ONDANSETRON 4 MG: 4 TABLET, ORALLY DISINTEGRATING ORAL at 20:21

## 2023-02-27 ENCOUNTER — OFFICE VISIT (OUTPATIENT)
Dept: FAMILY MEDICINE CLINIC | Facility: CLINIC | Age: 28
End: 2023-02-27
Payer: COMMERCIAL

## 2023-02-27 VITALS
HEART RATE: 88 BPM | HEIGHT: 64 IN | BODY MASS INDEX: 34.72 KG/M2 | WEIGHT: 203.4 LBS | OXYGEN SATURATION: 97 % | DIASTOLIC BLOOD PRESSURE: 59 MMHG | SYSTOLIC BLOOD PRESSURE: 104 MMHG

## 2023-02-27 DIAGNOSIS — R10.11 RIGHT UPPER QUADRANT ABDOMINAL PAIN: Primary | ICD-10-CM

## 2023-02-27 DIAGNOSIS — R11.0 NAUSEA: ICD-10-CM

## 2023-02-27 PROCEDURE — 99213 OFFICE O/P EST LOW 20 MIN: CPT | Performed by: NURSE PRACTITIONER

## 2023-02-27 NOTE — PROGRESS NOTES
"Chief Complaint  Abdominal Pain    Subjective      Shruti Pearl is a 28-year-old female that presents to [facility] for and ER F/U due to right upper quadrant abdominal pain.  She states that she is still having pain that is worse after eating and with deep breathing.  Pain radiates around into her back.  She also has nausea.  No vomiting or diarrhea.  She states that she does have some constipation.  Last bowel movement was approximately 2 days ago.  She states that this is not uncommon for her.  She was sent home with Bentyl and Zofran.  She states that the Bentyl is not helping her pain but zofran is helpful.  She denies fever.  History of Present Illness          Current Outpatient Medications   Medication Instructions   • dicyclomine (BENTYL) 20 mg, Oral, Every 6 Hours   • lamoTRIgine (LAMICTAL) 150 mg, Oral, Daily, Please begin taking this medication after completing 14 days of lamotrigine 100 mg daily   • methylphenidate (RITALIN) 10 mg, Oral, 2 Times Daily   • ondansetron ODT (ZOFRAN-ODT) 4 mg, Translingual, 4 Times Daily PRN   • vitamin D (ERGOCALCIFEROL) 50,000 Units, Oral, Weekly       The following portions of the patient's history were reviewed and updated as appropriate: allergies, current medications, past family history, past medical history, past social history, past surgical history, and problem list.    Objective   Vital Signs:   /59   Pulse 88   Ht 162.6 cm (64\")   Wt 92.3 kg (203 lb 6.4 oz)   SpO2 97%   BMI 34.91 kg/m²     Wt Readings from Last 3 Encounters:   02/27/23 92.3 kg (203 lb 6.4 oz)   02/24/23 93.3 kg (205 lb 11 oz)   01/24/23 95 kg (209 lb 8 oz)     BP Readings from Last 3 Encounters:   02/27/23 104/59   02/24/23 133/86   01/24/23 121/86     Physical Exam  Vitals reviewed.   Constitutional:       Appearance: Normal appearance. She is well-developed. She is obese. She is not ill-appearing.   HENT:      Head: Normocephalic and atraumatic. "   Eyes:      Conjunctiva/sclera: Conjunctivae normal.      Pupils: Pupils are equal, round, and reactive to light.   Cardiovascular:      Rate and Rhythm: Normal rate and regular rhythm.      Heart sounds: No murmur heard.  Pulmonary:      Effort: Pulmonary effort is normal.      Breath sounds: Normal breath sounds.   Abdominal:      General: Abdomen is flat. Bowel sounds are normal. There is no distension.      Palpations: There is no mass.      Tenderness: There is abdominal tenderness in the right upper quadrant and epigastric area. There is no guarding.   Skin:     General: Skin is warm and dry.   Neurological:      Mental Status: She is alert and oriented to person, place, and time.   Psychiatric:         Mood and Affect: Affect normal.          Result Review :  The following data was reviewed by: DILSHAD Casillas on 02/27/2023:      Common labs    Common Labs 11/22/22 11/22/22 11/22/22 2/24/23 2/24/23    0826 0826 0826 1803 1803   Glucose   90  114 (A)   BUN   12  10   Creatinine   0.74  0.74   Sodium   140  140   Potassium   4.2  3.6   Chloride   108 (A)  106   Calcium   9.2  9.1   Albumin   4.10  4.2   Total Bilirubin   1.4 (A)  0.8   Alkaline Phosphatase   62  66   AST (SGOT)   22  14   ALT (SGPT)   27  14   WBC 5.73   6.23    Hemoglobin 15.4   14.3    Hematocrit 45.6   40.9    Platelets 250   233    Total Cholesterol  165      Triglycerides  111      HDL Cholesterol  56      LDL Cholesterol   89      (A) Abnormal value              Lab Results (last 72 hours)     Procedure Component Value Units Date/Time    CBC & Differential [[id number]]  (Abnormal) Collected: 02/24/23 1803    Specimen: Blood Updated: 02/24/23 1820    Narrative:      The following orders were created for panel order CBC & Differential.  Procedure                               Abnormality         Status                     ---------                               -----------         ------                     CBC Auto  Differential[[id number]]        Abnormal            Final result                 Please view results for these tests on the individual orders.    Comprehensive Metabolic Panel [[id number]]  (Abnormal) Collected: 02/24/23 1803    Specimen: Blood Updated: 02/24/23 1852     Glucose 114 mg/dL      BUN 10 mg/dL      Creatinine 0.74 mg/dL      Sodium 140 mmol/L      Potassium 3.6 mmol/L      Chloride 106 mmol/L      CO2 24.3 mmol/L      Calcium 9.1 mg/dL      Total Protein 6.9 g/dL      Albumin 4.2 g/dL      ALT (SGPT) 14 U/L      AST (SGOT) 14 U/L      Alkaline Phosphatase 66 U/L      Total Bilirubin 0.8 mg/dL      Globulin 2.7 gm/dL      A/G Ratio 1.6 g/dL      BUN/Creatinine Ratio 13.5     Anion Gap 9.7 mmol/L      eGFR 113.9 mL/min/1.73     Narrative:      GFR Normal >60  Chronic Kidney Disease <60  Kidney Failure <15      Lipase [[id number]]  (Normal) Collected: 02/24/23 1803    Specimen: Blood Updated: 02/24/23 1852     Lipase 33 U/L     hCG, Quantitative, Pregnancy [[id number]] Collected: 02/24/23 1803    Specimen: Blood Updated: 02/24/23 1848     HCG Quantitative <0.50 mIU/mL     Narrative:      HCG Ranges by Gestational Age    Females - non-pregnant premenopausal   </= 1mIU/mL HCG  Females - postmenopausal               </= 7mIU/mL HCG    3 Weeks       5.4   -      72 mIU/mL  4 Weeks      10.2   -     708 mIU/mL  5 Weeks       217   -   8,245 mIU/mL  6 Weeks       152   -  32,177 mIU/mL  7 Weeks     4,059   - 153,767 mIU/mL  8 Weeks    31,366   - 149,094 mIU/mL  9 Weeks    59,109   - 135,901 mIU/mL  10 Weeks   44,186   - 170,409 mIU/mL  12 Weeks   27,107   - 201,615 mIU/mL  14 Weeks   24,302   -  93,646 mIU/mL  15 Weeks   12,540   -  69,747 mIU/mL  16 Weeks    8,904   -  55,332 mIU/mL  17 Weeks    8,240   -  51,793 mIU/mL  18 Weeks    9,649   -  55,271 mIU/mL    Results may be falsely decreased if patient taking Biotin.      CBC Auto Differential [[id number]]  (Abnormal) Collected: 02/24/23 4270    Specimen: Blood  Updated: 02/24/23 1820     WBC 6.23 10*3/mm3      RBC 4.85 10*6/mm3      Hemoglobin 14.3 g/dL      Hematocrit 40.9 %      MCV 84.3 fL      MCH 29.5 pg      MCHC 35.0 g/dL      RDW 12.3 %      RDW-SD 36.9 fl      MPV 9.7 fL      Platelets 233 10*3/mm3      Neutrophil % 57.2 %      Lymphocyte % 31.6 %      Monocyte % 6.7 %      Eosinophil % 3.7 %      Basophil % 0.5 %      Immature Grans % 0.3 %      Neutrophils, Absolute 3.56 10*3/mm3      Lymphocytes, Absolute 1.97 10*3/mm3      Monocytes, Absolute 0.42 10*3/mm3      Eosinophils, Absolute 0.23 10*3/mm3      Basophils, Absolute 0.03 10*3/mm3      Immature Grans, Absolute 0.02 10*3/mm3      nRBC 0.0 /100 WBC     Urinalysis With Microscopic If Indicated (No Culture) - Urine, Clean Catch [[id number]]  (Normal) Collected: 02/24/23 1811    Specimen: Urine, Clean Catch Updated: 02/24/23 1820     Color, UA Yellow     Appearance, UA Clear     pH, UA 6.0     Specific Gravity, UA 1.025     Glucose, UA Negative     Ketones, UA Negative     Bilirubin, UA Negative     Blood, UA Negative     Protein, UA Negative     Leuk Esterase, UA Negative     Nitrite, UA Negative     Urobilinogen, UA 1.0 E.U./dL    Narrative:      Urine microscopic not indicated.           CT Abdomen Pelvis With Contrast    Result Date: 11/22/2022    1. No acute findings are seen.  Trace fluid in the distal esophagus could suggest reflux.  Previously seen thickening of distal ileum and terminal ileum is not apparent on today's exam.  There is no evidence for appendicitis.     DANNY KELSEY MD       Electronically Signed and Approved By: DANNY KELSEY MD on 11/22/2022 at 16:22             US Gallbladder    Result Date: 2/24/2023    Right upper quadrant ultrasound demonstrating contracted gallbladder, as above.      IDALIA GARAY MD       Electronically Signed and Approved By: IDALIA GARAY MD on 2/24/2023 at 20:11             XR Chest 1 View    Result Date: 2/24/2023   No active disease is seen.        IDALIA GARAY MD       Electronically Signed and Approved By: IDALIA GARAY MD on 2/24/2023 at 20:23               Lab Results   Component Value Date    SARSANTIGEN Not Detected 12/06/2022    COVID19 NOT DETECTED 11/03/2020    RAPFLUA Negative 12/06/2022    RAPFLUB Negative 12/06/2022    RAPSCRN Negative 12/06/2022    POCPREGUR Negative 10/28/2022    BILIRUBINUR Negative 02/24/2023       Procedures        Assessment and Plan   Diagnoses and all orders for this visit:    1. Right upper quadrant abdominal pain (Primary)  -     NM HIDA Scan With Pharmacological Intervention; Future    2. Nausea      There are no discontinued medications.       Follow Up     Discussed plan of care.  HIDA scan ordered.  Encouraged to eat a bland diet avoiding fatty, greasy, fried and spicy foods.  Increase fluid intake.  Can also consider using miralax daily for constipation if needed.      No follow-ups on file.  Patient was given instructions and counseling regarding her condition or for health maintenance advice. Please see specific information pulled into the AVS if appropriate.       Gerda Mariee, APRN  02/27/23  14:05 EST

## 2023-03-10 ENCOUNTER — HOSPITAL ENCOUNTER (OUTPATIENT)
Dept: NUCLEAR MEDICINE | Facility: HOSPITAL | Age: 28
Discharge: HOME | End: 2023-03-10
Payer: COMMERCIAL

## 2023-03-10 DIAGNOSIS — R10.11 RIGHT UPPER QUADRANT ABDOMINAL PAIN: ICD-10-CM

## 2023-03-10 PROCEDURE — 78227 HEPATOBIL SYST IMAGE W/DRUG: CPT

## 2023-03-10 PROCEDURE — A9537 TC99M MEBROFENIN: HCPCS | Performed by: NURSE PRACTITIONER

## 2023-03-10 PROCEDURE — 0 TECHNETIUM TC 99M MEBROFENIN KIT: Performed by: NURSE PRACTITIONER

## 2023-03-10 RX ORDER — KIT FOR THE PREPARATION OF TECHNETIUM TC 99M MEBROFENIN 45 MG/10ML
1 INJECTION, POWDER, LYOPHILIZED, FOR SOLUTION INTRAVENOUS
Status: COMPLETED | OUTPATIENT
Start: 2023-03-10 | End: 2023-03-10

## 2023-03-10 RX ADMIN — KIT FOR THE PREPARATION OF TECHNETIUM TC 99M MEBROFENIN 1 DOSE: 45 INJECTION, POWDER, LYOPHILIZED, FOR SOLUTION INTRAVENOUS at 12:36

## 2023-04-05 ENCOUNTER — OFFICE VISIT (OUTPATIENT)
Dept: PSYCHIATRY | Facility: CLINIC | Age: 28
End: 2023-04-05
Payer: COMMERCIAL

## 2023-04-05 VITALS
WEIGHT: 203.2 LBS | HEIGHT: 64 IN | BODY MASS INDEX: 34.69 KG/M2 | SYSTOLIC BLOOD PRESSURE: 110 MMHG | DIASTOLIC BLOOD PRESSURE: 78 MMHG | HEART RATE: 79 BPM

## 2023-04-05 DIAGNOSIS — Z86.59 HISTORY OF ANXIETY DISORDER: ICD-10-CM

## 2023-04-05 DIAGNOSIS — F31.81 BIPOLAR II DISORDER: Primary | ICD-10-CM

## 2023-04-05 DIAGNOSIS — F43.10 POST TRAUMATIC STRESS DISORDER (PTSD): ICD-10-CM

## 2023-04-05 DIAGNOSIS — F12.90 MARIJUANA USE: ICD-10-CM

## 2023-04-05 DIAGNOSIS — F90.2 ADHD (ATTENTION DEFICIT HYPERACTIVITY DISORDER), COMBINED TYPE: ICD-10-CM

## 2023-04-05 PROCEDURE — 1160F RVW MEDS BY RX/DR IN RCRD: CPT | Performed by: PSYCHIATRY & NEUROLOGY

## 2023-04-05 PROCEDURE — 99214 OFFICE O/P EST MOD 30 MIN: CPT | Performed by: PSYCHIATRY & NEUROLOGY

## 2023-04-05 PROCEDURE — 1159F MED LIST DOCD IN RCRD: CPT | Performed by: PSYCHIATRY & NEUROLOGY

## 2023-04-05 RX ORDER — LAMOTRIGINE 150 MG/1
150 TABLET ORAL DAILY
Qty: 90 TABLET | Refills: 0 | Status: SHIPPED | OUTPATIENT
Start: 2023-04-05

## 2023-04-05 RX ORDER — DEXTROAMPHETAMINE SACCHARATE, AMPHETAMINE ASPARTATE, DEXTROAMPHETAMINE SULFATE AND AMPHETAMINE SULFATE 2.5; 2.5; 2.5; 2.5 MG/1; MG/1; MG/1; MG/1
10 TABLET ORAL DAILY
Qty: 30 TABLET | Refills: 0 | Status: SHIPPED | OUTPATIENT
Start: 2023-04-05

## 2023-04-05 NOTE — PROGRESS NOTES
"[facility]  Follow-up Visit    Chief Complaint: \"I talked to my doctor about it... an ADHD diagnosis or tested for it.\"    History of Present Illness: Shruti Pearl is a 28 y.o. female who presents today for follow-up regarding bipolar II, ADHD, PTSD, anxiety in the context of marijuana use. Last seen: 01/24 at which time methylphenidate was started. She presents unaccompanied in no acute distress and engages with me appropriately. Psychotropic regimen perceived to be effective. Side-effects per given history: nausea and headache with methylphenidate.    Current treatment regimen includes:   - lamotrigine 150 mg QD  - methylphenidate 10 mg BID (has stopped taking due to SE)    Today the patient feels she's doing fairly well, feels lamotrigine is helpful for mood stability. She reports she has been taking this consistently in recent time despite lapse in follow-up. Anxiety is tolerable. Has yet to reach out to a therapist, feels time is the biggest barrier given work. Thought process and content are devoid of overt aberration suggestive of acute celia/psychosis. The patient denies SI/HI/AVH. There are no overt changes on exam today compared to most recent evaluation.  - contextual changes: denies; has maintained lower doses of caffeine use  - sleep: no changes  - appetite: no change    I have counseled the patient with regard to diagnoses and the recommended treatment regimen as documented below: I will be prescribing amphetamine for ADHD. Patient has been made aware of the long-term risks of tachyphylaxis/dependence and uncomfortable withdrawal that may occur with abrupt discontinuation of this agent. Patient has been advised to monitor and limit caffeine intake while taking this agent. Patient has been made aware of common SE - diminished appetite, insomnia, hypertension - for which this agent has propensity. I have specifically reviewed issues related to misuse and diversion " with the patient today. Patient acknowledges the diagnoses per my rendered interpretation. Patient demonstrates understanding of potential risks/benefits/side effects associated with this regimen and is amenable to proceed in this fashion.     Assignment: continue to reduce caffeine intake, mitigate marijuana use over time.    Psychotherapy  - Time: 11 minutes  - interventions employed: the therapeutic alliance was strengthened to encourage the patient to express their thoughts and feelings freely. Esteem building was enhanced through praise, reassurance, normalizing/challenging, and encouragement as appropriate. Coping skills were enhanced to build distress tolerance skills and emotional regulation. Allowed patient to freely discuss issues without interruption or judgement with unconditional positive regard, active listening skills, and empathy. Provided a safe, confidential environment to facilitate the development of a positive therapeutic relationship and encourage open, honest communication. Assisted patient in processing session content; acknowledged and normalized/addressed, as appropriate, patient’s thoughts, feelings, and concerns by utilizing a person-centered approach in efforts to build appropriate rapport and a positive therapeutic relationship with open and honest communication.   - Diagnoses: see assessment and plan below  - Symptoms: see subjective above  - Goals   - patient: improve executive function, maintain mood stability, maintain effective anxiety management   - provider: challenge patterns of living conducive to pathology, strengthen defenses, promote problems solving, restore adaptive functioning and provide symptom relief.  - Treatment plan: continue supportive psychotherapy in subsequent appointments to provide symptom relief; see assessment and plan below for additional details:   - iteration: 1   - progress: good   - (X)illumination, (X)contextualization, (X)detection,  (working)development, (-)elaboration, (-)refinement  - functional status: good  - mental status exam: as below  - prognosis: good    Psychiatric History:  - Pertinent interval changes: N/A  - Psychotropic medication trials: buspirone (poor adherence), escitalopram (diminished effectiveness), lamotrigine, trazodone, aripiprazole, methylphenidate (persistent and intolerable nausea, headache at 10 mg BID)  - Key synopsis: no SA hx; + cutting as recent as 2018    Substance Abuse History:   - Pertinent interval changes: N/A  - Key synopsis: marijuana nightly / usually smokes, but does use edibles episodically; abused alcohol in the past    Social History:  - Pertinent interval changes: denies  - Key synopsis: lives in a house with her 7 YO son, Nick, and the patient's sister; works [employer]; some college; had speech therapy for stuttering and pronunciation until 5th grade; +neglect hx (especially in adolescence); painting, drawing;     Social History     Socioeconomic History   • Marital status: Single   Tobacco Use   • Smoking status: Former     Packs/day: 4.00     Years: 2.00     Pack years: 8.00     Types: Cigarettes     Passive exposure: Past   • Smokeless tobacco: Never   Vaping Use   • Vaping Use: Never used   Substance and Sexual Activity   • Alcohol use: Yes     Comment: patient states occ   • Drug use: Yes     Types: Marijuana   • Sexual activity: Yes     Tobacco use counseling/intervention: N/A, patient does not use tobacco; patient has been counseled with regard to risks of tobacco use.    PHQ-9 Depression Screening  PHQ-9 Total Score:      Little interest or pleasure in doing things?     Feeling down, depressed, or hopeless?     Trouble falling or staying asleep, or sleeping too much?     Feeling tired or having little energy?     Poor appetite or overeating?     Feeling bad about yourself - or that you are a failure or have let yourself or your family down?     Trouble concentrating on things,  such as reading the newspaper or watching television?     Moving or speaking so slowly that other people could have noticed? Or the opposite - being so fidgety or restless that you have been moving around a lot more than usual?     Thoughts that you would be better off dead, or of hurting yourself in some way?     PHQ-9 Total Score       Change in PHQ-9 since last measure: -6 (6)    LILIAN-7       Change in LILIAN-7 since last measure: -3 (3)    Problem List:  Patient Active Problem List   Diagnosis   • History of anxiety disorder   • Asthma   • Bipolar II disorder   • ADHD (attention deficit hyperactivity disorder), combined type   • Post traumatic stress disorder (PTSD)   • Marijuana use   • Nicotine dependence, cigarettes, in remission   • Cervical high risk HPV (human papillomavirus) test positive   • Irregular menses     Allergy:   Allergies   Allergen Reactions   • Erythromycin Unknown - Low Severity   • Nitrofurantoin Macrocrystal Hives   • Penicillins Hives        Discontinued Medications:  Medications Discontinued During This Encounter   Medication Reason   • dicyclomine (BENTYL) 20 MG tablet *Therapy completed   • methylphenidate (RITALIN) 10 MG tablet    • lamoTRIgine (LaMICtal) 150 MG tablet Reorder       Current Medications:   Current Outpatient Medications   Medication Sig Dispense Refill   • lamoTRIgine (LaMICtal) 150 MG tablet Take 1 tablet by mouth Daily. 90 tablet 0   • ondansetron ODT (ZOFRAN-ODT) 4 MG disintegrating tablet Place 1 tablet on the tongue 4 (Four) Times a Day As Needed for Nausea or Vomiting. 15 tablet 0   • vitamin D (ERGOCALCIFEROL) 1.25 MG (27182 UT) capsule capsule Take 1 capsule by mouth 1 (One) Time Per Week. 13 capsule 1   • amphetamine-dextroamphetamine (ADDERALL) 10 MG tablet Take 1 tablet by mouth Daily. 30 tablet 0     No current facility-administered medications for this visit.     Past Medical History:  Past Medical History:   Diagnosis Date   • Allergies    • Anxiety    •  Asthma    • Bipolar disorder    • Depression    • Kidney stone    • Panic disorder    • Placental abruption 2014   • Premature labor after 22 weeks and before 37 weeks without delivery 2014   • PTSD (post-traumatic stress disorder)    • Status post  delivery 2014     Past Surgical History:  Past Surgical History:   Procedure Laterality Date   •  SECTION       Mental Status Exam:   Appearance: well-groomed, sits upright, age-appropriate, above average habitus  Behavior: calm, cooperative, appropriate in demeanor, appropriate eye-contact  Mood/affect: euthymic / mood-congruent and appropriate in both range and amplitude  Speech: within expected variance; appropriate rate, appropriate rhythm, appropriate tone; non-pressured  Thought Process: linear, goal-directed; no FOI or JOSE; abstraction intact  Thought Content: coherent, devoid of overt delusions/perceptual disturbances  SI/HI: denies both SI and HI; exhibits future-orientation, self-advocates appropriately, no regular self-harm, no appreciable intent  Memory: no overt deficits  Orientation: oriented to person/place/time/situation  Concentration: appropriate during interview, +subjective deficits  Intellectual capacity: presumptively average  Insight: fair by given history/exam  Judgment: appropriate by given history/exam  Psychomotor: fidgets  Gait: WNL    Review of Systems:  Review of Systems   Constitutional: Negative for activity change, appetite change and unexpected weight change.   HENT: Negative for drooling.    Eyes: Negative for visual disturbance.   Respiratory: Negative for chest tightness and shortness of breath.    Cardiovascular: Negative for chest pain and palpitations.   Gastrointestinal: Positive for abdominal pain and nausea. Negative for diarrhea.   Endocrine: Negative for cold intolerance and heat intolerance.   Genitourinary: Negative for difficulty urinating and frequency.   Musculoskeletal: Negative for myalgias  "and neck stiffness.   Skin: Negative for rash.   Neurological: Negative for dizziness, tremors, seizures and light-headedness.     Vital Signs:   /78   Pulse 79   Ht 162.6 cm (64\")   Wt 92.2 kg (203 lb 3.2 oz)   BMI 34.88 kg/m²      Lab Results:   Admission on 02/24/2023, Discharged on 02/24/2023   Component Date Value Ref Range Status   • Glucose 02/24/2023 114 (H)  65 - 99 mg/dL Final   • BUN 02/24/2023 10  6 - 20 mg/dL Final   • Creatinine 02/24/2023 0.74  0.57 - 1.00 mg/dL Final   • Sodium 02/24/2023 140  136 - 145 mmol/L Final   • Potassium 02/24/2023 3.6  3.5 - 5.2 mmol/L Final   • Chloride 02/24/2023 106  98 - 107 mmol/L Final   • CO2 02/24/2023 24.3  22.0 - 29.0 mmol/L Final   • Calcium 02/24/2023 9.1  8.6 - 10.5 mg/dL Final   • Total Protein 02/24/2023 6.9  6.0 - 8.5 g/dL Final   • Albumin 02/24/2023 4.2  3.5 - 5.2 g/dL Final   • ALT (SGPT) 02/24/2023 14  1 - 33 U/L Final   • AST (SGOT) 02/24/2023 14  1 - 32 U/L Final   • Alkaline Phosphatase 02/24/2023 66  39 - 117 U/L Final   • Total Bilirubin 02/24/2023 0.8  0.0 - 1.2 mg/dL Final   • Globulin 02/24/2023 2.7  gm/dL Final   • A/G Ratio 02/24/2023 1.6  g/dL Final   • BUN/Creatinine Ratio 02/24/2023 13.5  7.0 - 25.0 Final   • Anion Gap 02/24/2023 9.7  5.0 - 15.0 mmol/L Final   • eGFR 02/24/2023 113.9  >60.0 mL/min/1.73 Final   • Lipase 02/24/2023 33  13 - 60 U/L Final   • Color, UA 02/24/2023 Yellow  Yellow, Straw Final   • Appearance, UA 02/24/2023 Clear  Clear Final   • pH, UA 02/24/2023 6.0  5.0 - 8.0 Final   • Specific Gravity, UA 02/24/2023 1.025  1.005 - 1.030 Final   • Glucose, UA 02/24/2023 Negative  Negative Final   • Ketones, UA 02/24/2023 Negative  Negative Final   • Bilirubin, UA 02/24/2023 Negative  Negative Final   • Blood, UA 02/24/2023 Negative  Negative Final   • Protein, UA 02/24/2023 Negative  Negative Final   • Leuk Esterase, UA 02/24/2023 Negative  Negative Final   • Nitrite, UA 02/24/2023 Negative  Negative Final   • " Urobilinogen, UA 02/24/2023 1.0 E.U./dL  0.2 - 1.0 E.U./dL Final   • HCG Quantitative 02/24/2023 <0.50  mIU/mL Final   • Extra Tube 02/24/2023 Hold for add-ons.   Final    Auto resulted.   • Extra Tube 02/24/2023 hold for add-on   Final    Auto resulted   • Extra Tube 02/24/2023 Hold for add-ons.   Final    Auto resulted.   • Extra Tube 02/24/2023 Hold for add-ons.   Final    Auto resulted   • WBC 02/24/2023 6.23  3.40 - 10.80 10*3/mm3 Final   • RBC 02/24/2023 4.85  3.77 - 5.28 10*6/mm3 Final   • Hemoglobin 02/24/2023 14.3  12.0 - 15.9 g/dL Final   • Hematocrit 02/24/2023 40.9  34.0 - 46.6 % Final   • MCV 02/24/2023 84.3  79.0 - 97.0 fL Final   • MCH 02/24/2023 29.5  26.6 - 33.0 pg Final   • MCHC 02/24/2023 35.0  31.5 - 35.7 g/dL Final   • RDW 02/24/2023 12.3  12.3 - 15.4 % Final   • RDW-SD 02/24/2023 36.9 (L)  37.0 - 54.0 fl Final   • MPV 02/24/2023 9.7  6.0 - 12.0 fL Final   • Platelets 02/24/2023 233  140 - 450 10*3/mm3 Final   • Neutrophil % 02/24/2023 57.2  42.7 - 76.0 % Final   • Lymphocyte % 02/24/2023 31.6  19.6 - 45.3 % Final   • Monocyte % 02/24/2023 6.7  5.0 - 12.0 % Final   • Eosinophil % 02/24/2023 3.7  0.3 - 6.2 % Final   • Basophil % 02/24/2023 0.5  0.0 - 1.5 % Final   • Immature Grans % 02/24/2023 0.3  0.0 - 0.5 % Final   • Neutrophils, Absolute 02/24/2023 3.56  1.70 - 7.00 10*3/mm3 Final   • Lymphocytes, Absolute 02/24/2023 1.97  0.70 - 3.10 10*3/mm3 Final   • Monocytes, Absolute 02/24/2023 0.42  0.10 - 0.90 10*3/mm3 Final   • Eosinophils, Absolute 02/24/2023 0.23  0.00 - 0.40 10*3/mm3 Final   • Basophils, Absolute 02/24/2023 0.03  0.00 - 0.20 10*3/mm3 Final   • Immature Grans, Absolute 02/24/2023 0.02  0.00 - 0.05 10*3/mm3 Final   • nRBC 02/24/2023 0.0  0.0 - 0.2 /100 WBC Final   Office Visit on 01/23/2023   Component Date Value Ref Range Status   • HCG Quantitative 01/23/2023 <1.00  mIU/mL Final   • TSH 01/23/2023 1.800  0.270 - 4.200 uIU/mL Final   Lab on 01/23/2023   Component Date Value Ref  Range Status   • HCG Qualitative 01/23/2023 Negative  Negative Final   • Amphet/Methamphet, Screen 01/23/2023 Negative  Negative Final   • Barbiturates Screen, Urine 01/23/2023 Negative  Negative Final   • Benzodiazepine Screen, Urine 01/23/2023 Negative  Negative Final   • Cocaine Screen, Urine 01/23/2023 Negative  Negative Final   • Opiate Screen 01/23/2023 Negative  Negative Final   • THC, Screen, Urine 01/23/2023 Positive (A)  Negative Final   • Methadone Screen, Urine 01/23/2023 Negative  Negative Final   • Oxycodone Screen, Urine 01/23/2023 Negative  Negative Final   Admission on 12/06/2022, Discharged on 12/06/2022   Component Date Value Ref Range Status   • SARS Antigen 12/06/2022 Not Detected   Final   • Internal Control 12/06/2022 Passed   Final   • Lot Number 12/06/2022 [id number]   Final   • Expiration Date 12/06/2022 07/04/2023   Final   • Rapid Influenza A Ag 12/06/2022 Negative   Final   • Rapid Influenza B Ag 12/06/2022 Negative   Final   • Internal Control 12/06/2022 Passed   Final   • Lot Number 12/06/2022 [id number]   Final   • Expiration Date 12/06/2022 03/26/2024   Final   • Rapid Strep A Screen 12/06/2022 Negative   Final   • Internal Control 12/06/2022 Passed   Final   • Lot Number 12/06/2022 [id number]   Final   • Expiration Date 12/06/2022 02/28/2024   Final   Lab on 11/22/2022   Component Date Value Ref Range Status   • Vitamin B-12 11/22/2022 675  211 - 946 pg/mL Final   • Total Cholesterol 11/22/2022 165  0 - 200 mg/dL Final   • Triglycerides 11/22/2022 111  0 - 150 mg/dL Final   • HDL Cholesterol 11/22/2022 56  40 - 60 mg/dL Final   • LDL Cholesterol  11/22/2022 89  0 - 100 mg/dL Final   • VLDL Cholesterol 11/22/2022 20  5 - 40 mg/dL Final   • LDL/HDL Ratio 11/22/2022 1.55   Final   • TSH 11/22/2022 1.840  0.270 - 4.200 uIU/mL Final   • 25 Hydroxy, Vitamin D 11/22/2022 14.8 (L)  30.0 - 100.0 ng/ml Final   • Glucose 11/22/2022 90  65 - 99 mg/dL Final   • BUN 11/22/2022 12  6 - 20 mg/dL Final   •  Creatinine 11/22/2022 0.74  0.57 - 1.00 mg/dL Final   • Sodium 11/22/2022 140  136 - 145 mmol/L Final   • Potassium 11/22/2022 4.2  3.5 - 5.2 mmol/L Final   • Chloride 11/22/2022 108 (H)  98 - 107 mmol/L Final   • CO2 11/22/2022 24.0  22.0 - 29.0 mmol/L Final   • Calcium 11/22/2022 9.2  8.6 - 10.5 mg/dL Final   • Total Protein 11/22/2022 7.1  6.0 - 8.5 g/dL Final   • Albumin 11/22/2022 4.10  3.50 - 5.20 g/dL Final   • ALT (SGPT) 11/22/2022 27  1 - 33 U/L Final   • AST (SGOT) 11/22/2022 22  1 - 32 U/L Final   • Alkaline Phosphatase 11/22/2022 62  39 - 117 U/L Final   • Total Bilirubin 11/22/2022 1.4 (H)  0.0 - 1.2 mg/dL Final   • Globulin 11/22/2022 3.0  gm/dL Final   • A/G Ratio 11/22/2022 1.4  g/dL Final   • BUN/Creatinine Ratio 11/22/2022 16.2  7.0 - 25.0 Final   • Anion Gap 11/22/2022 8.0  5.0 - 15.0 mmol/L Final   • eGFR 11/22/2022 114.6  >60.0 mL/min/1.73 Final    National Kidney Foundation and American Society of Nephrology (ASN) Task Force recommended calculation based on the Chronic Kidney Disease Epidemiology Collaboration (CKD-EPI) equation refit without adjustment for race.   • WBC 11/22/2022 5.73  3.40 - 10.80 10*3/mm3 Final   • RBC 11/22/2022 5.15  3.77 - 5.28 10*6/mm3 Final   • Hemoglobin 11/22/2022 15.4  12.0 - 15.9 g/dL Final   • Hematocrit 11/22/2022 45.6  34.0 - 46.6 % Final   • MCV 11/22/2022 88.5  79.0 - 97.0 fL Final   • MCH 11/22/2022 29.9  26.6 - 33.0 pg Final   • MCHC 11/22/2022 33.8  31.5 - 35.7 g/dL Final   • RDW 11/22/2022 12.1 (L)  12.3 - 15.4 % Final   • RDW-SD 11/22/2022 39.4  37.0 - 54.0 fl Final   • MPV 11/22/2022 10.5  6.0 - 12.0 fL Final   • Platelets 11/22/2022 250  140 - 450 10*3/mm3 Final   • Neutrophil % 11/22/2022 60.4  42.7 - 76.0 % Final   • Lymphocyte % 11/22/2022 27.9  19.6 - 45.3 % Final   • Monocyte % 11/22/2022 7.5  5.0 - 12.0 % Final   • Eosinophil % 11/22/2022 3.3  0.3 - 6.2 % Final   • Basophil % 11/22/2022 0.7  0.0 - 1.5 % Final   • Immature Grans % 11/22/2022 0.2   0.0 - 0.5 % Final   • Neutrophils, Absolute 11/22/2022 3.46  1.70 - 7.00 10*3/mm3 Final   • Lymphocytes, Absolute 11/22/2022 1.60  0.70 - 3.10 10*3/mm3 Final   • Monocytes, Absolute 11/22/2022 0.43  0.10 - 0.90 10*3/mm3 Final   • Eosinophils, Absolute 11/22/2022 0.19  0.00 - 0.40 10*3/mm3 Final   • Basophils, Absolute 11/22/2022 0.04  0.00 - 0.20 10*3/mm3 Final   • Immature Grans, Absolute 11/22/2022 0.01  0.00 - 0.05 10*3/mm3 Final   • nRBC 11/22/2022 0.0  0.0 - 0.2 /100 WBC Final   Office Visit on 10/28/2022   Component Date Value Ref Range Status   • Color 10/28/2022 Yellow  Yellow, Straw, Dark Yellow, Candi Final   • Clarity, UA 10/28/2022 Clear  Clear Final   • Specific Gravity  10/28/2022 1.025  1.005 - 1.030 Final   • pH, Urine 10/28/2022 7.5  5.0 - 8.0 Final   • Leukocytes 10/28/2022 Negative  Negative Final   • Nitrite, UA 10/28/2022 Negative  Negative Final   • Protein, POC 10/28/2022 Negative  Negative mg/dL Final   • Glucose, UA 10/28/2022 Negative  Negative mg/dL Final   • Ketones, UA 10/28/2022 Negative  Negative Final   • Urobilinogen, UA 10/28/2022 Normal  Normal, 0.2 E.U./dL Final   • Bilirubin 10/28/2022 Negative  Negative Final   • Blood, UA 10/28/2022 Negative  Negative Final   • Lot Number 10/28/2022 [id number]   Final   • Expiration Date 10/28/2022 [id number]   Final   • Diagnosis 10/28/2022 Comment   Final    NEGATIVE FOR INTRAEPITHELIAL LESION OR MALIGNANCY.  CELLULAR CHANGES ASSOCIATED WITH INFLAMMATION ARE PRESENT.   • Specimen adequacy: 10/28/2022 Comment   Final    Satisfactory for evaluation.  Endocervical and/or squamous metaplastic  cells (endocervical component) are present.   • Clinician Provided ICD-10: 10/28/2022 Comment   Final    Z12.4   • Performed by: 10/28/2022 Comment   Final    Sierra Jensen, Cytotechnologist   • . 10/28/2022 .   Final   • Note: 10/28/2022 Comment   Final    The Pap smear is a screening test designed to aid in the detection of  premalignant and malignant  conditions of the uterine cervix.  It is not a  diagnostic procedure and should not be used as the sole means of detecting  cervical cancer.  Both false-positive and false-negative reports do occur.   • Method: 10/28/2022 Comment   Final    This liquid based ThinPrep(R) pap test was screened with the  use of an image guided system.   • HPV Aptima 10/28/2022 Positive (A)  Negative Final    This nucleic acid amplification test detects fourteen high-risk  HPV types (16,18,31,33,35,39,45,51,52,56,58,59,66,68) without  differentiation.   • HCG, Urine, QL 10/28/2022 Negative  Negative Final   • Lot Number 10/28/2022 [id number]   Final   • Internal Positive Control 10/28/2022 Passed  Positive, Passed Final   • Internal Negative Control 10/28/2022 Passed  Negative, Passed Final   • Expiration Date 10/28/[id number]   Final     EKG Results:  No orders to display     Imaging Results:  CT Abdomen Pelvis With Contrast  Result Date: 11/22/2022    1. No acute findings are seen.  Trace fluid in the distal esophagus could suggest reflux.  Previously seen thickening of distal ileum and terminal ileum is not apparent on today's exam.  There is no evidence for appendicitis.     DANNY KELSEY MD      ASSESSMENT AND PLAN:    ICD-10-CM ICD-9-CM   1. Bipolar II disorder  F31.81 296.89   2. ADHD (attention deficit hyperactivity disorder), combined type  F90.2 314.01   3. Post traumatic stress disorder (PTSD)  F43.10 309.81   4. History of anxiety disorder  Z86.59 V11.8   5. Marijuana use  F12.90 305.20     28 y.o. female who presents today for follow-up regarding bipolar II, ADHD, PTSD, anxiety in the context of marijuana use. We have discussed the interval history and the treatment plan below, including potential R/B/SE of the recommended regimen of which the patient demonstrates understanding. Patient is agreeable to call 911 or go to the nearest ER should she become concerned for her own safety and/or the safety of those around  her. There are no medication side effects or related complaints today. There are no overt indices of acute celia/psychosis on exam today.    1. Medication regimen: begin amphetamine 10 mg QD, discontinue methylphenidate, continue lamotrigine; patient is advised not to misuse prescribed medications or to use them with any exogenous substances that aren't disclosed to this provider as they may interact with the regimen to the patient's detriment.   2. Risk Assessment: protracted risk is moderate, imminent risk is low - no interval change. Do note that this is subject to change with the [employer] of new stressors, treatment non-adherence, use of substances, and/or new medical ails.   3. Monitoring: reviewed labs/imaging as populated above  4. Therapy: yet to reach out, will consider online options given time constraints  5. Follow-up: 6 weeks  6. Communications: N/A    TREATMENT PLAN/GOALS: challenge patterns of living conducive to symptom burden, change recommended regimen as above with augmentative, intermittent supportive psychotherapy to reduce symptom burden. Patient acknowledged and verbally consented to continue treatment. The importance of adherence to the recommended treatment and interval follow-up appointments was again emphasized today: patient has good treatment adherence per given history. Patient was today reminded to limit daily caffeine intake, hydrate appropriately, eat healthy and nutritious foods, engage sleep hygiene measures, engage appropriate exposure to sunlight, engage with hobbies in balance with life necessities, and exercise appropriate to their capacity to do so.     Billing: This encounter is of moderate complexity based on number/complexity of problems addressed today and risk of complications/morbidity: 2+ stable chronic illnesses and prescription management.     Parts of this note are electronic transcriptions/translations of spoken language to printed text using the Dragon Dictation  system.    Electronically signed by Solo Hennessy MD, 04/05/23, [id number]

## 2023-04-07 ENCOUNTER — PRIOR AUTHORIZATION (OUTPATIENT)
Dept: PSYCHIATRY | Facility: CLINIC | Age: 28
End: 2023-04-07
Payer: COMMERCIAL

## 2023-04-07 NOTE — TELEPHONE ENCOUNTER
PRIOR AUTHORIZATION HAS BEEN APPROVED    Key: [id number] - [place] Case ID: [id number]     [facility] TO NOTIFY

## 2023-04-10 NOTE — TELEPHONE ENCOUNTER
[place] Approval for Dextropamp-amphetamin 10mg tab, [employer], approval from  4/7/2023 - 4/6/2024    BEHAVIORAL HEALTH - SCAN - [place] Approval for Dextropamp-amphetamin 10mg tab, [employer], 4/7/2023 (04/10/2023)

## 2023-05-11 ENCOUNTER — TELEPHONE (OUTPATIENT)
Dept: PSYCHIATRY | Facility: CLINIC | Age: 28
End: 2023-05-11
Payer: COMMERCIAL

## 2023-05-11 ENCOUNTER — TELEPHONE (OUTPATIENT)
Dept: OBSTETRICS AND GYNECOLOGY | Facility: CLINIC | Age: 28
End: 2023-05-11
Payer: COMMERCIAL

## 2023-05-11 DIAGNOSIS — O36.80X0 PREGNANCY WITH INCONCLUSIVE FETAL VIABILITY, SINGLE OR UNSPECIFIED FETUS: Primary | ICD-10-CM

## 2023-05-11 NOTE — TELEPHONE ENCOUNTER
[facility]. SEVERAL ATTEMPTS MADE TO FOLLOW UP ON REFERRAL INCLUDING MAILING A CONTACT LETTER. CLOSING OUT REFERRAL

## 2023-05-11 NOTE — TELEPHONE ENCOUNTER
Caller: Shruti Pearl    Relationship: Self    Best call back number: [phone]    What orders are you requesting (i.e. lab or imaging): U/S AND LABS    In what timeframe would the patient need to come in: ASAP    Where will you receive your lab/imaging services: [facility]     Additional notes: PT SCHEDULED NEW OB APPT ON 7/27/23 WHICH IS FIRST AVAILABLE AND WOULD LIKE TO GET AN U/S AND LABS DONE BEFORE HER APPT IF POSSIBLE,  SHE HAS HAD AN ECTOPIC PREGNANCY BEFORE SO IT RAISES A CONCERN PLEASE CALL PT TO ADVISE

## 2023-05-15 ENCOUNTER — TELEPHONE (OUTPATIENT)
Dept: OBSTETRICS AND GYNECOLOGY | Facility: CLINIC | Age: 28
End: 2023-05-15
Payer: COMMERCIAL

## 2023-05-15 ENCOUNTER — LAB (OUTPATIENT)
Dept: LAB | Facility: HOSPITAL | Age: 28
End: 2023-05-15
Payer: COMMERCIAL

## 2023-05-15 LAB — HCG INTACT+B SERPL-ACNC: NORMAL MIU/ML

## 2023-05-15 PROCEDURE — 84702 CHORIONIC GONADOTROPIN TEST: CPT | Performed by: NURSE PRACTITIONER

## 2023-05-15 NOTE — TELEPHONE ENCOUNTER
Left message for patient. Mehrdad has placed an order for a BHCG. We needs to make sure she is aware this has been placed. Pt should get this done asap due to her having a history of ectopic. If pt calls back please let me speak with her if I am available, otherwise give above info

## 2023-05-16 ENCOUNTER — TELEPHONE (OUTPATIENT)
Dept: OBSTETRICS AND GYNECOLOGY | Facility: CLINIC | Age: 28
End: 2023-05-16
Payer: COMMERCIAL

## 2023-05-16 NOTE — TELEPHONE ENCOUNTER
I have attempted to call the patient.  I had to leave a voicemail message asking her to call the office.

## 2023-05-16 NOTE — TELEPHONE ENCOUNTER
The patient called back and she had already spoken with Sujey early this morning about this result and that she will be sure to keep her ultrasound appointment.

## 2023-05-16 NOTE — TELEPHONE ENCOUNTER
----- Message from Candi Diaz sent at 5/16/2023  8:49 AM EDT -----  Please call patient her blood hcg is [phone] as soon as patient gets ultrasound done we will go over results and go from there.  ----- Message -----  From: Lab, Background User  Sent: 5/15/2023  10:43 PM EDT  To: DILSHAD Lewis

## 2023-05-17 ENCOUNTER — OFFICE VISIT (OUTPATIENT)
Dept: PSYCHIATRY | Facility: CLINIC | Age: 28
End: 2023-05-17
Payer: COMMERCIAL

## 2023-05-17 VITALS
HEIGHT: 64 IN | WEIGHT: 207 LBS | DIASTOLIC BLOOD PRESSURE: 69 MMHG | HEART RATE: 72 BPM | BODY MASS INDEX: 35.34 KG/M2 | SYSTOLIC BLOOD PRESSURE: 114 MMHG

## 2023-05-17 DIAGNOSIS — F43.10 POST TRAUMATIC STRESS DISORDER (PTSD): ICD-10-CM

## 2023-05-17 DIAGNOSIS — F90.2 ADHD (ATTENTION DEFICIT HYPERACTIVITY DISORDER), COMBINED TYPE: ICD-10-CM

## 2023-05-17 DIAGNOSIS — F31.81 BIPOLAR II DISORDER: Primary | ICD-10-CM

## 2023-05-17 DIAGNOSIS — Z86.59 HISTORY OF ANXIETY DISORDER: ICD-10-CM

## 2023-05-17 RX ORDER — LAMOTRIGINE 100 MG/1
TABLET ORAL
Qty: 14 TABLET | Refills: 0 | Status: SHIPPED | OUTPATIENT
Start: 2023-05-17

## 2023-05-17 NOTE — PROGRESS NOTES
"[facility]  Follow-up Visit    Chief Complaint: \"I talked to my doctor about it... an ADHD diagnosis or tested for it.\"    History of Present Illness: Shruti Pearl is a 28 y.o. female who presents today for follow-up regarding bipolar II, ADHD, PTSD, anxiety in the context of marijuana use. Last seen: 04/05 at which time amphetamine replaced methylphenidate; this was never picked up due to shortages. She presents unaccompanied in no acute distress and engages with me appropriately. Psychotropic regimen perceived to be effective. Side-effects per given history: denies.    Current treatment regimen includes:   - lamotrigine 150 mg QD  - amphetamine 10 mg QD (never picked up due to shortages)    Today the patient feels she's doing fairly well. Patient has discovered she's pregnant and is happy about this. We discussed general R/B of medication use vs going without medications for her bipolar illness; she'd prefer to defer use of medication at this time to see if she can manage during the pregnancy without. She demonstrates understanding of potential R/B. We discussed risk of seizure threshold being diminished with abrupt cessation of lamotrigine, will look to taper over the next two weeks to reduce this risk especially given the patient is pregnant. Thought process and content are devoid of overt aberration suggestive of acute celia/psychosis. The patient denies SI/HI/AVH. There are no overt changes on exam today compared to most recent evaluation.  - contextual changes:  +pregnancy, stopped smoking marijuana  - sleep: no changes  - appetite: no change    I have counseled the patient with regard to diagnoses and the recommended treatment regimen as documented below. I have specifically reviewed issues related to misuse and diversion with the patient today. Patient acknowledges the diagnoses per my rendered interpretation. Patient demonstrates understanding of potential " risks/benefits/side effects associated with this regimen and is amenable to proceed in this fashion.     Psychotherapy  - Time: 13 minutes  - interventions employed: the therapeutic alliance was strengthened to encourage the patient to express their thoughts and feelings freely. Esteem building was enhanced through praise, reassurance, normalizing/challenging, and encouragement as appropriate. Coping skills were enhanced to build distress tolerance skills and emotional regulation. Allowed patient to freely discuss issues without interruption or judgement with unconditional positive regard, active listening skills, and empathy. Provided a safe, confidential environment to facilitate the development of a positive therapeutic relationship and encourage open, honest communication. Assisted patient in processing session content; acknowledged and normalized/addressed, as appropriate, patient’s thoughts, feelings, and concerns by utilizing a person-centered approach in efforts to build appropriate rapport and a positive therapeutic relationship with open and honest communication.   - Diagnoses: see assessment and plan below  - Symptoms: see subjective above  - Goals   - patient: improve executive function, maintain mood stability, maintain effective anxiety management   - provider: challenge patterns of living conducive to pathology, strengthen defenses, promote problems solving, restore adaptive functioning and provide symptom relief.  - Treatment plan: continue supportive psychotherapy in subsequent appointments to provide symptom relief; see assessment and plan below for additional details:   - iteration: 1   - progress: good   - (X)illumination, (X)contextualization, (X)detection, (working)development, (-)elaboration, (-)refinement  - functional status: good  - mental status exam: as below  - prognosis: good    Psychiatric History:  - Pertinent interval changes: N/A  - Psychotropic medication trials: buspirone (poor  adherence), escitalopram (diminished effectiveness), lamotrigine, trazodone, aripiprazole, methylphenidate (persistent and intolerable nausea, headache at 10 mg BID)  - Key synopsis: no SA hx; + cutting as recent as 2018    Substance Abuse History:   - Pertinent interval changes: N/A  - Key synopsis: marijuana nightly / usually smokes, but does use edibles episodically; abused alcohol in the past    Social History:  - Pertinent interval changes: denies  - Key synopsis: lives in a house with her 7 YO son, Nick, and the patient's sister; works [employer]; some college; had speech therapy for stuttering and pronunciation until 5th grade; +neglect hx (especially in adolescence); painting, drawing;     Social History     Socioeconomic History   • Marital status: Single   Tobacco Use   • Smoking status: Former     Packs/day: 4.00     Years: 2.00     Pack years: 8.00     Types: Cigarettes     Passive exposure: Past   • Smokeless tobacco: Never   Vaping Use   • Vaping Use: Never used   Substance and Sexual Activity   • Alcohol use: Not Currently   • Drug use: Not Currently     Types: Marijuana   • Sexual activity: Yes     Tobacco use counseling/intervention: N/A, patient does not use tobacco; patient has been counseled with regard to risks of tobacco use.    PHQ-9 Depression Screening  PHQ-9 Total Score:      Little interest or pleasure in doing things?     Feeling down, depressed, or hopeless?     Trouble falling or staying asleep, or sleeping too much?     Feeling tired or having little energy?     Poor appetite or overeating?     Feeling bad about yourself - or that you are a failure or have let yourself or your family down?     Trouble concentrating on things, such as reading the newspaper or watching television?     Moving or speaking so slowly that other people could have noticed? Or the opposite - being so fidgety or restless that you have been moving around a lot more than usual?     Thoughts that you  would be better off dead, or of hurting yourself in some way?     PHQ-9 Total Score       Change in PHQ-9 since last measure: N/A (0)    LILIAN-7       Change in LILIAN-7 since last measure: N/A (0)    Problem List:  Patient Active Problem List   Diagnosis   • History of anxiety disorder   • Asthma   • Bipolar II disorder   • ADHD (attention deficit hyperactivity disorder), combined type   • Post traumatic stress disorder (PTSD)   • Marijuana use   • Nicotine dependence, cigarettes, in remission   • Cervical high risk HPV (human papillomavirus) test positive   • Irregular menses     Allergy:   Allergies   Allergen Reactions   • Erythromycin Unknown - Low Severity   • Nitrofurantoin Macrocrystal Hives   • Penicillins Hives        Discontinued Medications:  Medications Discontinued During This Encounter   Medication Reason   • lamoTRIgine (LaMICtal) 150 MG tablet    • amphetamine-dextroamphetamine (ADDERALL) 10 MG tablet        Current Medications:   Current Outpatient Medications   Medication Sig Dispense Refill   • ondansetron ODT (ZOFRAN-ODT) 4 MG disintegrating tablet Place 1 tablet on the tongue 4 (Four) Times a Day As Needed for Nausea or Vomiting. 15 tablet 0   • vitamin D (ERGOCALCIFEROL) 1.25 MG (87904 UT) capsule capsule Take 1 capsule by mouth 1 (One) Time Per Week. 13 capsule 1   • lamoTRIgine (LaMICtal) 100 MG tablet Take one tablet by mouth nightly for 1 week, then take a half tablet by mouth nightly for another week before discontinuing this medication. 14 tablet 0     No current facility-administered medications for this visit.     Past Medical History:  Past Medical History:   Diagnosis Date   • Allergies    • Anxiety    • Asthma    • Bipolar disorder    • Depression    • Kidney stone    • Panic disorder    • Placental abruption 2014   • Premature labor after 22 weeks and before 37 weeks without delivery 2014   • PTSD (post-traumatic stress disorder)    • Status post  delivery 2014  "    Past Surgical History:  Past Surgical History:   Procedure Laterality Date   •  SECTION       Mental Status Exam:   Appearance: well-groomed, sits upright, age-appropriate, above average habitus  Behavior: calm, cooperative, appropriate in demeanor, appropriate eye-contact  Mood/affect: euthymic / mood-congruent and appropriate in both range and amplitude  Speech: within expected variance; appropriate rate, appropriate rhythm, appropriate tone; non-pressured  Thought Process: linear, goal-directed; no FOI or JOSE; abstraction intact  Thought Content: coherent, devoid of overt delusions/perceptual disturbances  SI/HI: denies both SI and HI; exhibits future-orientation, self-advocates appropriately, no regular self-harm, no appreciable intent  Memory: no overt deficits  Orientation: oriented to person/place/time/situation  Concentration: appropriate during interview, +subjective deficits  Intellectual capacity: presumptively average  Insight: fair by given history/exam  Judgment: appropriate by given history/exam  Psychomotor: fidgets  Gait: WNL    Review of Systems:  Review of Systems   Constitutional: Negative for activity change, appetite change and unexpected weight change.   HENT: Negative for drooling.    Eyes: Negative for visual disturbance.   Respiratory: Negative for chest tightness and shortness of breath.    Cardiovascular: Negative for chest pain and palpitations.   Gastrointestinal: Negative for abdominal pain, diarrhea and nausea.   Endocrine: Negative for cold intolerance and heat intolerance.   Genitourinary: Negative for difficulty urinating and frequency.   Musculoskeletal: Negative for myalgias and neck stiffness.   Skin: Negative for rash.   Neurological: Negative for dizziness, tremors, seizures and light-headedness.     Vital Signs:   /69   Pulse 72   Ht 162.6 cm (64\")   Wt 93.9 kg (207 lb)   BMI 35.53 kg/m²    Lab Results:   Telephone on 2023   Component Date Value " Ref Range Status   • HCG Quantitative 05/15/2023 71,396.00  mIU/mL Final   Admission on 02/24/2023, Discharged on 02/24/2023   Component Date Value Ref Range Status   • Glucose 02/24/2023 114 (H)  65 - 99 mg/dL Final   • BUN 02/24/2023 10  6 - 20 mg/dL Final   • Creatinine 02/24/2023 0.74  0.57 - 1.00 mg/dL Final   • Sodium 02/24/2023 140  136 - 145 mmol/L Final   • Potassium 02/24/2023 3.6  3.5 - 5.2 mmol/L Final   • Chloride 02/24/2023 106  98 - 107 mmol/L Final   • CO2 02/24/2023 24.3  22.0 - 29.0 mmol/L Final   • Calcium 02/24/2023 9.1  8.6 - 10.5 mg/dL Final   • Total Protein 02/24/2023 6.9  6.0 - 8.5 g/dL Final   • Albumin 02/24/2023 4.2  3.5 - 5.2 g/dL Final   • ALT (SGPT) 02/24/2023 14  1 - 33 U/L Final   • AST (SGOT) 02/24/2023 14  1 - 32 U/L Final   • Alkaline Phosphatase 02/24/2023 66  39 - 117 U/L Final   • Total Bilirubin 02/24/2023 0.8  0.0 - 1.2 mg/dL Final   • Globulin 02/24/2023 2.7  gm/dL Final   • A/G Ratio 02/24/2023 1.6  g/dL Final   • BUN/Creatinine Ratio 02/24/2023 13.5  7.0 - 25.0 Final   • Anion Gap 02/24/2023 9.7  5.0 - 15.0 mmol/L Final   • eGFR 02/24/2023 113.9  >60.0 mL/min/1.73 Final   • Lipase 02/24/2023 33  13 - 60 U/L Final   • Color, UA 02/24/2023 Yellow  Yellow, Straw Final   • Appearance, UA 02/24/2023 Clear  Clear Final   • pH, UA 02/24/2023 6.0  5.0 - 8.0 Final   • Specific Gravity, UA 02/24/2023 1.025  1.005 - 1.030 Final   • Glucose, UA 02/24/2023 Negative  Negative Final   • Ketones, UA 02/24/2023 Negative  Negative Final   • Bilirubin, UA 02/24/2023 Negative  Negative Final   • Blood, UA 02/24/2023 Negative  Negative Final   • Protein, UA 02/24/2023 Negative  Negative Final   • Leuk Esterase, UA 02/24/2023 Negative  Negative Final   • Nitrite, UA 02/24/2023 Negative  Negative Final   • Urobilinogen, UA 02/24/2023 1.0 E.U./dL  0.2 - 1.0 E.U./dL Final   • HCG Quantitative 02/24/2023 <0.50  mIU/mL Final   • Extra Tube 02/24/2023 Hold for add-ons.   Final    Auto resulted.   •  Extra Tube 02/24/2023 hold for add-on   Final    Auto resulted   • Extra Tube 02/24/2023 Hold for add-ons.   Final    Auto resulted.   • Extra Tube 02/24/2023 Hold for add-ons.   Final    Auto resulted   • WBC 02/24/2023 6.23  3.40 - 10.80 10*3/mm3 Final   • RBC 02/24/2023 4.85  3.77 - 5.28 10*6/mm3 Final   • Hemoglobin 02/24/2023 14.3  12.0 - 15.9 g/dL Final   • Hematocrit 02/24/2023 40.9  34.0 - 46.6 % Final   • MCV 02/24/2023 84.3  79.0 - 97.0 fL Final   • MCH 02/24/2023 29.5  26.6 - 33.0 pg Final   • MCHC 02/24/2023 35.0  31.5 - 35.7 g/dL Final   • RDW 02/24/2023 12.3  12.3 - 15.4 % Final   • RDW-SD 02/24/2023 36.9 (L)  37.0 - 54.0 fl Final   • MPV 02/24/2023 9.7  6.0 - 12.0 fL Final   • Platelets 02/24/2023 233  140 - 450 10*3/mm3 Final   • Neutrophil % 02/24/2023 57.2  42.7 - 76.0 % Final   • Lymphocyte % 02/24/2023 31.6  19.6 - 45.3 % Final   • Monocyte % 02/24/2023 6.7  5.0 - 12.0 % Final   • Eosinophil % 02/24/2023 3.7  0.3 - 6.2 % Final   • Basophil % 02/24/2023 0.5  0.0 - 1.5 % Final   • Immature Grans % 02/24/2023 0.3  0.0 - 0.5 % Final   • Neutrophils, Absolute 02/24/2023 3.56  1.70 - 7.00 10*3/mm3 Final   • Lymphocytes, Absolute 02/24/2023 1.97  0.70 - 3.10 10*3/mm3 Final   • Monocytes, Absolute 02/24/2023 0.42  0.10 - 0.90 10*3/mm3 Final   • Eosinophils, Absolute 02/24/2023 0.23  0.00 - 0.40 10*3/mm3 Final   • Basophils, Absolute 02/24/2023 0.03  0.00 - 0.20 10*3/mm3 Final   • Immature Grans, Absolute 02/24/2023 0.02  0.00 - 0.05 10*3/mm3 Final   • nRBC 02/24/2023 0.0  0.0 - 0.2 /100 WBC Final   Office Visit on 01/23/2023   Component Date Value Ref Range Status   • HCG Quantitative 01/23/2023 <1.00  mIU/mL Final   • TSH 01/23/2023 1.800  0.270 - 4.200 uIU/mL Final   Lab on 01/23/2023   Component Date Value Ref Range Status   • HCG Qualitative 01/23/2023 Negative  Negative Final   • Amphet/Methamphet, Screen 01/23/2023 Negative  Negative Final   • Barbiturates Screen, Urine 01/23/2023 Negative   Negative Final   • Benzodiazepine Screen, Urine 01/23/2023 Negative  Negative Final   • Cocaine Screen, Urine 01/23/2023 Negative  Negative Final   • Opiate Screen 01/23/2023 Negative  Negative Final   • THC, Screen, Urine 01/23/2023 Positive (A)  Negative Final   • Methadone Screen, Urine 01/23/2023 Negative  Negative Final   • Oxycodone Screen, Urine 01/23/2023 Negative  Negative Final   Admission on 12/06/2022, Discharged on 12/06/2022   Component Date Value Ref Range Status   • SARS Antigen 12/06/2022 Not Detected   Final   • Internal Control 12/06/2022 Passed   Final   • Lot Number 12/06/2022 [id number]   Final   • Expiration Date 12/06/2022 07/04/2023   Final   • Rapid Influenza A Ag 12/06/2022 Negative   Final   • Rapid Influenza B Ag 12/06/2022 Negative   Final   • Internal Control 12/06/2022 Passed   Final   • Lot Number 12/06/2022 [id number]   Final   • Expiration Date 12/06/2022 03/26/2024   Final   • Rapid Strep A Screen 12/06/2022 Negative   Final   • Internal Control 12/06/2022 Passed   Final   • Lot Number 12/06/2022 [id number]   Final   • Expiration Date 12/06/2022 02/28/2024   Final   Lab on 11/22/2022   Component Date Value Ref Range Status   • Vitamin B-12 11/22/2022 675  211 - 946 pg/mL Final   • Total Cholesterol 11/22/2022 165  0 - 200 mg/dL Final   • Triglycerides 11/22/2022 111  0 - 150 mg/dL Final   • HDL Cholesterol 11/22/2022 56  40 - 60 mg/dL Final   • LDL Cholesterol  11/22/2022 89  0 - 100 mg/dL Final   • VLDL Cholesterol 11/22/2022 20  5 - 40 mg/dL Final   • LDL/HDL Ratio 11/22/2022 1.55   Final   • TSH 11/22/2022 1.840  0.270 - 4.200 uIU/mL Final   • 25 Hydroxy, Vitamin D 11/22/2022 14.8 (L)  30.0 - 100.0 ng/ml Final   • Glucose 11/22/2022 90  65 - 99 mg/dL Final   • BUN 11/22/2022 12  6 - 20 mg/dL Final   • Creatinine 11/22/2022 0.74  0.57 - 1.00 mg/dL Final   • Sodium 11/22/2022 140  136 - 145 mmol/L Final   • Potassium 11/22/2022 4.2  3.5 - 5.2 mmol/L Final   • Chloride 11/22/2022 108 (H)   98 - 107 mmol/L Final   • CO2 11/22/2022 24.0  22.0 - 29.0 mmol/L Final   • Calcium 11/22/2022 9.2  8.6 - 10.5 mg/dL Final   • Total Protein 11/22/2022 7.1  6.0 - 8.5 g/dL Final   • Albumin 11/22/2022 4.10  3.50 - 5.20 g/dL Final   • ALT (SGPT) 11/22/2022 27  1 - 33 U/L Final   • AST (SGOT) 11/22/2022 22  1 - 32 U/L Final   • Alkaline Phosphatase 11/22/2022 62  39 - 117 U/L Final   • Total Bilirubin 11/22/2022 1.4 (H)  0.0 - 1.2 mg/dL Final   • Globulin 11/22/2022 3.0  gm/dL Final   • A/G Ratio 11/22/2022 1.4  g/dL Final   • BUN/Creatinine Ratio 11/22/2022 16.2  7.0 - 25.0 Final   • Anion Gap 11/22/2022 8.0  5.0 - 15.0 mmol/L Final   • eGFR 11/22/2022 114.6  >60.0 mL/min/1.73 Final    National Kidney Foundation and American Society of Nephrology (ASN) Task Force recommended calculation based on the Chronic Kidney Disease Epidemiology Collaboration (CKD-EPI) equation refit without adjustment for race.   • WBC 11/22/2022 5.73  3.40 - 10.80 10*3/mm3 Final   • RBC 11/22/2022 5.15  3.77 - 5.28 10*6/mm3 Final   • Hemoglobin 11/22/2022 15.4  12.0 - 15.9 g/dL Final   • Hematocrit 11/22/2022 45.6  34.0 - 46.6 % Final   • MCV 11/22/2022 88.5  79.0 - 97.0 fL Final   • MCH 11/22/2022 29.9  26.6 - 33.0 pg Final   • MCHC 11/22/2022 33.8  31.5 - 35.7 g/dL Final   • RDW 11/22/2022 12.1 (L)  12.3 - 15.4 % Final   • RDW-SD 11/22/2022 39.4  37.0 - 54.0 fl Final   • MPV 11/22/2022 10.5  6.0 - 12.0 fL Final   • Platelets 11/22/2022 250  140 - 450 10*3/mm3 Final   • Neutrophil % 11/22/2022 60.4  42.7 - 76.0 % Final   • Lymphocyte % 11/22/2022 27.9  19.6 - 45.3 % Final   • Monocyte % 11/22/2022 7.5  5.0 - 12.0 % Final   • Eosinophil % 11/22/2022 3.3  0.3 - 6.2 % Final   • Basophil % 11/22/2022 0.7  0.0 - 1.5 % Final   • Immature Grans % 11/22/2022 0.2  0.0 - 0.5 % Final   • Neutrophils, Absolute 11/22/2022 3.46  1.70 - 7.00 10*3/mm3 Final   • Lymphocytes, Absolute 11/22/2022 1.60  0.70 - 3.10 10*3/mm3 Final   • Monocytes, Absolute  11/22/2022 0.43  0.10 - 0.90 10*3/mm3 Final   • Eosinophils, Absolute 11/22/2022 0.19  0.00 - 0.40 10*3/mm3 Final   • Basophils, Absolute 11/22/2022 0.04  0.00 - 0.20 10*3/mm3 Final   • Immature Grans, Absolute 11/22/2022 0.01  0.00 - 0.05 10*3/mm3 Final   • nRBC 11/22/2022 0.0  0.0 - 0.2 /100 WBC Final     EKG Results:  No orders to display     Imaging Results:  CT Abdomen Pelvis With Contrast  Result Date: 11/22/2022    1. No acute findings are seen.  Trace fluid in the distal esophagus could suggest reflux.  Previously seen thickening of distal ileum and terminal ileum is not apparent on today's exam.  There is no evidence for appendicitis.     DANNY KELSEY MD      ASSESSMENT AND PLAN:    ICD-10-CM ICD-9-CM   1. Bipolar II disorder  F31.81 296.89   2. History of anxiety disorder  Z86.59 V11.8   3. ADHD (attention deficit hyperactivity disorder), combined type  F90.2 314.01   4. Post traumatic stress disorder (PTSD)  F43.10 309.81     28 y.o. female who presents today for follow-up regarding bipolar II, ADHD, PTSD, anxiety in the context of marijuana use. We have discussed the interval history and the treatment plan below, including potential R/B/SE of the recommended regimen of which the patient demonstrates understanding. Patient is agreeable to call 911 or go to the nearest ER should she become concerned for her own safety and/or the safety of those around her. There are no medication side effects or related complaints today. There are no overt indices of acute celia/psychosis on exam today.    1. Medication regimen: discontinue amphetamine, taper lamotrigine to discontinuation over the next two weeks; patient is advised not to misuse prescribed medications or to use them with any exogenous substances that aren't disclosed to this provider as they may interact with the regimen to the patient's detriment.   2. Risk Assessment: protracted risk is moderate, imminent risk is low - no interval change. Do note  that this is subject to change with the Alevism of new stressors, treatment non-adherence, use of substances, and/or new medical ails.   3. Monitoring: reviewed labs/imaging as populated above  4. Therapy: yet to reach out, will consider online options given time constraints  5. Follow-up: 2 months  6. Communications: N/A    TREATMENT PLAN/GOALS: challenge patterns of living conducive to symptom burden, change recommended regimen as above with augmentative, intermittent supportive psychotherapy to reduce symptom burden. Patient acknowledged and verbally consented to continue treatment. The importance of adherence to the recommended treatment and interval follow-up appointments was again emphasized today: patient has good treatment adherence per given history. Patient was today reminded to limit daily caffeine intake, hydrate appropriately, eat healthy and nutritious foods, engage sleep hygiene measures, engage appropriate exposure to sunlight, engage with hobbies in balance with life necessities, and exercise appropriate to their capacity to do so.     Billing: This encounter is of moderate complexity based on number/complexity of problems addressed today and risk of complications/morbidity: 2+ stable chronic illnesses and prescription management.     Parts of this note are electronic transcriptions/translations of spoken language to printed text using the Dragon Dictation system.    Electronically signed by Solo Hennessy MD, 05/17/23, [id number]

## 2023-05-25 ENCOUNTER — HOSPITAL ENCOUNTER (OUTPATIENT)
Dept: ULTRASOUND IMAGING | Facility: HOSPITAL | Age: 28
Discharge: HOME | End: 2023-05-25
Admitting: NURSE PRACTITIONER
Payer: COMMERCIAL

## 2023-05-25 DIAGNOSIS — O36.80X0 PREGNANCY WITH INCONCLUSIVE FETAL VIABILITY, SINGLE OR UNSPECIFIED FETUS: ICD-10-CM

## 2023-05-25 PROCEDURE — 76817 TRANSVAGINAL US OBSTETRIC: CPT

## 2023-06-06 ENCOUNTER — TELEPHONE (OUTPATIENT)
Dept: OBSTETRICS AND GYNECOLOGY | Facility: CLINIC | Age: 28
End: 2023-06-06

## 2023-06-06 NOTE — TELEPHONE ENCOUNTER
PT STATES SHE HAS HAD AN ECTOPIC PREGNANCY IN THE PAST AND WITH HER PREVIOUS PREGNANCY SHE WENT INTO [facility] AND HAD AN EMERGENCY  DUE TO [facility] ERUPTION. PT IS SCHEDULED TO BE SEEN ON 23 AND WILL BE AT AROUND 21 WEEKS. PT IS WANTING TO KNOW IF THERE IS ANY POSSIBILITY SHE CAN BE WORKED IN SOONER, PLEASE ADVISE PT.

## 2023-06-15 ENCOUNTER — INITIAL PRENATAL (OUTPATIENT)
Dept: OBSTETRICS AND GYNECOLOGY | Facility: CLINIC | Age: 28
End: 2023-06-15
Payer: COMMERCIAL

## 2023-06-15 VITALS — WEIGHT: 212 LBS | BODY MASS INDEX: 36.39 KG/M2 | SYSTOLIC BLOOD PRESSURE: 114 MMHG | DIASTOLIC BLOOD PRESSURE: 79 MMHG

## 2023-06-15 DIAGNOSIS — O09.899 HX OF PRETERM DELIVERY, CURRENTLY PREGNANT: ICD-10-CM

## 2023-06-15 DIAGNOSIS — Z34.80 SUPERVISION OF OTHER NORMAL PREGNANCY, ANTEPARTUM: Primary | ICD-10-CM

## 2023-06-15 DIAGNOSIS — Z34.91 FIRST TRIMESTER PREGNANCY: ICD-10-CM

## 2023-06-15 DIAGNOSIS — Z98.891 PREVIOUS CESAREAN SECTION: ICD-10-CM

## 2023-06-15 LAB
ABO GROUP BLD: NORMAL
BASOPHILS # BLD AUTO: 0.02 10*3/MM3 (ref 0–0.2)
BASOPHILS NFR BLD AUTO: 0.2 % (ref 0–1.5)
BLD GP AB SCN SERPL QL: NEGATIVE
DEPRECATED RDW RBC AUTO: 38.6 FL (ref 37–54)
EOSINOPHIL # BLD AUTO: 0.1 10*3/MM3 (ref 0–0.4)
EOSINOPHIL NFR BLD AUTO: 1.1 % (ref 0.3–6.2)
ERYTHROCYTE [DISTWIDTH] IN BLOOD BY AUTOMATED COUNT: 12.1 % (ref 12.3–15.4)
GLUCOSE UR STRIP-MCNC: NEGATIVE MG/DL
HBV SURFACE AG SERPL QL IA: NORMAL
HCT VFR BLD AUTO: 40.5 % (ref 34–46.6)
HGB BLD-MCNC: 14.2 G/DL (ref 12–15.9)
HIV1+2 AB SER QL: NORMAL
IMM GRANULOCYTES # BLD AUTO: 0.04 10*3/MM3 (ref 0–0.05)
IMM GRANULOCYTES NFR BLD AUTO: 0.4 % (ref 0–0.5)
LYMPHOCYTES # BLD AUTO: 1.28 10*3/MM3 (ref 0.7–3.1)
LYMPHOCYTES NFR BLD AUTO: 13.5 % (ref 19.6–45.3)
MCH RBC QN AUTO: 30.4 PG (ref 26.6–33)
MCHC RBC AUTO-ENTMCNC: 35.1 G/DL (ref 31.5–35.7)
MCV RBC AUTO: 86.7 FL (ref 79–97)
MONOCYTES # BLD AUTO: 0.4 10*3/MM3 (ref 0.1–0.9)
MONOCYTES NFR BLD AUTO: 4.2 % (ref 5–12)
NEUTROPHILS NFR BLD AUTO: 7.63 10*3/MM3 (ref 1.7–7)
NEUTROPHILS NFR BLD AUTO: 80.6 % (ref 42.7–76)
NRBC BLD AUTO-RTO: 0 /100 WBC (ref 0–0.2)
PLATELET # BLD AUTO: 208 10*3/MM3 (ref 140–450)
PMV BLD AUTO: 10.8 FL (ref 6–12)
PROT UR STRIP-MCNC: NEGATIVE MG/DL
RBC # BLD AUTO: 4.67 10*6/MM3 (ref 3.77–5.28)
RH BLD: POSITIVE
T PALLIDUM IGG SER QL: NORMAL
WBC NRBC COR # BLD: 9.47 10*3/MM3 (ref 3.4–10.8)

## 2023-06-15 PROCEDURE — 86780 TREPONEMA PALLIDUM: CPT | Performed by: NURSE PRACTITIONER

## 2023-06-15 PROCEDURE — 86900 BLOOD TYPING SEROLOGIC ABO: CPT | Performed by: NURSE PRACTITIONER

## 2023-06-15 PROCEDURE — 87086 URINE CULTURE/COLONY COUNT: CPT | Performed by: NURSE PRACTITIONER

## 2023-06-15 PROCEDURE — G0432 EIA HIV-1/HIV-2 SCREEN: HCPCS | Performed by: NURSE PRACTITIONER

## 2023-06-15 PROCEDURE — 87340 HEPATITIS B SURFACE AG IA: CPT | Performed by: NURSE PRACTITIONER

## 2023-06-15 PROCEDURE — 86803 HEPATITIS C AB TEST: CPT | Performed by: NURSE PRACTITIONER

## 2023-06-15 PROCEDURE — 86901 BLOOD TYPING SEROLOGIC RH(D): CPT | Performed by: NURSE PRACTITIONER

## 2023-06-15 PROCEDURE — 85025 COMPLETE CBC W/AUTO DIFF WBC: CPT | Performed by: NURSE PRACTITIONER

## 2023-06-15 PROCEDURE — 86850 RBC ANTIBODY SCREEN: CPT | Performed by: NURSE PRACTITIONER

## 2023-06-15 NOTE — PROGRESS NOTES
OB Initial Visit    CC- Here for care of current pregnancy, first visit    Subjective:  28 y.o.  presenting for her first obstetrical visit.    LMP: Patient's last menstrual period was 2023 (exact date).     Pt complains of  nausea    States nausea is much improved this week.  Reports  labor at 31 and 33 weeks with first pregnancy with delivery at 35w5d due to abruption.     Reviewed and updated:  OBHx, GYNHx (STDs), PMHx, Medications, Allergies, PSHx, Social Hx, Preventative Hx (PAP), Hx of abuse/safe environment, Vaccine Hx including hx of chickenpox or vaccine, Genetic Hx (pt, FOB, both families).        Objective:  /79   Wt 96.2 kg (212 lb)   LMP 2023 (Exact Date)   BMI 36.39 kg/m²        General- NAD, alert and oriented, appropriate  Psych- Normal mood, good memory  Neck- No masses, no thyroid enlargement  CV- Regular rhythm, no murnurs  Resp- CTA to bases, no wheezes  Abdomen- Soft, non distended, non tender, no masses    Breast left- deferred  Breast right- deferred    External genitalia- Normal, no lesions  Urethra- Normal, no masses, non tender  Vagina- Normal, no discharge  Bladder- Normal, no masses, non tender  Cvx- Normal, no lesions, no discharge, no CMT  Uterus- Normal shape and consistency, non tender, Consistent with dates, Bedside US consistent with dates.  -160..    Adnexa- Normal, no mass, non tender    Lymphatic- No palpable neck, axillary, or groin nodes  Ext- No edema, no cyanosis    Skin- No lesions, no rashes, no acanthosis nigricans    Assessment and Plan:  11w3d  Diagnoses and all orders for this visit:    1. Supervision of other normal pregnancy, antepartum (Primary)  Overview:  TALIB finalized: 24 per 8-week US    Genetic testing (NIPS-Quad)/CF/AFP:  Discussed all, desires NIPS and CF/SMA at new OB    COVID: Fully vaccinated  Flu:  Tdap:    Rhogam:  ? Desires Sterilization:    Anatomy US:  FU US:    PROBLEM LIST/PLAN:   Hx of   labor/delivery:   labor at 31 and 33 weeks with first pregnancy, delivered at 35w5d due to abruption.    Previous  - unsure if wants to have a TOLAC        Orders:  -     POC Urinalysis Dipstick  -     Cancel: IGP,CtNgTv,Apt HPV,rfx 16 / 18,45  -     OB Panel With HIV  -     Cancel: Urinalysis With Microscopic - Urine, Clean Catch  -     Urine Culture - Urine, Urine, Random Void  -     Hemoglobinopathy Fractionation Cascade  -     Chlamydia trachomatis, Neisseria gonorrhoeae, Trichomonas vaginalis, PCR - Swab, Cervix  -     Inheritest (R) CF/SMA Panel - Blood, Arm, Left  -     [id number] PLUS Core+SCA+ESS - Blood, Arm, Left    2. First trimester pregnancy  -     Inheritest (R) CF/SMA Panel - Blood, Arm, Left  -     [id number] PLUS Core+SCA+ESS - Blood, Arm, Left    3. Previous  section  Overview:  Delivered first baby at 35w5d due to abruption      4. Hx of  delivery, currently pregnant  Overview:   labor at 31 and 33 weeks with first pregnancy, delivered at 35w5d due to abruption.  Discussed cervical length ultrasounds beginning at 16 weeks, verbalizes understanding.    Orders:  -     US Ob Transvaginal; Standing        Genetic Screening:   CF  NIPS    Vaccines:   s/p COVID vaccine    Counseling:   Nutrition discussed, calories, activity/exercise in pregnancy  Discussed dietary restrictions/safety food preparation in pregnancy  Reviewed what to expect prenatal visits, office providers and [facility] hospitalists  Appropriate trimester precautions provided, N/V, vag bleeding, cramping  Questions answered    Labs:   Prenatal labs, cultures, and PAP performed (prn), NIPS, CF/SMA    Return in about 4 weeks (around 2023) for [facility], OB follow up.      Renny Mcdaniels, APRN  06/15/2023    [facility] [address]  [facility]  [address] [address]  Dept: [phone]  Loc: [phone]

## 2023-06-16 PROBLEM — Z28.39 RUBELLA NON-IMMUNE STATUS, ANTEPARTUM: Status: ACTIVE | Noted: 2023-06-16

## 2023-06-16 PROBLEM — O09.899 RUBELLA NON-IMMUNE STATUS, ANTEPARTUM: Status: ACTIVE | Noted: 2023-06-16

## 2023-06-16 LAB
BACTERIA SPEC AEROBE CULT: NO GROWTH
HCV AB SER DONR QL: NORMAL
HGB A MFR BLD ELPH: 97.2 % (ref 96.4–98.8)
HGB A2 MFR BLD ELPH: 2.8 % (ref 1.8–3.2)
HGB F MFR BLD ELPH: 0 % (ref 0–2)
HGB FRACT BLD-IMP: NORMAL
HGB S MFR BLD ELPH: 0 %
RUBV IGG SERPL IA-ACNC: <0.9 INDEX

## 2023-06-19 LAB
C TRACH RRNA SPEC QL NAA+PROBE: NEGATIVE
N GONORRHOEA RRNA SPEC QL NAA+PROBE: NEGATIVE
T VAGINALIS RRNA SPEC QL NAA+PROBE: NORMAL

## 2023-06-26 LAB
CITATION REF LAB TEST: NORMAL
ETHNIC BACKGROUND STATED: NORMAL
GENE DIS ANL CARRIER INTERP-IMP: NORMAL
GENE STUDIED ID: NORMAL
LAB DIRECTOR NAME PROVIDER: NORMAL
Lab: NORMAL
MOL DX INTERP BLD/T QL: NORMAL
REASON FOR REFERRAL (NARRATIVE): NORMAL
RECOMMENDATION PATIENT DOC-IMP: NORMAL
REF LAB TEST METHOD: NORMAL
SERVICE CMNT-IMP: NORMAL
SPECIMEN SOURCE: NORMAL

## 2023-07-16 PROBLEM — Z87.59 HISTORY OF PLACENTAL ABRUPTION: Status: ACTIVE | Noted: 2023-07-16

## 2023-07-31 ENCOUNTER — ROUTINE PRENATAL (OUTPATIENT)
Dept: OBSTETRICS AND GYNECOLOGY | Facility: CLINIC | Age: 28
End: 2023-07-31
Payer: COMMERCIAL

## 2023-07-31 VITALS — DIASTOLIC BLOOD PRESSURE: 85 MMHG | BODY MASS INDEX: 36.9 KG/M2 | SYSTOLIC BLOOD PRESSURE: 122 MMHG | WEIGHT: 215 LBS

## 2023-07-31 DIAGNOSIS — Z98.891 PREVIOUS CESAREAN SECTION: ICD-10-CM

## 2023-07-31 DIAGNOSIS — Z87.59 HISTORY OF PLACENTAL ABRUPTION: ICD-10-CM

## 2023-07-31 DIAGNOSIS — F31.81 BIPOLAR II DISORDER: ICD-10-CM

## 2023-07-31 DIAGNOSIS — O09.899 RUBELLA NON-IMMUNE STATUS, ANTEPARTUM: ICD-10-CM

## 2023-07-31 DIAGNOSIS — O09.899 HX OF PRETERM DELIVERY, CURRENTLY PREGNANT: ICD-10-CM

## 2023-07-31 DIAGNOSIS — K21.9 GASTROESOPHAGEAL REFLUX DISEASE WITHOUT ESOPHAGITIS: ICD-10-CM

## 2023-07-31 DIAGNOSIS — Z28.39 RUBELLA NON-IMMUNE STATUS, ANTEPARTUM: ICD-10-CM

## 2023-07-31 DIAGNOSIS — Z34.80 SUPERVISION OF OTHER NORMAL PREGNANCY, ANTEPARTUM: Primary | ICD-10-CM

## 2023-07-31 LAB
GLUCOSE UR STRIP-MCNC: NEGATIVE MG/DL
PROT UR STRIP-MCNC: NEGATIVE MG/DL

## 2023-07-31 RX ORDER — PANTOPRAZOLE SODIUM 20 MG/1
20 TABLET, DELAYED RELEASE ORAL DAILY
Qty: 30 TABLET | Refills: 4 | Status: SHIPPED | OUTPATIENT
Start: 2023-07-31 | End: 2024-07-30

## 2023-08-11 NOTE — PROGRESS NOTES
Routine Prenatal Visit     Subjective  Shruti Pearl is a 28 y.o.  at 20w0d here for her routine OB visit.   She is taking her prenatal vitamins.Reports no loss of fluid or vaginal bleeding. Patient doing well without any complaints. Pregnancy is complicated by:     Patient Active Problem List   Diagnosis    History of anxiety disorder    Asthma    Bipolar II disorder    ADHD (attention deficit hyperactivity disorder), combined type    Post traumatic stress disorder (PTSD)    Marijuana use    Nicotine dependence, cigarettes, in remission    Cervical high risk HPV (human papillomavirus) test positive    Irregular menses    Supervision of other normal pregnancy, antepartum    Previous  section    Hx of  delivery, currently pregnant    Rubella non-immune status, antepartum    History of placental abruption         OB History    Para Term  AB Living   3 1   1 1 1   SAB IAB Ectopic Molar Multiple Live Births       1     1      # Outcome Date GA Lbr Olegario/2nd Weight Sex Delivery Anes PTL Lv   3 Current            2 Ectopic 2018 8w0d    ECTOPIC      1  14 35w5d  2929 g (6 lb 7.3 oz) M CS-LTranv Spinal Y CINDY           ROS:   General ROS: negative for - chills or fatigue  Respiratory ROS: negative for - cough or hemoptysis  Cardiovascular ROS: negative for - chest pain or dyspnea on exertion  Genito-Urinary ROS: negative for  change in urinary stream, vaginal discharge   Musculoskeletal ROS: negative for - gait disturbance or joint pain  Dermatological ROS: negative for acne,  dry skin or itching    Objective  Physical Exam:   Vitals:    23 0947   BP: 92/67       Uterine Size: size equals dates  FHT: 110-160 BPM    General appearance - alert, well appearing, and in no distress  Mental status - alert, oriented to person, place, and time  Abdomen- Soft, Gravid uterus, non-tender to palpation  Musculoskeletal: negative for - gait disturbance or joint pain  Extremeties:  negative swelling or cyanosis   Dermatological: negative rashes or skin lesions       Assessment/Plan:   Diagnoses and all orders for this visit:    1. Supervision of other normal pregnancy, antepartum (Primary)  Assessment & Plan:  TALIB finalized: 24 per 8-week US    Genetic testing (NIPS-Quad)/CF/AFP:  Discussed all,  CF/SMA negative, NIPS negative    COVID: Fully vaccinated  Flu:recommended   Tdap:Script 27-36 weeks     Rhogam: B positive  ? Desires Sterilization:    Anatomy US:ordered   FU US:    PROBLEM LIST/PLAN:   Hx of  labor/delivery:   labor at 31 and 33 weeks with first pregnancy, delivered at 35w5d due to abruption  Previous  - unsure if wants to have a TOLAC-  82%  Obesity-BMI >35- testing 37 weeks   Bipolar 2-NO meds, stable   Rubella nonimmune-MMR postpartum     Orders:  -     POC Urinalysis Dipstick    2. History of placental abruption    3. Rubella non-immune status, antepartum    4. Previous  section    5. Hx of  delivery, currently pregnant          Counseling:   Second trimester precautions  Round Ligament Pain:  The uterus has several ligaments which provide support and keep the uterus in place. As the  uterus grows these ligaments are pulled and stretched which often causes sharp stabbing like pain in the inguinal area.   You may find a pregnancy support band helpful. Changing positions may also help. Yoga is a great way to cope with round ligament and low back pain in pregnancy.    Massage may also help with low back pain   Things to Consider at this Point in your Pregnancy:  Some women experience swelling in their feet during pregnancy. Compression stockings may help  Drink plenty of water and stay active   Make sure you are eating frequent small meals, nuts are a wonderful snack to keep with you            Return in about 4 weeks (around 2023) for Routine OB visit.      We have gone over prenatal care to include the timing and  content of visits. I informed her how to contact the office and/or on call person in the event of any problems and encouraged her to do so when she feels it is necessary.  We then spent time answering her questions which she indicated were answered to her satisfaction.    Leslie Kessler DO  8/14/2023 09:58 EDT

## 2023-08-11 NOTE — ASSESSMENT & PLAN NOTE
TALIB finalized: 24 per 8-week US    Genetic testing (NIPS-Quad)/CF/AFP:  Discussed all,  CF/SMA negative, NIPS negative    COVID: Fully vaccinated  Flu:recommended   Tdap:Script 27-36 weeks     Rhogam: B positive  ? Desires Sterilization:    Anatomy US:ordered   FU US:    PROBLEM LIST/PLAN:   Hx of  labor/delivery:   labor at 31 and 33 weeks with first pregnancy, delivered at 35w5d due to abruption  Previous  - unsure if wants to have a TOLAC-  82%  Obesity-BMI >35- testing 37 weeks   Bipolar 2-NO meds, stable   Rubella nonimmune-MMR postpartum

## 2023-08-14 ENCOUNTER — ROUTINE PRENATAL (OUTPATIENT)
Dept: OBSTETRICS AND GYNECOLOGY | Facility: CLINIC | Age: 28
End: 2023-08-14
Payer: COMMERCIAL

## 2023-08-14 ENCOUNTER — PATIENT OUTREACH (OUTPATIENT)
Dept: LABOR AND DELIVERY | Facility: HOSPITAL | Age: 28
End: 2023-08-14
Payer: COMMERCIAL

## 2023-08-14 VITALS — DIASTOLIC BLOOD PRESSURE: 67 MMHG | BODY MASS INDEX: 37.42 KG/M2 | SYSTOLIC BLOOD PRESSURE: 92 MMHG | WEIGHT: 218 LBS

## 2023-08-14 DIAGNOSIS — O09.899 HX OF PRETERM DELIVERY, CURRENTLY PREGNANT: ICD-10-CM

## 2023-08-14 DIAGNOSIS — Z98.891 PREVIOUS CESAREAN SECTION: ICD-10-CM

## 2023-08-14 DIAGNOSIS — Z87.59 HISTORY OF PLACENTAL ABRUPTION: ICD-10-CM

## 2023-08-14 DIAGNOSIS — Z34.80 SUPERVISION OF OTHER NORMAL PREGNANCY, ANTEPARTUM: Primary | ICD-10-CM

## 2023-08-14 DIAGNOSIS — Z28.39 RUBELLA NON-IMMUNE STATUS, ANTEPARTUM: ICD-10-CM

## 2023-08-14 DIAGNOSIS — O09.899 RUBELLA NON-IMMUNE STATUS, ANTEPARTUM: ICD-10-CM

## 2023-08-14 LAB
GLUCOSE UR STRIP-MCNC: NEGATIVE MG/DL
PROT UR STRIP-MCNC: NEGATIVE MG/DL

## 2023-08-14 RX ORDER — PRENATAL VIT NO.126/IRON/FOLIC 28MG-0.8MG
TABLET ORAL DAILY
COMMUNITY

## 2023-08-14 NOTE — OUTREACH NOTE
[facility]  Enrollment    Current Estimated Gestational Age: 20w0d    Questions/Answers      Flowsheet Row Responses   Would like to participate? Yes          Intake Assessment      Flowsheet Row Responses   Best Method for Contacting Cell   Currently Employed Yes  [Issues with job working with restrictions]   Able to keep appointments as scheduled Yes   Gender(s) and Name(s) Boy - undecided   Resources Presently Utilizing: None   Other: Provided   Comments [place] Pregnancy Laws, Breast Pump ordering   Other Education [facility] Benefits, HANDS, Insurance benefits/Incentives, Other            Learning Assessment      Flowsheet Row Responses   Relationship Patient, Significant Other   Does the learner have any barriers to learning? No Barriers   What is the preferred language of the learner for medical teaching? English   Is an  required? No            Met with patient and Anthony at office visit. Doing well. Denies any concerns with food, housing or transportation. [facility] has documented work restrictions but workplace is not honoring them, documents sent via [employer] for reference to [place] laws on pregnancy and workplace. Information sent about ordering a breast pump. No other questions, needs or concerns.       Tobacco, Alcohol, and Drug History     reports that she has quit smoking. Her smoking use included cigarettes. She has a 8.00 pack-year smoking history. She has been exposed to tobacco smoke. She has never used smokeless tobacco.   reports that she does not currently use alcohol.   reports that she does not currently use drugs after having used the following drugs: Marijuana.    Francia Holloway RN  Maternity Nurse Navigator    8/14/2023, 09:59 EDT

## 2023-08-28 PROBLEM — F17.211 NICOTINE DEPENDENCE, CIGARETTES, IN REMISSION: Status: RESOLVED | Noted: 2022-11-22 | Resolved: 2023-08-28

## 2023-08-28 PROBLEM — F12.90 MARIJUANA USE: Status: RESOLVED | Noted: 2022-11-22 | Resolved: 2023-08-28

## 2023-08-28 PROBLEM — N92.6 IRREGULAR MENSES: Status: RESOLVED | Noted: 2023-01-23 | Resolved: 2023-08-28

## 2023-08-28 PROBLEM — Z86.59 HISTORY OF ANXIETY DISORDER: Status: RESOLVED | Noted: 2021-10-28 | Resolved: 2023-08-28

## 2023-08-28 PROBLEM — J45.909 ASTHMA: Status: RESOLVED | Noted: 2021-10-28 | Resolved: 2023-08-28

## 2023-08-28 NOTE — PROGRESS NOTES
Routine Prenatal Visit     Subjective  Shruti Pearl is a 28 y.o.  at 22w1d here for her routine OB visit.   She is taking her prenatal vitamins.Reports no loss of fluid or vaginal bleeding. Patient doing well without any complaints. Pregnancy is complicated by:     Patient Active Problem List   Diagnosis    Bipolar II disorder    ADHD (attention deficit hyperactivity disorder), combined type    Post traumatic stress disorder (PTSD)    Marijuana use    Cervical high risk HPV (human papillomavirus) test positive    Supervision of other normal pregnancy, antepartum    Previous  section    Hx of  delivery, currently pregnant    Rubella non-immune status, antepartum    History of placental abruption    Desires  (vaginal birth after ) trial         OB History    Para Term  AB Living   3 1   1 1 1   SAB IAB Ectopic Molar Multiple Live Births       1     1      # Outcome Date GA Lbr Olegario/2nd Weight Sex Delivery Anes PTL Lv   3 Current            2 Ectopic 2018 8w0d    ECTOPIC      1  14 35w5d  2929 g (6 lb 7.3 oz) M CS-LTranv Spinal Y CINDY           ROS:   General ROS: negative for - chills or fatigue  Respiratory ROS: negative for - cough or hemoptysis  Cardiovascular ROS: negative for - chest pain or dyspnea on exertion  Genito-Urinary ROS: negative for  change in urinary stream, vaginal discharge   Musculoskeletal ROS: negative for - gait disturbance or joint pain  Dermatological ROS: negative for acne,  dry skin or itching    Objective  Physical Exam:   Vitals:    23 0940   BP: 108/74       Uterine Size: size equals dates  FHT: 110-160 BPM    General appearance - alert, well appearing, and in no distress  Mental status - alert, oriented to person, place, and time  Abdomen- Soft, Gravid uterus, non-tender to palpation  Musculoskeletal: negative for - gait disturbance or joint pain  Extremeties: negative swelling or cyanosis   Dermatological:  negative rashes or skin lesions       Assessment/Plan:   Diagnoses and all orders for this visit:    1. Supervision of other normal pregnancy, antepartum (Primary)  Assessment & Plan:  TALIB finalized: 24 per 8-week US    Genetic testing (NIPS-Quad)/CF/AFP:  Discussed all,  CF/SMA negative, NIPS negative    COVID: Fully vaccinated  Flu:recommended   Tdap:Script 27-36 weeks     Rhogam: B positive  ? Desires Sterilization:    Anatomy US:Cervical Length WNL 5cm, All anatomy seen and WNL, Growtyh 56%, AC 68%, 3VC-unable to see cord insertion, follow up with next cervical length, vertex, anterior placenta-grade 1, boy   FU US:    PROBLEM LIST/PLAN:   Hx of  labor/delivery:   labor at 31 and 33 weeks with first pregnancy, delivered at 35w5d due to abruption  Previous  - unsure if wants to have a TOLAC-  82%  Obesity-BMI >35- testing 37 weeks   Bipolar 2-NO meds, stable   Rubella nonimmune-MMR postpartum     Orders:  -     POC Urinalysis Dipstick    2. Rubella non-immune status, antepartum    3. Previous  section    4. Hx of  delivery, currently pregnant    5. Desires  (vaginal birth after ) trial    6. History of placental abruption          Counseling:   OB precautions, leaking, VB, radha acosta vs PTL/Labor  FKC  HTN precautions reviewed: HA, vision change, RUQ/epigastric pain, edema  Round Ligament Pain:  The uterus has several ligaments which provide support and keep the uterus in place. As the  uterus grows these ligaments are pulled and stretched which often causes sharp stabbing like pain in the inguinal area.   You may find a pregnancy support band helpful. Changing positions may also help. Yoga is a great way to cope with round ligament and low back pain in pregnancy.    Massage may also help with low back pain   Things to Consider at this Point in your Pregnancy:  Some women experience swelling in their feet during pregnancy. Compression stockings  may help  Drink plenty of water and stay active   Make sure you are eating frequent small meals, nuts are a wonderful snack to keep with you            Return in about 4 weeks (around 9/26/2023) for Routine OB visit.      We have gone over prenatal care to include the timing and content of visits. I informed her how to contact the office and/or on call person in the event of any problems and encouraged her to do so when she feels it is necessary.  We then spent time answering her questions which she indicated were answered to her satisfaction.    Leslie Kessler,   8/29/2023 09:53 EDT

## 2023-08-28 NOTE — ASSESSMENT & PLAN NOTE
TALIB finalized: 24 per 8-week US    Genetic testing (NIPS-Quad)/CF/AFP:  Discussed all,  CF/SMA negative, NIPS negative    COVID: Fully vaccinated  Flu:recommended   Tdap:Script 27-36 weeks     Rhogam: B positive  ? Desires Sterilization:    Anatomy US:Cervical Length WNL 5cm, All anatomy seen and WNL, Growtyh 56%, AC 68%, 3VC-unable to see cord insertion, follow up with next cervical length, vertex, anterior placenta-grade 1, boy   FU US:    PROBLEM LIST/PLAN:   Hx of  labor/delivery:   labor at 31 and 33 weeks with first pregnancy, delivered at 35w5d due to abruption  Previous  - unsure if wants to have a TOLAC-  82%  Obesity-BMI >35- testing 37 weeks   Bipolar 2-NO meds, stable   Rubella nonimmune-MMR postpartum

## 2023-08-29 ENCOUNTER — ROUTINE PRENATAL (OUTPATIENT)
Dept: OBSTETRICS AND GYNECOLOGY | Facility: CLINIC | Age: 28
End: 2023-08-29
Payer: COMMERCIAL

## 2023-08-29 VITALS — BODY MASS INDEX: 37.59 KG/M2 | SYSTOLIC BLOOD PRESSURE: 108 MMHG | DIASTOLIC BLOOD PRESSURE: 74 MMHG | WEIGHT: 219 LBS

## 2023-08-29 DIAGNOSIS — Z28.39 RUBELLA NON-IMMUNE STATUS, ANTEPARTUM: ICD-10-CM

## 2023-08-29 DIAGNOSIS — Z34.80 SUPERVISION OF OTHER NORMAL PREGNANCY, ANTEPARTUM: Primary | ICD-10-CM

## 2023-08-29 DIAGNOSIS — Z87.59 HISTORY OF PLACENTAL ABRUPTION: ICD-10-CM

## 2023-08-29 DIAGNOSIS — O09.899 HX OF PRETERM DELIVERY, CURRENTLY PREGNANT: ICD-10-CM

## 2023-08-29 DIAGNOSIS — Z98.891 PREVIOUS CESAREAN SECTION: ICD-10-CM

## 2023-08-29 DIAGNOSIS — O09.899 RUBELLA NON-IMMUNE STATUS, ANTEPARTUM: ICD-10-CM

## 2023-08-29 DIAGNOSIS — O34.219 DESIRES VBAC (VAGINAL BIRTH AFTER CESAREAN) TRIAL: ICD-10-CM

## 2023-08-29 LAB
GLUCOSE UR STRIP-MCNC: NEGATIVE MG/DL
PROT UR STRIP-MCNC: ABNORMAL MG/DL

## 2023-09-08 DIAGNOSIS — O09.899 HISTORY OF PRETERM DELIVERY, CURRENTLY PREGNANT: Primary | ICD-10-CM

## 2023-09-10 NOTE — ASSESSMENT & PLAN NOTE
labor at 31 and 33 weeks with first pregnancy, delivered at 35w5d due to abruption.  Discussed cervical length ultrasounds beginning at 16 weeks, verbalizes understanding.

## 2023-09-10 NOTE — PROGRESS NOTES
Routine Prenatal Visit     Subjective  Shruti Pearl is a 28 y.o.  at 24w0d here for her routine OB visit.   She is taking her prenatal vitamins.Reports no loss of fluid or vaginal bleeding. Patient doing well without any complaints. Pregnancy is complicated by:     Patient Active Problem List   Diagnosis    Bipolar II disorder    ADHD (attention deficit hyperactivity disorder), combined type    Post traumatic stress disorder (PTSD)    Marijuana use    Cervical high risk HPV (human papillomavirus) test positive    Supervision of other normal pregnancy, antepartum    Previous  section    Hx of  delivery, currently pregnant    Rubella non-immune status, antepartum    History of placental abruption    Desires  (vaginal birth after ) trial         OB History    Para Term  AB Living   3 1   1 1 1   SAB IAB Ectopic Molar Multiple Live Births       1     1      # Outcome Date GA Lbr Olegario/2nd Weight Sex Delivery Anes PTL Lv   3 Current            2 Ectopic 2018 8w0d    ECTOPIC      1  14 35w5d  2929 g (6 lb 7.3 oz) M CS-LTranv Spinal Y CINDY           ROS:   General ROS: negative for - chills or fatigue  Respiratory ROS: negative for - cough or hemoptysis  Cardiovascular ROS: negative for - chest pain or dyspnea on exertion  Genito-Urinary ROS: negative for  change in urinary stream, vaginal discharge   Musculoskeletal ROS: negative for - gait disturbance or joint pain  Dermatological ROS: negative for acne,  dry skin or itching    Objective  Physical Exam:   Vitals:    23 0932   BP: 114/76       Uterine Size: not examined, US today  FHT: 110-160 BPM    General appearance - alert, well appearing, and in no distress  Mental status - alert, oriented to person, place, and time  Abdomen- Soft, Gravid uterus, non-tender to palpation  Musculoskeletal: negative for - gait disturbance or joint pain  Extremeties: negative swelling or cyanosis   Dermatological:  negative rashes or skin lesions     Assessment/Plan:   Diagnoses and all orders for this visit:    1. Supervision of other normal pregnancy, antepartum (Primary)  Assessment & Plan:  TALIB finalized: 24 per 8-week US    Genetic testing (NIPS-Quad)/CF/AFP:  Discussed all,  CF/SMA negative, NIPS negative    COVID: Fully vaccinated  Flu:recommended   Tdap:Script 27-36 weeks     Rhogam: B positive  ? Desires Sterilization:    Anatomy US:Cervical Length WNL 5cm, All anatomy seen and WNL, Growtyh 56%, AC 68%, 3VC-unable to see cord insertion, follow up with next cervical length, vertex, anterior placenta-grade 1, boy   FU US:    PROBLEM LIST/PLAN:   Hx of  labor/delivery:   labor at 31 and 33 weeks with first pregnancy, delivered at 35w5d due to abruption  Previous  - unsure if wants to have a TOLAC-  82%  Obesity-BMI >35- testing 37 weeks   Bipolar 2-NO meds, stable   Rubella nonimmune-MMR postpartum     Orders:  -     POC Urinalysis Dipstick  -     CBC (No Diff)  -     Gestational Diabetes Screen 1 Hour    2. Hx of  delivery, currently pregnant  Assessment & Plan:   labor at 31 and 33 weeks with first pregnancy, delivered at 35w5d due to abruption.  Discussed cervical length ultrasounds beginning at 16 weeks, verbalizes understanding.      3. History of placental abruption    4. Desires  (vaginal birth after ) trial  Assessment & Plan:  82% success rate   Will discuss case with partners       5. Marijuana use  Assessment & Plan:  No longer uses             Counseling:   Second trimester precautions  Round Ligament Pain:  The uterus has several ligaments which provide support and keep the uterus in place. As the  uterus grows these ligaments are pulled and stretched which often causes sharp stabbing like pain in the inguinal area.   You may find a pregnancy support band helpful. Changing positions may also help. Yoga is a great way to cope with round  ligament and low back pain in pregnancy.    Massage may also help with low back pain   Things to Consider at this Point in your Pregnancy:  Some women experience swelling in their feet during pregnancy. Compression stockings may help  Drink plenty of water and stay active   Make sure you are eating frequent small meals, nuts are a wonderful snack to keep with you            Return in about 2 weeks (around 9/25/2023) for Routine OB visit.      We have gone over prenatal care to include the timing and content of visits. I informed her how to contact the office and/or on call person in the event of any problems and encouraged her to do so when she feels it is necessary.  We then spent time answering her questions which she indicated were answered to her satisfaction.    Leslie Kessler DO  9/11/2023 09:47 EDT

## 2023-09-11 ENCOUNTER — ROUTINE PRENATAL (OUTPATIENT)
Dept: OBSTETRICS AND GYNECOLOGY | Facility: CLINIC | Age: 28
End: 2023-09-11
Payer: COMMERCIAL

## 2023-09-11 VITALS — DIASTOLIC BLOOD PRESSURE: 76 MMHG | BODY MASS INDEX: 38.28 KG/M2 | SYSTOLIC BLOOD PRESSURE: 114 MMHG | WEIGHT: 223 LBS

## 2023-09-11 DIAGNOSIS — O34.219 DESIRES VBAC (VAGINAL BIRTH AFTER CESAREAN) TRIAL: ICD-10-CM

## 2023-09-11 DIAGNOSIS — Z34.80 SUPERVISION OF OTHER NORMAL PREGNANCY, ANTEPARTUM: Primary | ICD-10-CM

## 2023-09-11 DIAGNOSIS — Z87.59 HISTORY OF PLACENTAL ABRUPTION: ICD-10-CM

## 2023-09-11 DIAGNOSIS — F12.90 MARIJUANA USE: ICD-10-CM

## 2023-09-11 DIAGNOSIS — O09.899 HX OF PRETERM DELIVERY, CURRENTLY PREGNANT: ICD-10-CM

## 2023-09-11 LAB
GLUCOSE UR STRIP-MCNC: NEGATIVE MG/DL
PROT UR STRIP-MCNC: NEGATIVE MG/DL

## 2023-09-20 ENCOUNTER — HOSPITAL ENCOUNTER (EMERGENCY)
Facility: HOSPITAL | Age: 28
Discharge: HOME | End: 2023-09-21
Payer: COMMERCIAL

## 2023-09-20 VITALS
HEART RATE: 88 BPM | RESPIRATION RATE: 18 BRPM | HEIGHT: 64 IN | OXYGEN SATURATION: 99 % | BODY MASS INDEX: 38.43 KG/M2 | SYSTOLIC BLOOD PRESSURE: 114 MMHG | WEIGHT: 225.09 LBS | DIASTOLIC BLOOD PRESSURE: 76 MMHG | TEMPERATURE: 98.4 F

## 2023-09-20 LAB
ALBUMIN SERPL-MCNC: 3.4 G/DL (ref 3.5–5.2)
ALBUMIN/GLOB SERPL: 1.2 G/DL
ALP SERPL-CCNC: 56 U/L (ref 39–117)
ALT SERPL W P-5'-P-CCNC: 10 U/L (ref 1–33)
ANION GAP SERPL CALCULATED.3IONS-SCNC: 10.8 MMOL/L (ref 5–15)
AST SERPL-CCNC: 11 U/L (ref 1–32)
BACTERIA UR QL AUTO: ABNORMAL /HPF
BASOPHILS # BLD AUTO: 0.01 10*3/MM3 (ref 0–0.2)
BASOPHILS NFR BLD AUTO: 0.1 % (ref 0–1.5)
BILIRUB SERPL-MCNC: 0.5 MG/DL (ref 0–1.2)
BILIRUB UR QL STRIP: NEGATIVE
BUN SERPL-MCNC: 6 MG/DL (ref 6–20)
BUN/CREAT SERPL: 10.7 (ref 7–25)
CALCIUM SPEC-SCNC: 9.2 MG/DL (ref 8.6–10.5)
CHLORIDE SERPL-SCNC: 106 MMOL/L (ref 98–107)
CLARITY UR: ABNORMAL
CO2 SERPL-SCNC: 21.2 MMOL/L (ref 22–29)
COLOR UR: YELLOW
CREAT SERPL-MCNC: 0.56 MG/DL (ref 0.57–1)
DEPRECATED RDW RBC AUTO: 39.3 FL (ref 37–54)
EGFRCR SERPLBLD CKD-EPI 2021: 128.5 ML/MIN/1.73
EOSINOPHIL # BLD AUTO: 0.11 10*3/MM3 (ref 0–0.4)
EOSINOPHIL NFR BLD AUTO: 1.2 % (ref 0.3–6.2)
ERYTHROCYTE [DISTWIDTH] IN BLOOD BY AUTOMATED COUNT: 12.4 % (ref 12.3–15.4)
GLOBULIN UR ELPH-MCNC: 2.8 GM/DL
GLUCOSE SERPL-MCNC: 98 MG/DL (ref 65–99)
GLUCOSE UR STRIP-MCNC: NEGATIVE MG/DL
HCG INTACT+B SERPL-ACNC: 4507 MIU/ML
HCT VFR BLD AUTO: 36.6 % (ref 34–46.6)
HGB BLD-MCNC: 12.1 G/DL (ref 12–15.9)
HGB UR QL STRIP.AUTO: NEGATIVE
HOLD SPECIMEN: NORMAL
HOLD SPECIMEN: NORMAL
HYALINE CASTS UR QL AUTO: ABNORMAL /LPF
IMM GRANULOCYTES # BLD AUTO: 0.05 10*3/MM3 (ref 0–0.05)
IMM GRANULOCYTES NFR BLD AUTO: 0.6 % (ref 0–0.5)
KETONES UR QL STRIP: NEGATIVE
LEUKOCYTE ESTERASE UR QL STRIP.AUTO: ABNORMAL
LYMPHOCYTES # BLD AUTO: 1.55 10*3/MM3 (ref 0.7–3.1)
LYMPHOCYTES NFR BLD AUTO: 17.5 % (ref 19.6–45.3)
MCH RBC QN AUTO: 28.8 PG (ref 26.6–33)
MCHC RBC AUTO-ENTMCNC: 33.1 G/DL (ref 31.5–35.7)
MCV RBC AUTO: 87.1 FL (ref 79–97)
MONOCYTES # BLD AUTO: 0.5 10*3/MM3 (ref 0.1–0.9)
MONOCYTES NFR BLD AUTO: 5.6 % (ref 5–12)
NEUTROPHILS NFR BLD AUTO: 6.64 10*3/MM3 (ref 1.7–7)
NEUTROPHILS NFR BLD AUTO: 75 % (ref 42.7–76)
NITRITE UR QL STRIP: NEGATIVE
NRBC BLD AUTO-RTO: 0 /100 WBC (ref 0–0.2)
PH UR STRIP.AUTO: 7.5 [PH] (ref 5–8)
PLATELET # BLD AUTO: 203 10*3/MM3 (ref 140–450)
PMV BLD AUTO: 10.7 FL (ref 6–12)
POTASSIUM SERPL-SCNC: 3.6 MMOL/L (ref 3.5–5.2)
PROT SERPL-MCNC: 6.2 G/DL (ref 6–8.5)
PROT UR QL STRIP: NEGATIVE
RBC # BLD AUTO: 4.2 10*6/MM3 (ref 3.77–5.28)
RBC # UR STRIP: ABNORMAL /HPF
REF LAB TEST METHOD: ABNORMAL
SODIUM SERPL-SCNC: 138 MMOL/L (ref 136–145)
SP GR UR STRIP: 1.02 (ref 1–1.03)
SQUAMOUS #/AREA URNS HPF: ABNORMAL /HPF
UROBILINOGEN UR QL STRIP: ABNORMAL
WBC # UR STRIP: ABNORMAL /HPF
WBC NRBC COR # BLD: 8.86 10*3/MM3 (ref 3.4–10.8)
WHOLE BLOOD HOLD COAG: NORMAL
WHOLE BLOOD HOLD SPECIMEN: NORMAL

## 2023-09-20 PROCEDURE — 99283 EMERGENCY DEPT VISIT LOW MDM: CPT

## 2023-09-20 PROCEDURE — 81001 URINALYSIS AUTO W/SCOPE: CPT

## 2023-09-20 PROCEDURE — 36415 COLL VENOUS BLD VENIPUNCTURE: CPT

## 2023-09-20 PROCEDURE — 80053 COMPREHEN METABOLIC PANEL: CPT

## 2023-09-20 PROCEDURE — 93010 ELECTROCARDIOGRAM REPORT: CPT | Performed by: INTERNAL MEDICINE

## 2023-09-20 PROCEDURE — 85025 COMPLETE CBC W/AUTO DIFF WBC: CPT

## 2023-09-20 PROCEDURE — 93005 ELECTROCARDIOGRAM TRACING: CPT

## 2023-09-20 PROCEDURE — 84702 CHORIONIC GONADOTROPIN TEST: CPT | Performed by: EMERGENCY MEDICINE

## 2023-09-20 RX ORDER — SODIUM CHLORIDE 0.9 % (FLUSH) 0.9 %
10 SYRINGE (ML) INJECTION AS NEEDED
Status: DISCONTINUED | OUTPATIENT
Start: 2023-09-20 | End: 2023-09-21 | Stop reason: HOSPADM

## 2023-09-20 NOTE — ED PROVIDER NOTES
Time: 7:15 PM EDT  Date of encounter:  2023  Independent Historian/Clinical History and Information was obtained by:   Patient    History is limited by: N/A    Chief Complaint   Patient presents with    Headache    Dizziness    Nausea         History of Present Illness:  Patient is a 28 y.o. year old female who presents to the emergency department for evaluation of nausea, headache and dizziness x3 days.  Has a history of migraines with last one around 17 years old.  Admits to photosensitivity.  She is 25 weeks pregnant, still feeling the baby move.    HPI    Patient Care Team  Primary Care Provider: Provider, No Known    Past Medical History:     Allergies   Allergen Reactions    Erythromycin Unknown - Low Severity    Nitrofurantoin Macrocrystal Hives    Penicillins Hives     Past Medical History:   Diagnosis Date    Allergies     Anxiety     Asthma     Bipolar disorder     Depression     Kidney stone     Panic disorder     Placental abruption 2014    Premature labor after 22 weeks and before 37 weeks without delivery 2014    PTSD (post-traumatic stress disorder)     Status post  delivery 2014     Past Surgical History:   Procedure Laterality Date     SECTION       Family History   Problem Relation Age of Onset    Depression Mother     Bipolar disorder Mother     ADD / ADHD Mother     Depression Father     ADD / ADHD Father     Suicide Attempts Sister     Anxiety disorder Sister     ADD / ADHD Sister     Diabetes Maternal Grandfather     Diabetes Other         UNCLE    ADD / ADHD Son        Home Medications:  Prior to Admission medications    Medication Sig Start Date End Date Taking? Authorizing Provider   prenatal vitamin (prenatal, CLASSIC, vitamin) tablet Take  by mouth Daily.    Provider, MD Danish   busPIRone (BUSPAR) 10 MG tablet Take 1 tablet by mouth 3 (Three) Times a Day. 22  Nicole Gallagher APRN   escitalopram (Lexapro) 10 MG tablet Take 1  "tablet by mouth Daily. 4/28/22 5/19/22  Nicole Gallagher APRN        Social History:   Social History     Tobacco Use    Smoking status: Former     Packs/day: 4.00     Years: 2.00     Pack years: 8.00     Types: Cigarettes     Passive exposure: Past    Smokeless tobacco: Never   Vaping Use    Vaping Use: Never used   Substance Use Topics    Alcohol use: Not Currently    Drug use: Not Currently     Types: Marijuana         Review of Systems:  Review of Systems   Gastrointestinal:  Positive for nausea and vomiting.   Neurological:  Positive for dizziness and headaches.      Physical Exam:  /76 (BP Location: Right arm, Patient Position: Sitting)   Pulse 88   Temp 98.4 °F (36.9 °C) (Oral)   Resp 18   Ht 162.6 cm (64\")   Wt 102 kg (225 lb 1.4 oz)   LMP 03/17/2023 (Exact Date)   SpO2 99%   BMI 38.64 kg/m²         Physical Exam  HENT:      Head: Normocephalic.      Mouth/Throat:      Mouth: Mucous membranes are moist.   Eyes:      Pupils: Pupils are equal, round, and reactive to light.   Pulmonary:      Effort: Pulmonary effort is normal.   Abdominal:      General: There is no distension.   Musculoskeletal:      Cervical back: Neck supple.   Skin:     General: Skin is warm and dry.   Neurological:      General: No focal deficit present.      Mental Status: She is alert and oriented to person, place, and time.   Psychiatric:         Mood and Affect: Mood normal.         Behavior: Behavior normal.                  Procedures:  Procedures      Medical Decision Making:      Comorbidities that affect care:        External Notes reviewed:          The following orders were placed and all results were independently analyzed by me:  Orders Placed This Encounter   Procedures    Bieber Draw    Comprehensive Metabolic Panel    Urinalysis With Microscopic If Indicated (No Culture) - Urine, Clean Catch    CBC Auto Differential    Urinalysis, Microscopic Only - Urine, Clean Catch    hCG, Quantitative, Pregnancy    " ECG 12 Lead ED Triage Standing Order; Weak / Dizzy / AMS    CBC & Differential    Green Top (Gel)    Lavender Top    Gold Top - SST    Light Blue Top       Medications Given in the Emergency Department:  Medications - No data to display       ED Course:    The patient was initially evaluated in the triage area where orders were placed. The patient was later dispositioned by Rick Pearce PA-C.      The patient was advised to stay for completion of workup which includes but is not limited to communication of labs and radiological results, reassessment and plan. The patient was advised that leaving prior to disposition by a provider could result in critical findings that are not communicated to the patient.     ED Course as of 09/21/23 2347   Wed Sep 20, 2023   1916 --- PROVIDER IN TRIAGE NOTE ---    The patient was evaluated by Rick resendiz in triage. Orders were placed and the patient is currently awaiting disposition.    [AJ]      ED Course User Index  [AJ] Rick Pearce PA-C       Labs:    Lab Results (last 24 hours)       Procedure Component Value Units Date/Time    Comprehensive Metabolic Panel [[id number]]  (Abnormal) Collected: 09/21/23 1537    Specimen: Blood Updated: 09/21/23 1558     Glucose 119 mg/dL      BUN 6 mg/dL      Creatinine 0.60 mg/dL      Sodium 136 mmol/L      Potassium 3.5 mmol/L      Chloride 104 mmol/L      CO2 18.2 mmol/L      Calcium 9.6 mg/dL      Total Protein 6.8 g/dL      Albumin 3.6 g/dL      ALT (SGPT) 10 U/L      AST (SGOT) 12 U/L      Alkaline Phosphatase 61 U/L      Total Bilirubin 0.6 mg/dL      Globulin 3.2 gm/dL      A/G Ratio 1.1 g/dL      BUN/Creatinine Ratio 10.0     Anion Gap 13.8 mmol/L      eGFR 126.3 mL/min/1.73     Narrative:      GFR Normal >60  Chronic Kidney Disease <60  Kidney Failure <15      CBC & Differential [[id number]]  (Abnormal) Collected: 09/21/23 1537    Specimen: Blood Updated: 09/21/23 1541    Narrative:      The following orders were  created for panel order CBC & Differential.  Procedure                               Abnormality         Status                     ---------                               -----------         ------                     CBC Auto Differential[[id number]]        Abnormal            Final result                 Please view results for these tests on the individual orders.    CBC Auto Differential [[id number]]  (Abnormal) Collected: 09/21/23 1537    Specimen: Blood Updated: 09/21/23 1541     WBC 8.63 10*3/mm3      RBC 4.57 10*6/mm3      Hemoglobin 13.1 g/dL      Hematocrit 38.9 %      MCV 85.1 fL      MCH 28.7 pg      MCHC 33.7 g/dL      RDW 12.4 %      RDW-SD 38.2 fl      MPV 10.4 fL      Platelets 214 10*3/mm3      Neutrophil % 79.3 %      Lymphocyte % 14.0 %      Monocyte % 5.2 %      Eosinophil % 1.0 %      Basophil % 0.2 %      Immature Grans % 0.3 %      Neutrophils, Absolute 6.83 10*3/mm3      Lymphocytes, Absolute 1.21 10*3/mm3      Monocytes, Absolute 0.45 10*3/mm3      Eosinophils, Absolute 0.09 10*3/mm3      Basophils, Absolute 0.02 10*3/mm3      Immature Grans, Absolute 0.03 10*3/mm3      nRBC 0.0 /100 WBC     Urinalysis With Microscopic If Indicated (No Culture) - Urine, Clean Catch [[id number]]  (Abnormal) Collected: 09/21/23 1635    Specimen: Urine, Clean Catch Updated: 09/21/23 1650     Color, UA Yellow     Appearance, UA Turbid     pH, UA 6.0     Specific Gravity, UA 1.025     Glucose, UA Negative     Ketones, UA Trace     Bilirubin, UA Negative     Blood, UA Negative     Protein, UA Negative     Leuk Esterase, UA Moderate (2+)     Nitrite, UA Negative     Urobilinogen, UA 0.2 E.U./dL    Urinalysis, Microscopic Only - Urine, Clean Catch [[id number]]  (Abnormal) Collected: 09/21/23 1635    Specimen: Urine, Clean Catch Updated: 09/21/23 1650     RBC, UA 0-2 /HPF      WBC, UA Too Numerous to Count /HPF      Bacteria, UA 4+ /HPF      Squamous Epithelial Cells, UA 13-20 /HPF      Hyaline Casts, UA 3-6 /LPF       Methodology Automated Microscopy             Imaging:    No Radiology Exams Resulted Within Past 24 Hours      Differential Diagnosis and Discussion:      Headache: Differential diagnosis includes but is not limited to migraine, cluster headache, hypertension, tumor, subarachnoid bleeding, pseudotumor cerebri, temporal arteritis, infections, tension headache, and TMJ syndrome.        MDM           Patient Care Considerations:          Consultants/Shared Management Plan:        Social Determinants of Health:          Disposition and Care Coordination:    Eloped: This patient has left the emergency department or waiting room with no communication to myself, nursing or administrative staff. There was no opportunity to discuss the patient's decision to leave, provide medical advice or discuss alternatives to. The staff has made efforts to locate patient without success.        Final diagnoses:   None        ED Disposition       ED Disposition   Eloped    Condition   --    Comment   Called [facility] for room placement with no answer.               This medical record created using voice recognition software.             Rick Pearce PA-C  09/21/23 5610

## 2023-09-21 ENCOUNTER — TELEPHONE (OUTPATIENT)
Dept: OBSTETRICS AND GYNECOLOGY | Facility: CLINIC | Age: 28
End: 2023-09-21
Payer: COMMERCIAL

## 2023-09-21 ENCOUNTER — HOSPITAL ENCOUNTER (EMERGENCY)
Facility: HOSPITAL | Age: 28
Discharge: HOME | End: 2023-09-21
Attending: EMERGENCY MEDICINE
Payer: COMMERCIAL

## 2023-09-21 VITALS
BODY MASS INDEX: 38.05 KG/M2 | RESPIRATION RATE: 18 BRPM | TEMPERATURE: 97.9 F | SYSTOLIC BLOOD PRESSURE: 113 MMHG | DIASTOLIC BLOOD PRESSURE: 70 MMHG | HEIGHT: 64 IN | OXYGEN SATURATION: 100 % | HEART RATE: 86 BPM | WEIGHT: 222.88 LBS

## 2023-09-21 DIAGNOSIS — N30.00 ACUTE CYSTITIS WITHOUT HEMATURIA: ICD-10-CM

## 2023-09-21 DIAGNOSIS — R51.9 NONINTRACTABLE HEADACHE, UNSPECIFIED CHRONICITY PATTERN, UNSPECIFIED HEADACHE TYPE: Primary | ICD-10-CM

## 2023-09-21 LAB
ALBUMIN SERPL-MCNC: 3.6 G/DL (ref 3.5–5.2)
ALBUMIN/GLOB SERPL: 1.1 G/DL
ALP SERPL-CCNC: 61 U/L (ref 39–117)
ALT SERPL W P-5'-P-CCNC: 10 U/L (ref 1–33)
ANION GAP SERPL CALCULATED.3IONS-SCNC: 13.8 MMOL/L (ref 5–15)
AST SERPL-CCNC: 12 U/L (ref 1–32)
BACTERIA UR QL AUTO: ABNORMAL /HPF
BASOPHILS # BLD AUTO: 0.02 10*3/MM3 (ref 0–0.2)
BASOPHILS NFR BLD AUTO: 0.2 % (ref 0–1.5)
BILIRUB SERPL-MCNC: 0.6 MG/DL (ref 0–1.2)
BILIRUB UR QL STRIP: NEGATIVE
BUN SERPL-MCNC: 6 MG/DL (ref 6–20)
BUN/CREAT SERPL: 10 (ref 7–25)
CALCIUM SPEC-SCNC: 9.6 MG/DL (ref 8.6–10.5)
CHLORIDE SERPL-SCNC: 104 MMOL/L (ref 98–107)
CLARITY UR: ABNORMAL
CO2 SERPL-SCNC: 18.2 MMOL/L (ref 22–29)
COLOR UR: YELLOW
CREAT SERPL-MCNC: 0.6 MG/DL (ref 0.57–1)
DEPRECATED RDW RBC AUTO: 38.2 FL (ref 37–54)
EGFRCR SERPLBLD CKD-EPI 2021: 126.3 ML/MIN/1.73
EOSINOPHIL # BLD AUTO: 0.09 10*3/MM3 (ref 0–0.4)
EOSINOPHIL NFR BLD AUTO: 1 % (ref 0.3–6.2)
ERYTHROCYTE [DISTWIDTH] IN BLOOD BY AUTOMATED COUNT: 12.4 % (ref 12.3–15.4)
GLOBULIN UR ELPH-MCNC: 3.2 GM/DL
GLUCOSE SERPL-MCNC: 119 MG/DL (ref 65–99)
GLUCOSE UR STRIP-MCNC: NEGATIVE MG/DL
HCT VFR BLD AUTO: 38.9 % (ref 34–46.6)
HGB BLD-MCNC: 13.1 G/DL (ref 12–15.9)
HGB UR QL STRIP.AUTO: NEGATIVE
HYALINE CASTS UR QL AUTO: ABNORMAL /LPF
IMM GRANULOCYTES # BLD AUTO: 0.03 10*3/MM3 (ref 0–0.05)
IMM GRANULOCYTES NFR BLD AUTO: 0.3 % (ref 0–0.5)
KETONES UR QL STRIP: ABNORMAL
LEUKOCYTE ESTERASE UR QL STRIP.AUTO: ABNORMAL
LYMPHOCYTES # BLD AUTO: 1.21 10*3/MM3 (ref 0.7–3.1)
LYMPHOCYTES NFR BLD AUTO: 14 % (ref 19.6–45.3)
MCH RBC QN AUTO: 28.7 PG (ref 26.6–33)
MCHC RBC AUTO-ENTMCNC: 33.7 G/DL (ref 31.5–35.7)
MCV RBC AUTO: 85.1 FL (ref 79–97)
MONOCYTES # BLD AUTO: 0.45 10*3/MM3 (ref 0.1–0.9)
MONOCYTES NFR BLD AUTO: 5.2 % (ref 5–12)
NEUTROPHILS NFR BLD AUTO: 6.83 10*3/MM3 (ref 1.7–7)
NEUTROPHILS NFR BLD AUTO: 79.3 % (ref 42.7–76)
NITRITE UR QL STRIP: NEGATIVE
NRBC BLD AUTO-RTO: 0 /100 WBC (ref 0–0.2)
PH UR STRIP.AUTO: 6 [PH] (ref 5–8)
PLATELET # BLD AUTO: 214 10*3/MM3 (ref 140–450)
PMV BLD AUTO: 10.4 FL (ref 6–12)
POTASSIUM SERPL-SCNC: 3.5 MMOL/L (ref 3.5–5.2)
PROT SERPL-MCNC: 6.8 G/DL (ref 6–8.5)
PROT UR QL STRIP: NEGATIVE
QT INTERVAL: 382 MS
QTC INTERVAL: 401 MS
RBC # BLD AUTO: 4.57 10*6/MM3 (ref 3.77–5.28)
RBC # UR STRIP: ABNORMAL /HPF
REF LAB TEST METHOD: ABNORMAL
SODIUM SERPL-SCNC: 136 MMOL/L (ref 136–145)
SP GR UR STRIP: 1.02 (ref 1–1.03)
SQUAMOUS #/AREA URNS HPF: ABNORMAL /HPF
UROBILINOGEN UR QL STRIP: ABNORMAL
WBC # UR STRIP: ABNORMAL /HPF
WBC NRBC COR # BLD: 8.63 10*3/MM3 (ref 3.4–10.8)

## 2023-09-21 PROCEDURE — 81001 URINALYSIS AUTO W/SCOPE: CPT | Performed by: EMERGENCY MEDICINE

## 2023-09-21 PROCEDURE — 36415 COLL VENOUS BLD VENIPUNCTURE: CPT

## 2023-09-21 PROCEDURE — 80053 COMPREHEN METABOLIC PANEL: CPT | Performed by: EMERGENCY MEDICINE

## 2023-09-21 PROCEDURE — 25010000002 METOCLOPRAMIDE PER 10 MG: Performed by: EMERGENCY MEDICINE

## 2023-09-21 PROCEDURE — 99283 EMERGENCY DEPT VISIT LOW MDM: CPT

## 2023-09-21 PROCEDURE — 96374 THER/PROPH/DIAG INJ IV PUSH: CPT

## 2023-09-21 PROCEDURE — 85025 COMPLETE CBC W/AUTO DIFF WBC: CPT | Performed by: EMERGENCY MEDICINE

## 2023-09-21 PROCEDURE — 25010000002 DIPHENHYDRAMINE PER 50 MG: Performed by: EMERGENCY MEDICINE

## 2023-09-21 PROCEDURE — 96375 TX/PRO/DX INJ NEW DRUG ADDON: CPT

## 2023-09-21 RX ORDER — DIPHENHYDRAMINE HYDROCHLORIDE 50 MG/ML
12.5 INJECTION INTRAMUSCULAR; INTRAVENOUS ONCE
Status: COMPLETED | OUTPATIENT
Start: 2023-09-21 | End: 2023-09-21

## 2023-09-21 RX ORDER — ACETAMINOPHEN 500 MG
1000 TABLET ORAL ONCE
Status: COMPLETED | OUTPATIENT
Start: 2023-09-21 | End: 2023-09-21

## 2023-09-21 RX ORDER — CEPHALEXIN 500 MG/1
500 CAPSULE ORAL 4 TIMES DAILY
Qty: 28 CAPSULE | Refills: 0 | Status: SHIPPED | OUTPATIENT
Start: 2023-09-21 | End: 2023-09-28

## 2023-09-21 RX ORDER — METOCLOPRAMIDE 5 MG/1
10 TABLET ORAL 3 TIMES DAILY PRN
Qty: 10 TABLET | Refills: 0 | Status: SHIPPED | OUTPATIENT
Start: 2023-09-21

## 2023-09-21 RX ORDER — METOCLOPRAMIDE HYDROCHLORIDE 5 MG/ML
10 INJECTION INTRAMUSCULAR; INTRAVENOUS ONCE
Status: COMPLETED | OUTPATIENT
Start: 2023-09-21 | End: 2023-09-21

## 2023-09-21 RX ADMIN — METOCLOPRAMIDE HYDROCHLORIDE 10 MG: 5 INJECTION INTRAMUSCULAR; INTRAVENOUS at 16:47

## 2023-09-21 RX ADMIN — ACETAMINOPHEN 1000 MG: 500 TABLET ORAL at 16:47

## 2023-09-21 RX ADMIN — SODIUM CHLORIDE 1000 ML: 9 INJECTION, SOLUTION INTRAVENOUS at 16:47

## 2023-09-21 RX ADMIN — DIPHENHYDRAMINE HYDROCHLORIDE 12.5 MG: 50 INJECTION, SOLUTION INTRAMUSCULAR; INTRAVENOUS at 16:47

## 2023-09-21 NOTE — TELEPHONE ENCOUNTER
Left a message for the patient to discuss her MyChart message. She had been to the ER since the message was sent but looks like she left before evaluation by a provider.

## 2023-09-21 NOTE — TELEPHONE ENCOUNTER
Patient called back and advised she can try drinking a very cold caffeinated drink when taking the Tylenol. She was advised to try that for a few doses of tylenol in a row as directed on the bottle. If that does not help or she starts to have any visual disturbances she understands to go back to the ER and stay for treatment.

## 2023-09-21 NOTE — ED PROVIDER NOTES
Time: 3:24 PM EDT  Date of encounter:  2023  Independent Historian/Clinical History and Information was obtained by:   Patient    History is limited by: N/A    Chief Complaint   Patient presents with    Headache         History of Present Illness:  Patient is a 28 y.o. year old female who presents to the emergency department for evaluation of headache and dizziness x4 days.  She has tried taking Tylenol with no relief.  She is currently 25 weeks pregnant.  Still feeling baby move.  Was here yesterday but never seen.  Denies dysuria, hematuria or vaginal bleeding    HPI    Patient Care Team  Primary Care Provider: Provider, No Known    Past Medical History:     Allergies   Allergen Reactions    Erythromycin Unknown - Low Severity    Nitrofurantoin Macrocrystal Hives    Penicillins Hives     Past Medical History:   Diagnosis Date    Allergies     Anxiety     Asthma     Bipolar disorder     Depression     Kidney stone     Panic disorder     Placental abruption 2014    Premature labor after 22 weeks and before 37 weeks without delivery 2014    PTSD (post-traumatic stress disorder)     Status post  delivery 2014     Past Surgical History:   Procedure Laterality Date     SECTION       Family History   Problem Relation Age of Onset    Depression Mother     Bipolar disorder Mother     ADD / ADHD Mother     Depression Father     ADD / ADHD Father     Suicide Attempts Sister     Anxiety disorder Sister     ADD / ADHD Sister     Diabetes Maternal Grandfather     Diabetes Other         UNCLE    ADD / ADHD Son        Home Medications:  Prior to Admission medications    Medication Sig Start Date End Date Taking? Authorizing Provider   prenatal vitamin (prenatal, CLASSIC, vitamin) tablet Take  by mouth Daily.    Provider, MD Danish   busPIRone (BUSPAR) 10 MG tablet Take 1 tablet by mouth 3 (Three) Times a Day. 22  Nicole Gallagher APRN   escitalopram (Lexapro) 10 MG tablet  "Take 1 tablet by mouth Daily. 4/28/22 5/19/22  Elliott Nicoledeloris Hall, DILSHAD        Social History:   Social History     Tobacco Use    Smoking status: Former     Packs/day: 4.00     Years: 2.00     Pack years: 8.00     Types: Cigarettes     Passive exposure: Past    Smokeless tobacco: Never   Vaping Use    Vaping Use: Never used   Substance Use Topics    Alcohol use: Not Currently    Drug use: Not Currently     Types: Marijuana         Review of Systems:  Review of Systems   Constitutional: Negative.    Eyes: Negative.    Respiratory: Negative.     Cardiovascular: Negative.    Gastrointestinal: Negative.    Endocrine: Negative.    Genitourinary: Negative.  Negative for dysuria and hematuria.   Musculoskeletal: Negative.    Skin: Negative.    Allergic/Immunologic: Negative.    Neurological:  Positive for dizziness and headaches.   Hematological: Negative.    Psychiatric/Behavioral: Negative.        Physical Exam:  /70 (BP Location: Left arm, Patient Position: Sitting)   Pulse 86   Temp 97.9 °F (36.6 °C) (Oral)   Resp 18   Ht 162.6 cm (64\")   Wt 101 kg (222 lb 14.2 oz)   LMP 03/17/2023 (Exact Date)   SpO2 100%   BMI 38.26 kg/m²         Physical Exam  Vitals and nursing note reviewed.   Constitutional:       Appearance: Normal appearance. She is normal weight.   HENT:      Head: Normocephalic and atraumatic.      Nose: Nose normal.      Mouth/Throat:      Mouth: Mucous membranes are moist.   Eyes:      General:         Right eye: No discharge.         Left eye: No discharge.      Extraocular Movements: Extraocular movements intact.      Right eye: Normal extraocular motion and no nystagmus.      Left eye: Normal extraocular motion and no nystagmus.      Conjunctiva/sclera: Conjunctivae normal.      Pupils: Pupils are equal, round, and reactive to light.   Cardiovascular:      Rate and Rhythm: Normal rate and regular rhythm.      Heart sounds: Normal heart sounds.   Pulmonary:      Effort: Pulmonary effort " is normal.      Breath sounds: Normal breath sounds.   Musculoskeletal:         General: Normal range of motion.      Cervical back: Normal range of motion and neck supple.   Skin:     General: Skin is warm and dry.   Neurological:      General: No focal deficit present.      Mental Status: She is alert and oriented to person, place, and time.      Cranial Nerves: No cranial nerve deficit.      Motor: No weakness.      Gait: Gait normal.   Psychiatric:         Mood and Affect: Mood normal.         Behavior: Behavior normal.             Procedures:  Procedures      Medical Decision Making:      Comorbidities that affect care:    Pregnancy    External Notes reviewed:    Previous ED Note: [facility] ED visit from yesterday for headache.      The following orders were placed and all results were independently analyzed by me:  Orders Placed This Encounter   Procedures    Comprehensive Metabolic Panel    Urinalysis With Microscopic If Indicated (No Culture) - Urine, Clean Catch    CBC Auto Differential    Urinalysis, Microscopic Only - Urine, Clean Catch    CBC & Differential       Medications Given in the Emergency Department:  Medications   acetaminophen (TYLENOL) tablet 1,000 mg (1,000 mg Oral Given 9/21/23 1647)   sodium chloride 0.9 % bolus 1,000 mL (1,000 mL Intravenous New Bag 9/21/23 1647)   metoclopramide (REGLAN) injection 10 mg (10 mg Intravenous Given 9/21/23 1647)   diphenhydrAMINE (BENADRYL) injection 12.5 mg (12.5 mg Intravenous Given 9/21/23 1647)        ED Course:    The patient was initially evaluated in the triage area where orders were placed. The patient was later dispositioned by Rick Pearce PA-C.      The patient was advised to stay for completion of workup which includes but is not limited to communication of labs and radiological results, reassessment and plan. The patient was advised that leaving prior to disposition by a provider could result in critical findings that are not communicated to  the patient.     ED Course as of 09/21/23 1840   Thu Sep 21, 2023   1525 --- PROVIDER IN TRIAGE NOTE ---    The patient was evaluated by Rick resendiz in triage. Orders were placed and the patient is currently awaiting disposition.    [AJ]   1731 Specific Gravity, UA: 1.025 [MS]   1837 Patient rates her pain a 0 out of 10 now.  When she arrived it was 8/10 [AJ]      ED Course User Index  [AJ] Rick Pearce PA-C  [MS] Gissell Cotter, DILSHAD       Labs:    Lab Results (last 24 hours)       Procedure Component Value Units Date/Time    Urinalysis With Microscopic If Indicated (No Culture) - Urine, Clean Catch [[id number]]  (Abnormal) Collected: 09/20/23 1940    Specimen: Urine, Clean Catch Updated: 09/20/23 2023     Color, UA Yellow     Appearance, UA Turbid     pH, UA 7.5     Specific Gravity, UA 1.019     Glucose, UA Negative     Ketones, UA Negative     Bilirubin, UA Negative     Blood, UA Negative     Protein, UA Negative     Leuk Esterase, UA Small (1+)     Nitrite, UA Negative     Urobilinogen, UA 1.0 E.U./dL    Urinalysis, Microscopic Only - Urine, Clean Catch [[id number]]  (Abnormal) Collected: 09/20/23 1940    Specimen: Urine, Clean Catch Updated: 09/20/23 2023     RBC, UA 0-2 /HPF      WBC, UA 6-12 /HPF      Bacteria, UA 2+ /HPF      Squamous Epithelial Cells, UA 7-12 /HPF      Hyaline Casts, UA 0-2 /LPF      Methodology Automated Microscopy    CBC & Differential [[id number]]  (Abnormal) Collected: 09/20/23 1945    Specimen: Blood from Arm, Right Updated: 09/20/23 2050    Narrative:      The following orders were created for panel order CBC & Differential.  Procedure                               Abnormality         Status                     ---------                               -----------         ------                     CBC Auto Differential[[id number]]        Abnormal            Final result                 Please view results for these tests on the individual orders.    Comprehensive  Metabolic Panel [[id number]]  (Abnormal) Collected: 09/20/23 1945    Specimen: Blood from Arm, Right Updated: 09/20/23 2037     Glucose 98 mg/dL      BUN 6 mg/dL      Creatinine 0.56 mg/dL      Sodium 138 mmol/L      Potassium 3.6 mmol/L      Chloride 106 mmol/L      CO2 21.2 mmol/L      Calcium 9.2 mg/dL      Total Protein 6.2 g/dL      Albumin 3.4 g/dL      ALT (SGPT) 10 U/L      AST (SGOT) 11 U/L      Alkaline Phosphatase 56 U/L      Total Bilirubin 0.5 mg/dL      Globulin 2.8 gm/dL      A/G Ratio 1.2 g/dL      BUN/Creatinine Ratio 10.7     Anion Gap 10.8 mmol/L      eGFR 128.5 mL/min/1.73     Narrative:      GFR Normal >60  Chronic Kidney Disease <60  Kidney Failure <15      CBC Auto Differential [[id number]]  (Abnormal) Collected: 09/20/23 1945    Specimen: Blood from Arm, Right Updated: 09/20/23 2050     WBC 8.86 10*3/mm3      RBC 4.20 10*6/mm3      Hemoglobin 12.1 g/dL      Hematocrit 36.6 %      MCV 87.1 fL      MCH 28.8 pg      MCHC 33.1 g/dL      RDW 12.4 %      RDW-SD 39.3 fl      MPV 10.7 fL      Platelets 203 10*3/mm3      Neutrophil % 75.0 %      Lymphocyte % 17.5 %      Monocyte % 5.6 %      Eosinophil % 1.2 %      Basophil % 0.1 %      Immature Grans % 0.6 %      Neutrophils, Absolute 6.64 10*3/mm3      Lymphocytes, Absolute 1.55 10*3/mm3      Monocytes, Absolute 0.50 10*3/mm3      Eosinophils, Absolute 0.11 10*3/mm3      Basophils, Absolute 0.01 10*3/mm3      Immature Grans, Absolute 0.05 10*3/mm3      nRBC 0.0 /100 WBC     hCG, Quantitative, Pregnancy [[id number]] Collected: 09/20/23 1945    Specimen: Blood from Arm, Right Updated: 09/20/23 2250     HCG Quantitative 4,507.00 mIU/mL     Narrative:      HCG Ranges by Gestational Age    Females - non-pregnant premenopausal   </= 1mIU/mL HCG  Females - postmenopausal               </= 7mIU/mL HCG    3 Weeks       5.4   -      72 mIU/mL  4 Weeks      10.2   -     708 mIU/mL  5 Weeks       217   -   8,245 mIU/mL  6 Weeks       152   -  32,177 mIU/mL  7  Weeks     4,059   - 153,767 mIU/mL  8 Weeks    31,366   - 149,094 mIU/mL  9 Weeks    59,109   - 135,901 mIU/mL  10 Weeks   44,186   - 170,409 mIU/mL  12 Weeks   27,107   - 201,615 mIU/mL  14 Weeks   24,302   -  93,646 mIU/mL  15 Weeks   12,540   -  69,747 mIU/mL  16 Weeks    8,904   -  55,332 mIU/mL  17 Weeks    8,240   -  51,793 mIU/mL  18 Weeks    9,649   -  55,271 mIU/mL      Comprehensive Metabolic Panel [[id number]]  (Abnormal) Collected: 09/21/23 1537    Specimen: Blood Updated: 09/21/23 1558     Glucose 119 mg/dL      BUN 6 mg/dL      Creatinine 0.60 mg/dL      Sodium 136 mmol/L      Potassium 3.5 mmol/L      Chloride 104 mmol/L      CO2 18.2 mmol/L      Calcium 9.6 mg/dL      Total Protein 6.8 g/dL      Albumin 3.6 g/dL      ALT (SGPT) 10 U/L      AST (SGOT) 12 U/L      Alkaline Phosphatase 61 U/L      Total Bilirubin 0.6 mg/dL      Globulin 3.2 gm/dL      A/G Ratio 1.1 g/dL      BUN/Creatinine Ratio 10.0     Anion Gap 13.8 mmol/L      eGFR 126.3 mL/min/1.73     Narrative:      GFR Normal >60  Chronic Kidney Disease <60  Kidney Failure <15      CBC & Differential [[id number]]  (Abnormal) Collected: 09/21/23 1537    Specimen: Blood Updated: 09/21/23 1541    Narrative:      The following orders were created for panel order CBC & Differential.  Procedure                               Abnormality         Status                     ---------                               -----------         ------                     CBC Auto Differential[[id number]]        Abnormal            Final result                 Please view results for these tests on the individual orders.    CBC Auto Differential [[id number]]  (Abnormal) Collected: 09/21/23 1537    Specimen: Blood Updated: 09/21/23 1541     WBC 8.63 10*3/mm3      RBC 4.57 10*6/mm3      Hemoglobin 13.1 g/dL      Hematocrit 38.9 %      MCV 85.1 fL      MCH 28.7 pg      MCHC 33.7 g/dL      RDW 12.4 %      RDW-SD 38.2 fl      MPV 10.4 fL      Platelets 214 10*3/mm3       Neutrophil % 79.3 %      Lymphocyte % 14.0 %      Monocyte % 5.2 %      Eosinophil % 1.0 %      Basophil % 0.2 %      Immature Grans % 0.3 %      Neutrophils, Absolute 6.83 10*3/mm3      Lymphocytes, Absolute 1.21 10*3/mm3      Monocytes, Absolute 0.45 10*3/mm3      Eosinophils, Absolute 0.09 10*3/mm3      Basophils, Absolute 0.02 10*3/mm3      Immature Grans, Absolute 0.03 10*3/mm3      nRBC 0.0 /100 WBC     Urinalysis With Microscopic If Indicated (No Culture) - Urine, Clean Catch [[id number]]  (Abnormal) Collected: 09/21/23 1635    Specimen: Urine, Clean Catch Updated: 09/21/23 1650     Color, UA Yellow     Appearance, UA Turbid     pH, UA 6.0     Specific Gravity, UA 1.025     Glucose, UA Negative     Ketones, UA Trace     Bilirubin, UA Negative     Blood, UA Negative     Protein, UA Negative     Leuk Esterase, UA Moderate (2+)     Nitrite, UA Negative     Urobilinogen, UA 0.2 E.U./dL    Urinalysis, Microscopic Only - Urine, Clean Catch [[id number]]  (Abnormal) Collected: 09/21/23 1635    Specimen: Urine, Clean Catch Updated: 09/21/23 1650     RBC, UA 0-2 /HPF      WBC, UA Too Numerous to Count /HPF      Bacteria, UA 4+ /HPF      Squamous Epithelial Cells, UA 13-20 /HPF      Hyaline Casts, UA 3-6 /LPF      Methodology Automated Microscopy             Imaging:    No Radiology Exams Resulted Within Past 24 Hours      Differential Diagnosis and Discussion:      Headache: Differential diagnosis includes but is not limited to migraine, cluster headache, hypertension, tumor, subarachnoid bleeding, pseudotumor cerebri, temporal arteritis, infections, tension headache, and TMJ syndrome.    All labs were reviewed and interpreted by me.    MDM           Patient Care Considerations:    CT HEAD: I considered ordering a noncontrast CT of the head, however patient is pregnant and headache symptoms.      Consultants/Shared Management Plan:    None    Social Determinants of Health:    Patient is independent, reliable, and has  access to care.       Disposition and Care Coordination:    Discharged: The patient is suitable and stable for discharge with no need for consideration of observation or admission.    I have explained the patient´s condition, diagnoses and treatment plan based on the information available to me at this time. I have answered questions and addressed any concerns. The patient has a good  understanding of the patient´s diagnosis, condition, and treatment plan as can be expected at this point. The vital signs have been stable. The patient´s condition is stable and appropriate for discharge from the emergency department.      The patient will pursue further outpatient evaluation with the primary care physician or other designated or consulting physician as outlined in the discharge instructions. They are agreeable to this plan of care and follow-up instructions have been explained in detail. The patient has received these instructions in written format and have expressed an understanding of the discharge instructions. The patient is aware that any significant change in condition or worsening of symptoms should prompt an immediate return to this or the closest emergency department or call to 911.  I have explained discharge medications and the need for follow up with the patient/caretakers. This was also printed in the discharge instructions. Patient was discharged with the following medications and follow up:      Medication List        New Prescriptions      cephalexin 500 MG capsule  Commonly known as: KEFLEX  Take 1 capsule by mouth 4 (Four) Times a Day for 7 days.     metoclopramide 5 MG tablet  Commonly known as: REGLAN  Take 2 tablets by mouth 3 (Three) Times a Day As Needed (nausea).               Where to Get Your Medications        These medications were sent to [id number] - [place], [place] - [address] AT [facility] [phone] [facility] [phone] FX  [address],  [place] [address]      Phone: [phone]   cephalexin 500 MG capsule  metoclopramide 5 MG tablet      No follow-up provider specified.     Final diagnoses:   Nonintractable headache, unspecified chronicity pattern, unspecified headache type   Acute cystitis without hematuria        ED Disposition       ED Disposition   Discharge    Condition   Stable    Comment   --               This medical record created using voice recognition software.             Rick Pearce PA-C  09/21/23 3987

## 2023-09-21 NOTE — TELEPHONE ENCOUNTER
----- Message from Shruti Pearl sent at 9/20/2023 11:52 AM EDT -----  Regarding: Headache since Monday night 9/18  Contact: [phone]  Hi Dr. Orta I’m scheduled to see you next week, I’ve tried calling the office for advice but can’t seem to get a hold of anyone. I’ve had a headache going since Monday night. I’ve taken Tylenol but it only seems to lessen the headache but it’s still there. I’ve drunk plenty of water and just seem to get no where with getting rid of this headache. Do you have any advice or ways I could get rid of the headache?    Thanks!

## 2023-09-26 ENCOUNTER — ROUTINE PRENATAL (OUTPATIENT)
Dept: OBSTETRICS AND GYNECOLOGY | Facility: CLINIC | Age: 28
End: 2023-09-26
Payer: COMMERCIAL

## 2023-09-26 VITALS — SYSTOLIC BLOOD PRESSURE: 112 MMHG | WEIGHT: 222 LBS | DIASTOLIC BLOOD PRESSURE: 78 MMHG | BODY MASS INDEX: 38.11 KG/M2

## 2023-09-26 DIAGNOSIS — O09.899 RUBELLA NON-IMMUNE STATUS, ANTEPARTUM: ICD-10-CM

## 2023-09-26 DIAGNOSIS — Z28.39 RUBELLA NON-IMMUNE STATUS, ANTEPARTUM: ICD-10-CM

## 2023-09-26 DIAGNOSIS — R09.81 CONGESTION OF NASAL SINUS: ICD-10-CM

## 2023-09-26 DIAGNOSIS — Z34.80 SUPERVISION OF OTHER NORMAL PREGNANCY, ANTEPARTUM: Primary | ICD-10-CM

## 2023-09-26 LAB
DEPRECATED RDW RBC AUTO: 35.7 FL (ref 37–54)
ERYTHROCYTE [DISTWIDTH] IN BLOOD BY AUTOMATED COUNT: 11.7 % (ref 12.3–15.4)
GLUCOSE 1H P GLC SERPL-MCNC: 152 MG/DL (ref 65–139)
GLUCOSE UR STRIP-MCNC: NEGATIVE MG/DL
HCT VFR BLD AUTO: 36.3 % (ref 34–46.6)
HGB BLD-MCNC: 12.1 G/DL (ref 12–15.9)
MCH RBC QN AUTO: 28.5 PG (ref 26.6–33)
MCHC RBC AUTO-ENTMCNC: 33.3 G/DL (ref 31.5–35.7)
MCV RBC AUTO: 85.6 FL (ref 79–97)
PLATELET # BLD AUTO: 192 10*3/MM3 (ref 140–450)
PMV BLD AUTO: 11.1 FL (ref 6–12)
PROT UR STRIP-MCNC: NEGATIVE MG/DL
RBC # BLD AUTO: 4.24 10*6/MM3 (ref 3.77–5.28)
WBC NRBC COR # BLD: 10.19 10*3/MM3 (ref 3.4–10.8)

## 2023-09-26 PROCEDURE — 85027 COMPLETE CBC AUTOMATED: CPT | Performed by: OBSTETRICS & GYNECOLOGY

## 2023-09-26 PROCEDURE — 82950 GLUCOSE TEST: CPT | Performed by: OBSTETRICS & GYNECOLOGY

## 2023-09-26 RX ORDER — ECHINACEA PURPUREA EXTRACT 125 MG
1 TABLET ORAL AS NEEDED
Qty: 15 ML | Refills: 0 | Status: SHIPPED | OUTPATIENT
Start: 2023-09-26

## 2023-09-26 NOTE — PROGRESS NOTES
OB FOLLOW UP  Complaint   Chief Complaint   Patient presents with    Routine Prenatal Visit            Shruti Pearl is a 28 y.o.  26w1d patient being seen today for her obstetrical follow up visit. Patient denies decreased fetal movement, contractions, loss of fluid or vaginal bleeding.  Patient was evaluated in urgent care last week for urinary tract infection she is currently taking the remainder of her Keflex prescription.  Denies any pain with urination.  Patient has been having a common cold over the last couple days.  Denies any fevers or chills.  Experiencing nasal congestion.  Has been attempting to use Benadryl to assist.  No other acute complaints.    Her prenatal care is complicated by (and status) :    Patient Active Problem List   Diagnosis    Bipolar II disorder    ADHD (attention deficit hyperactivity disorder), combined type    Post traumatic stress disorder (PTSD)    Marijuana use    Cervical high risk HPV (human papillomavirus) test positive    Supervision of other normal pregnancy, antepartum    Previous  section    Hx of  delivery, currently pregnant    Rubella non-immune status, antepartum    History of placental abruption    Desires  (vaginal birth after ) trial       All other systems reviewed and are negative.     The additional following portions of the patient's history were reviewed and updated as appropriate: allergies, current medications, past family history, past medical history, past social history, past surgical history, and problem list.      EXAM:     Vital signs: /78   Wt 101 kg (222 lb)   LMP 2023 (Exact Date)   BMI 38.11 kg/m²   Appearance/psychiatric: To be in no distress  Constitutional: The patient is well nourished.  Cardiovascular: She does not have edema.  Respiratory: Respiratory effort is normal.  Gastrointestinal: Abdomen is soft, gravid, nontender, no rashes, heart tones are present, fundal height is size equals  dates    Pelvic Exam: No    Urine glucose/protein: See prenatal flowsheet       Assessment and Plan    Problem List Items Addressed This Visit          Gravid and     Supervision of other normal pregnancy, antepartum - Primary    Overview     TALIB finalized: 24 per 8-week US    Genetic testing (NIPS-Quad)/CF/AFP:  Discussed all,  CF/SMA negative, NIPS negative    COVID: Fully vaccinated  Flu:recommended   Tdap:Script 27-36 weeks     Rhogam: B positive  ? Desires Sterilization:    Anatomy US:Cervical Length WNL 5cm, All anatomy seen and WNL, Growtyh 56%, AC 68%, 3VC-unable to see cord insertion, follow up with next cervical length, vertex, anterior placenta-grade 1, boy   FU US:    PROBLEM LIST/PLAN:   Hx of  labor/delivery:   labor at 31 and 33 weeks with first pregnancy, delivered at 35w5d due to abruption  Previous  - unsure if wants to have a TOLAC- 52%, recommend repeat   Obesity-BMI >35- testing 37 weeks   Bipolar 2-NO meds, stable   Rubella nonimmune-MMR postpartum          Relevant Orders    POC Urinalysis Dipstick (Completed)       Impression  Pregnancy at 26w1d  Fetal status reassuring.   Activity and Exercise discussed.    Plan  Second trimester labs including 1 hour glucose tolerance testing and CBC today  [place] nasal spray to pharmacy for nasal congestion  Discussion regarding TOLAC.  Recommendation for repeat  with the  calculator 52%.  Return to office in 2 weeks      Patient was counseled to the following pregnancy precautions:  Decreased fetal movement, if concern for decreased fetal movement please perform fetal kick counts you are looking for 10 movements in 2 hours.  If concern for fetal movement and not meeting that criteria, please present to triage for evaluation.  Contractions occurring every 5 minutes for over an hour, lasting 30 to 60 seconds and progressively causing more discomfort, please seek medical attention to rule out  labor  If you believe that your water is broken, place a sanitary pad.  If pad fills in short period of time i.e. less than 5 minutes, take off pad placed another pad.  If this is saturated please present for rule out rupture of membranes  Vaginal bleeding can be normal in pregnancy, this usually takes a form of spotting.  If having heavier bleeding like a menstrual period please present for evaluation; especially in light of severe abdominal pain this could represent a placental abruption.  Keep all scheduled appointments as recommended.        Vargas Orta MD  09/26/2023

## 2023-09-26 NOTE — PATIENT INSTRUCTIONS
Venipuncture Blood Specimen Collection  Venipuncture performed in right arm by Deborah Qureshi with good hemostasis. Patient tolerated the procedure well without complications.   09/26/23   Deborah Qureshi

## 2023-09-28 ENCOUNTER — TELEPHONE (OUTPATIENT)
Dept: OBSTETRICS AND GYNECOLOGY | Facility: CLINIC | Age: 28
End: 2023-09-28

## 2023-09-28 ENCOUNTER — HOSPITAL ENCOUNTER (OUTPATIENT)
Facility: HOSPITAL | Age: 28
Discharge: HOME | End: 2023-09-28
Attending: OBSTETRICS & GYNECOLOGY | Admitting: OBSTETRICS & GYNECOLOGY
Payer: COMMERCIAL

## 2023-09-28 VITALS
RESPIRATION RATE: 18 BRPM | HEART RATE: 99 BPM | OXYGEN SATURATION: 99 % | SYSTOLIC BLOOD PRESSURE: 113 MMHG | DIASTOLIC BLOOD PRESSURE: 77 MMHG

## 2023-09-28 LAB
BILIRUB UR QL STRIP: ABNORMAL
CLARITY UR: ABNORMAL
COLOR UR: ABNORMAL
GLUCOSE UR STRIP-MCNC: ABNORMAL MG/DL
HGB UR QL STRIP.AUTO: NEGATIVE
KETONES UR QL STRIP: ABNORMAL
LEUKOCYTE ESTERASE UR QL STRIP.AUTO: NEGATIVE
NITRITE UR QL STRIP: NEGATIVE
PH UR STRIP.AUTO: 5.5 [PH] (ref 5–8)
PROT UR QL STRIP: ABNORMAL
SP GR UR STRIP: >1.03 (ref 1–1.03)
UROBILINOGEN UR QL STRIP: ABNORMAL

## 2023-09-28 PROCEDURE — 87086 URINE CULTURE/COLONY COUNT: CPT | Performed by: OBSTETRICS & GYNECOLOGY

## 2023-09-28 PROCEDURE — G0463 HOSPITAL OUTPT CLINIC VISIT: HCPCS

## 2023-09-28 PROCEDURE — 81003 URINALYSIS AUTO W/O SCOPE: CPT | Performed by: OBSTETRICS & GYNECOLOGY

## 2023-09-28 NOTE — TELEPHONE ENCOUNTER
Spoke with Anthony and advised since patient is experiencing very decreased fetal movement she needs to be evaluated at L&D. Voiced understanding.

## 2023-09-28 NOTE — TELEPHONE ENCOUNTER
Caller: ALEJANDRINA ELDER    Relationship to patient: BOYFRIEND IN  [place]     Best call back number: [phone] CALL BACK ANYTIME AND CAN LVM        DECREASED FETAL MOVEMENT ONLY 5 KICKS EVERY TWO HOURS.  NO FETAL MOVEMENT ALL MORNING UNTIL LUNCHTIME.  THANK YOU.        UNABLE TO WT CALL.

## 2023-09-28 NOTE — H&P
ANNITA Rothman  Obstetric History and Physical    Chief Complaint   Patient presents with    Back Pain    Decreased Fetal Movement       Subjective     HPI:    Patient is a 28 y.o. female  currently at 26w3d, who presents with LBP and decreased FM since yesterday;  no ctx, LOF, vb  .    Her prenatal care is complicated by  prior   desires repeat .  Her previous obstetric/gynecological history is noted for is non-contributory.    The following portions of the patients history were reviewed and updated as appropriate:   current medications, allergies, past medical history, past surgical history, past family history, past social history and current problem list.     Prenatal Information:  Prenatal Results       Initial Prenatal Labs       Test Value Reference Range Date Time    Hemoglobin  13.8 g/dL 12.0 - 15.9 23 1557       14.2 g/dL 12.0 - 15.9 06/15/23 1352    Hematocrit  38.4 % 34.0 - 46.6 23 1557       40.5 % 34.0 - 46.6 06/15/23 1352    Platelets  194 10*3/mm3 140 - 450 23 1557       208 10*3/mm3 140 - 450 06/15/23 1352    Rubella IgG  <0.90 index Immune >0.99 06/15/23 1352    Hepatitis B SAg  Non-Reactive  Non-Reactive 06/15/23 1352    Hepatitis C Ab  Non-Reactive  Non-Reactive 06/15/23 1352    RPR  NON-REACTIVE NA  20    T. Pallidum Ab   Non-Reactive  Non-Reactive 06/15/23 1352    ABO  B   06/15/23 1352    Rh  Positive   06/15/23 1352    Antibody Screen  Negative   06/15/23 1352    HIV  Non-Reactive  Non-Reactive 06/15/23 1352    Urine Culture  No growth   23 1633       No growth   06/15/23 1336    Gonorrhea  Negative  Negative 06/15/23 1336    Chlamydia  Negative  Negative 06/15/23 1336    TSH  1.800 uIU/mL 0.270 - 4.200 23 0837    HgB A1c         Varicella IgG        HgB Electrophoresis         Cystic fibrosis                   Fetal testing        Test Value Reference Range Date Time    NIPT        MSAFP        AFP-4                  2nd and 3rd  Trimester       Test Value Reference Range Date Time    Hemoglobin (repeated)  12.1 g/dL 12.0 - 15.9 09/26/23 0957       13.1 g/dL 12.0 - 15.9 09/21/23 1537       12.1 g/dL 12.0 - 15.9 09/20/23 1945    Hematocrit (repeated)  36.3 % 34.0 - 46.6 09/26/23 0957       38.9 % 34.0 - 46.6 09/21/23 1537       36.6 % 34.0 - 46.6 09/20/23 1945    Platelets   192 10*3/mm3 140 - 450 09/26/23 0957       214 10*3/mm3 140 - 450 09/21/23 1537       203 10*3/mm3 140 - 450 09/20/23 1945       194 10*3/mm3 140 - 450 07/11/23 1557       208 10*3/mm3 140 - 450 06/15/23 1352    GCT  152 mg/dL 65 - 139 09/26/23 0957    Antibody Screen (repeated)  Negative   06/15/23 1352    GTT Fasting        GTT 1 Hr        GTT 2 Hr        GTT 3 Hr        Group B Strep                  Other testing        Test Value Reference Range Date Time    Parvo IgG         CMV IgG                   Drug Screening       Test Value Reference Range Date Time    Amphetamine Screen        Barbiturate Screen        Benzodiazepine Screen        Methadone Screen        Phencyclidine Screen        Opiates Screen        THC Screen        Cocaine Screen        Propoxyphene Screen        Buprenorphine Screen        Methamphetamine Screen        Oxycodone Screen        Tricyclic Antidepressants Screen                  Legend    ^: Historical                          External Prenatal Results       Pregnancy Outside Results - Transcribed From Office Records - See Scanned Records For Details       Test Value Date Time    ABO  B  06/15/23 1352    Rh  Positive  06/15/23 1352    Antibody Screen  Negative  06/15/23 1352    Varicella IgG       Rubella  <0.90 index 06/15/23 1352    Hgb  12.1 g/dL 09/26/23 0957       13.1 g/dL 09/21/23 1537       12.1 g/dL 09/20/23 1945       13.8 g/dL 07/11/23 1557       14.2 g/dL 06/15/23 1352    Hct  36.3 % 09/26/23 0957       38.9 % 09/21/23 1537       36.6 % 09/20/23 1945       38.4 % 07/11/23 1557       40.5 % 06/15/23 1352    Glucose Fasting GTT        Glucose Tolerance Test 1 hour       Glucose Tolerance Test 3 hour       Gonorrhea (discrete)  Negative  06/15/23 1336    Chlamydia (discrete)  Negative  06/15/23 1336    RPR  NON-REACTIVE NA 20    VDRL       Syphilis Antibody       HBsAg  Non-Reactive  06/15/23 1352    Herpes Simplex Virus PCR       Herpes Simplex VIrus Culture       HIV  Non-Reactive  06/15/23 1352    Hep C RNA Quant PCR       Hep C Antibody  Non-Reactive  06/15/23 1352    AFP       Group B Strep       GBS Susceptibility to Clindamycin       GBS Susceptibility to Erythromycin       Fetal Fibronectin       Genetic Testing, Maternal Blood                 Drug Screening       Test Value Date Time    Urine Drug Screen       Amphetamine Screen       Barbiturate Screen  Negative  23 0803    Benzodiazepine Screen  Negative  23 0803    Methadone Screen  Negative  23 0803    Phencyclidine Screen       Opiates Screen  Negative  23 0803    THC Screen  Positive  23 0803    Cocaine Screen       Propoxyphene Screen       Buprenorphine Screen       Methamphetamine Screen       Oxycodone Screen  Negative  23 0803    Tricyclic Antidepressants Screen                 Legend    ^: Historical                             Past OB History:     OB History    Para Term  AB Living   3 1 0 1 1 1   SAB IAB Ectopic Molar Multiple Live Births   0 0 1 0 0 1      # Outcome Date GA Lbr Olegario/2nd Weight Sex Delivery Anes PTL Lv   3 Current            2 Ectopic 2018 8w0d    ECTOPIC      1  14 35w5d  2929 g (6 lb 7.3 oz) M CS-LTranv Spinal Y CINDY      Apgar1: 6  Apgar5: 9       Past Medical History: Past Medical History:   Diagnosis Date    Allergies     Anxiety     Asthma     Bipolar disorder     Depression     Kidney stone     Panic disorder     Placental abruption 2014    Premature labor after 22 weeks and before 37 weeks without delivery 2014    PTSD (post-traumatic stress disorder)     Status  post  delivery 2014      Past Surgical History Past Surgical History:   Procedure Laterality Date     SECTION        Family History: Family History   Problem Relation Age of Onset    Depression Mother     Bipolar disorder Mother     ADD / ADHD Mother     Depression Father     ADD / ADHD Father     Suicide Attempts Sister     Anxiety disorder Sister     ADD / ADHD Sister     Diabetes Maternal Grandfather     Diabetes Other         UNCLE    ADD / ADHD Son       Social History:  reports that she has quit smoking. Her smoking use included cigarettes. She has a 8.00 pack-year smoking history. She has been exposed to tobacco smoke. She has never used smokeless tobacco.   reports that she does not currently use alcohol.   reports that she does not currently use drugs after having used the following drugs: Marijuana.        General ROS: Pertinent items are noted in HPI  Home Medications:  busPIRone, cephalexin, escitalopram, metoclopramide, prenatal vitamin, and sodium chloride    Allergies:  Allergies   Allergen Reactions    Erythromycin Unknown - Low Severity    Nitrofurantoin Macrocrystal Hives    Penicillins Hives       Objective       Vital Signs Range for the last 24 hours:  AFVSS  Temperature:     Temp Source:     BP:     Pulse:     Respirations:     SPO2:       Physical Examination:   General appearance - alert, well appearing, and in no distress  Mental status - alert, oriented to person, place, and time  Abdomen - soft, nontender, nondistended,   Back exam - full range of motion, no tenderness or pain on motion  Neurological - alert, oriented, normal speech  Extremities - peripheral pulses normal  Skin - normal coloration and turgor, no suspicious skin lesions noted    Presentation: unknown   Cervix:     Dilation:     Effacement:     Station:         Fetal Heart Rate Assessment :  140s, GLTV, cat 1  Method:     Beats/min:     Baseline:     Variability:     Accels:     Decels:     Tracing  Category:       Uterine Assessment   Method:     Frequency (min):     Ctx Count in 10 min:     Duration:     Intensity:     Harlan Units:       GBS is unknown     Assessment & Plan     Decreased FM    Assessment:  1.  Intrauterine pregnancy at 26w3d gestation with reassuring fetal status.    2.  decreased fetal movement  3.  Obstetrical history significant for is non-contributory and is remarkable for h/o CS  4.  GBS status: No results found for: STREPGPB    Plan:  1. Discharge to home.  Reassurance given to pt;  pt with UTI on end of keflex but still with symptoms;  will send culture  2.  Plan of care has been reviewed with patient and patient agrees.   3.  Risks, benefits of treatment plan have been discussed.  4.  All questions have been answered.        Electronically signed by Marisol Low MD, 09/28/23, 7:21 PM EDT.

## 2023-09-29 NOTE — NON STRESS TEST
Obstetrical Non-stress Test Interpretation     Name:  Shruti Pearl  MRN: [id number]    28 y.o. female  at 26w3d    Indication: decreased fetal movement, UTI symptoms       Fetal Assessment  Fetal Movement: active  Fetal HR Assessment Method: external  Fetal HR (beats/min): 135  Fetal HR Baseline: normal range  Fetal HR Variability: moderate (amplitude range 6 to 25 bpm)    /77 (BP Location: Right arm)   Pulse 99   Resp 18   LMP 2023 (Exact Date)   SpO2 99%     Reason for test: OB Triage, Decreased fetal movement  Date of Test: 2023  Time frame of test: 3939-9365  RN NST Interpretation:  (appropriate for gestational age)      Kelsie Gunn RN  2023  20:48 EDT

## 2023-09-30 ENCOUNTER — TELEPHONE (OUTPATIENT)
Dept: OBSTETRICS AND GYNECOLOGY | Facility: HOSPITAL | Age: 28
End: 2023-09-30
Payer: COMMERCIAL

## 2023-09-30 LAB — BACTERIA SPEC AEROBE CULT: NO GROWTH

## 2023-09-30 NOTE — TELEPHONE ENCOUNTER
Called patient with urine culture results which were negative.  Questions answered.  Return for any problems fever contractions or other difficulties

## 2023-10-02 PROBLEM — R73.09 ABNORMAL GTT (GLUCOSE TOLERANCE TEST): Status: ACTIVE | Noted: 2023-10-02

## 2023-10-03 ENCOUNTER — TELEPHONE (OUTPATIENT)
Dept: OBSTETRICS AND GYNECOLOGY | Facility: CLINIC | Age: 28
End: 2023-10-03
Payer: COMMERCIAL

## 2023-10-03 DIAGNOSIS — R73.09 ABNORMAL GTT (GLUCOSE TOLERANCE TEST): Primary | ICD-10-CM

## 2023-10-03 NOTE — TELEPHONE ENCOUNTER
Patient informed of her 1 hour GTT results. She is scheduled 10/6. Please sign the attached order.

## 2023-10-03 NOTE — TELEPHONE ENCOUNTER
----- Message from Leslie Kessler DO sent at 10/2/2023  8:31 AM EDT -----  Failed 1 hr GTT, needs 3 hr ordered and scheduled.     Electronically signed by:    Leslie Kessler DO  10/02/23  08:31 EDT

## 2023-10-06 ENCOUNTER — LAB (OUTPATIENT)
Dept: LAB | Facility: HOSPITAL | Age: 28
End: 2023-10-06
Payer: COMMERCIAL

## 2023-10-06 DIAGNOSIS — R73.09 ABNORMAL GTT (GLUCOSE TOLERANCE TEST): ICD-10-CM

## 2023-10-06 LAB
GLUCOSE BLDC GLUCOMTR-MCNC: 79 MG/DL (ref 70–99)
GLUCOSE P FAST SERPL-MCNC: 74 MG/DL (ref 65–94)
GTT GEST 2H PNL UR+SERPL: 153 MG/DL (ref 65–179)
GTT GEST 3H PNL SERPL: 118 MG/DL (ref 65–154)
GTT GEST 3H PNL SERPL: 82 MG/DL (ref 65–139)

## 2023-10-06 PROCEDURE — 82952 GTT-ADDED SAMPLES: CPT

## 2023-10-06 PROCEDURE — 82951 GLUCOSE TOLERANCE TEST (GTT): CPT

## 2023-10-06 PROCEDURE — 36415 COLL VENOUS BLD VENIPUNCTURE: CPT

## 2023-10-11 NOTE — PROGRESS NOTES
Routine Prenatal Visit     Subjective  Shruti Pearl is a 28 y.o.  at 28w3d here for her routine OB visit.   She is taking her prenatal vitamins.Reports no loss of fluid or vaginal bleeding. Patient doing well without any complaints. Pregnancy complicated by:     Patient Active Problem List   Diagnosis    Bipolar II disorder    ADHD (attention deficit hyperactivity disorder), combined type    Post traumatic stress disorder (PTSD)    Marijuana use    Cervical high risk HPV (human papillomavirus) test positive    Supervision of other normal pregnancy, antepartum    Previous  section    Hx of  delivery, currently pregnant    Rubella non-immune status, antepartum    History of placental abruption    Desires  (vaginal birth after ) trial    Abnormal GTT (glucose tolerance test)         OB History    Para Term  AB Living   3 1   1 1 1   SAB IAB Ectopic Molar Multiple Live Births       1     1      # Outcome Date GA Lbr Olegario/2nd Weight Sex Delivery Anes PTL Lv   3 Current            2 Ectopic 2018 8w0d    ECTOPIC      1  14 35w5d  2929 g (6 lb 7.3 oz) M CS-LTranv Spinal Y CINDY           ROS:   General ROS: negative for - chills or fatigue  Respiratory ROS: negative for - cough or hemoptysis  Cardiovascular ROS: negative for - chest pain or dyspnea on exertion  Genito-Urinary ROS: negative for  change in urinary stream, vaginal discharge   Musculoskeletal ROS: negative for - gait disturbance or joint pain  Dermatological ROS: negative for acne,  dry skin or itching    Objective  Physical Exam:   Vitals:    10/12/23 0821   BP: 130/86       Uterine Size: size equals dates  FHT: 110-160 BPM    General appearance - alert, well appearing, and in no distress  Mental status - alert, oriented to person, place, and time  Abdomen- Soft, Gravid uterus, non-tender to palpation  Musculoskeletal: negative for - gait disturbance or joint pain  Extremeties: negative swelling  or cyanosis   Dermatological: negative rashes or skin lesions       Assessment/Plan:   Diagnoses and all orders for this visit:    1. Supervision of other normal pregnancy, antepartum (Primary)  Assessment & Plan:  TALIB finalized: 24 per 8-week US    Genetic testing (NIPS-Quad)/CF/AFP:  Discussed all,  CF/SMA negative, NIPS negative    COVID: Fully vaccinated  Flu:recommended   Tdap:Script 27-36 weeks     Rhogam: B positive  ? Desires Sterilization:    Anatomy US:Cervical Length WNL 5cm, All anatomy seen and WNL, Growtyh 56%, AC 68%, 3VC-unable to see cord insertion, follow up with next cervical length, vertex, anterior placenta-grade 1, boy   FU US:    PROBLEM LIST/PLAN:   Hx of  labor/delivery:   labor at 31 and 33 weeks with first pregnancy, delivered at 35w5d due to abruption  Previous  - unsure if wants to have a TOLAC-  82%  Obesity-BMI >35- testing 37 weeks   Bipolar 2-NO meds, stable   Rubella nonimmune-MMR postpartum     Orders:  -     POC Urinalysis Dipstick    2. Marijuana use    3. Abnormal GTT (glucose tolerance test)    4. History of placental abruption    5. Hx of  delivery, currently pregnant    6. Previous  section    7. Rubella non-immune status, antepartum            Counseling:   OB precautions, leaking, VB, radha acosta vs PTL/Labor  FKC  HTN precautions reviewed: HA, vision change, RUQ/epigastric pain, edema  Round Ligament Pain:  The uterus has several ligaments which provide support and keep the uterus in place. As the  uterus grows these ligaments are pulled and stretched which often causes sharp stabbing like pain in the inguinal area.   You may find a pregnancy support band helpful. Changing positions may also help. Yoga is a great way to cope with round ligament and low back pain in pregnancy.    Massage may also help with low back pain   Things to Consider at this Point in your Pregnancy:  Some women experience swelling in their feet  during pregnancy. Compression stockings may help  Drink plenty of water and stay active   Make sure you are eating frequent small meals, nuts are a wonderful snack to keep with you            Return in about 2 weeks (around 10/26/2023) for Routine OB visit.      We have gone over prenatal care to include the timing and content of visits. I informed her how to contact the office and/or on call person in the event of any problems and encouraged her to do so when she feels it is necessary.  We then spent time answering her questions which she indicated were answered to her satisfaction.    Leslie Kessler DO  10/12/2023 08:44 EDT

## 2023-10-12 ENCOUNTER — ROUTINE PRENATAL (OUTPATIENT)
Dept: OBSTETRICS AND GYNECOLOGY | Facility: CLINIC | Age: 28
End: 2023-10-12
Payer: COMMERCIAL

## 2023-10-12 VITALS — SYSTOLIC BLOOD PRESSURE: 130 MMHG | BODY MASS INDEX: 39 KG/M2 | WEIGHT: 227.2 LBS | DIASTOLIC BLOOD PRESSURE: 86 MMHG

## 2023-10-12 DIAGNOSIS — O09.899 RUBELLA NON-IMMUNE STATUS, ANTEPARTUM: ICD-10-CM

## 2023-10-12 DIAGNOSIS — Z28.39 RUBELLA NON-IMMUNE STATUS, ANTEPARTUM: ICD-10-CM

## 2023-10-12 DIAGNOSIS — F12.90 MARIJUANA USE: ICD-10-CM

## 2023-10-12 DIAGNOSIS — Z87.59 HISTORY OF PLACENTAL ABRUPTION: ICD-10-CM

## 2023-10-12 DIAGNOSIS — O09.899 HX OF PRETERM DELIVERY, CURRENTLY PREGNANT: ICD-10-CM

## 2023-10-12 DIAGNOSIS — Z98.891 PREVIOUS CESAREAN SECTION: ICD-10-CM

## 2023-10-12 DIAGNOSIS — R73.09 ABNORMAL GTT (GLUCOSE TOLERANCE TEST): ICD-10-CM

## 2023-10-12 DIAGNOSIS — Z34.80 SUPERVISION OF OTHER NORMAL PREGNANCY, ANTEPARTUM: Primary | ICD-10-CM

## 2023-10-12 LAB
GLUCOSE UR STRIP-MCNC: NEGATIVE MG/DL
PROT UR STRIP-MCNC: NEGATIVE MG/DL

## 2023-10-22 NOTE — PROGRESS NOTES
Routine Prenatal Visit     Subjective  Shruti Pearl is a 28 y.o.  at 30w0d here for her routine OB visit.   She is taking her prenatal vitamins.Reports no loss of fluid or vaginal bleeding. Patient doing well without any complaints. Pregnancy complicated by:     Patient Active Problem List   Diagnosis    Bipolar II disorder    ADHD (attention deficit hyperactivity disorder), combined type    Post traumatic stress disorder (PTSD)    Marijuana use    Cervical high risk HPV (human papillomavirus) test positive    Supervision of other normal pregnancy, antepartum    Previous  section    Hx of  delivery, currently pregnant    Rubella non-immune status, antepartum    History of placental abruption    Desires  (vaginal birth after ) trial    Abnormal GTT (glucose tolerance test)         OB History    Para Term  AB Living   3 1   1 1 1   SAB IAB Ectopic Molar Multiple Live Births       1     1      # Outcome Date GA Lbr Olegario/2nd Weight Sex Delivery Anes PTL Lv   3 Current            2 Ectopic 2018 8w0d    ECTOPIC      1  14 35w5d  2929 g (6 lb 7.3 oz) M CS-LTranv Spinal Y CINDY           ROS:   General ROS: negative for - chills or fatigue  Respiratory ROS: negative for - cough or hemoptysis  Cardiovascular ROS: negative for - chest pain or dyspnea on exertion  Genito-Urinary ROS: negative for  change in urinary stream, vaginal discharge   Musculoskeletal ROS: negative for - gait disturbance or joint pain  Dermatological ROS: negative for acne,  dry skin or itching    Objective  Physical Exam:   Vitals:    10/23/23 1012   BP: 104/80       Uterine Size: size equals dates  FHT: 110-160 BPM    General appearance - alert, well appearing, and in no distress  Mental status - alert, oriented to person, place, and time  Abdomen- Soft, Gravid uterus, non-tender to palpation  Musculoskeletal: negative for - gait disturbance or joint pain  Extremeties: negative swelling  or cyanosis   Dermatological: negative rashes or skin lesions       Assessment/Plan:   Diagnoses and all orders for this visit:    1. Supervision of other normal pregnancy, antepartum (Primary)  Assessment & Plan:  TALIB finalized: 24 per 8-week US    Genetic testing (NIPS-Quad)/CF/AFP:  Discussed all,  CF/SMA negative, NIPS negative    COVID: Fully vaccinated  Flu:recommended   Tdap:Script 27-36 weeks   FSV: 32-36 weeks     Rhogam: B positive  ? Desires Sterilization:none     Anatomy US:Cervical Length WNL 5cm, All anatomy seen and WNL, Growtyh 56%, AC 68%, 3VC-unable to see cord insertion, follow up with next cervical length, vertex, anterior placenta-grade 1, boy   FU US:    PROBLEM LIST/PLAN:   Hx of  labor/delivery:   labor at 31 and 33 weeks with first pregnancy, delivered at 35w5d due to abruption  Previous  - unsure if wants to have a TOLAC- 52%, recommend repeat   Obesity-BMI >35- testing 37 weeks   Bipolar 2-NO meds, stable   Rubella nonimmune-MMR postpartum     Orders:  -     POC Urinalysis Dipstick    2. Marijuana use  Assessment & Plan:  Positive UDS 23  Has stopped       3. Rubella non-immune status, antepartum    4. Previous  section    5. Hx of  delivery, currently pregnant    6. History of placental abruption    7. Desires  (vaginal birth after ) trial  Assessment & Plan:   52%, recommended repeat CS, pt agrees       8. Bipolar II disorder  Assessment & Plan:  Stable, no meds               Counseling:   OB precautions, leaking, VB, radha acosta vs PTL/Labor  FKC  HTN precautions reviewed: HA, vision change, RUQ/epigastric pain, edema  Round Ligament Pain:  The uterus has several ligaments which provide support and keep the uterus in place. As the  uterus grows these ligaments are pulled and stretched which often causes sharp stabbing like pain in the inguinal area.   You may find a pregnancy support band helpful. Changing  positions may also help. Yoga is a great way to cope with round ligament and low back pain in pregnancy.    Massage may also help with low back pain   Things to Consider at this Point in your Pregnancy:  Some women experience swelling in their feet during pregnancy. Compression stockings may help  Drink plenty of water and stay active   Make sure you are eating frequent small meals, nuts are a wonderful snack to keep with you            Return in about 2 weeks (around 11/6/2023) for Routine OB visit.      We have gone over prenatal care to include the timing and content of visits. I informed her how to contact the office and/or on call person in the event of any problems and encouraged her to do so when she feels it is necessary.  We then spent time answering her questions which she indicated were answered to her satisfaction.    Leslie Kessler DO  10/23/2023 10:33 EDT

## 2023-10-22 NOTE — ASSESSMENT & PLAN NOTE
TALIB finalized: 24 per 8-week US    Genetic testing (NIPS-Quad)/CF/AFP:  Discussed all,  CF/SMA negative, NIPS negative    COVID: Fully vaccinated  Flu:recommended   Tdap:Script 27-36 weeks   FSV: 32-36 weeks     Rhogam: B positive  ? Desires Sterilization:none     Anatomy US:Cervical Length WNL 5cm, All anatomy seen and WNL, Growtyh 56%, AC 68%, 3VC-unable to see cord insertion, follow up with next cervical length, vertex, anterior placenta-grade 1, boy   FU US:    PROBLEM LIST/PLAN:   Hx of  labor/delivery:   labor at 31 and 33 weeks with first pregnancy, delivered at 35w5d due to abruption  Previous  - unsure if wants to have a TOLAC- 52%, recommend repeat   Obesity-BMI >35- testing 37 weeks   Bipolar 2-NO meds, stable   Rubella nonimmune-MMR postpartum

## 2023-10-23 ENCOUNTER — ROUTINE PRENATAL (OUTPATIENT)
Dept: OBSTETRICS AND GYNECOLOGY | Facility: CLINIC | Age: 28
End: 2023-10-23
Payer: COMMERCIAL

## 2023-10-23 VITALS — SYSTOLIC BLOOD PRESSURE: 104 MMHG | WEIGHT: 225.6 LBS | DIASTOLIC BLOOD PRESSURE: 80 MMHG | BODY MASS INDEX: 38.72 KG/M2

## 2023-10-23 DIAGNOSIS — F12.90 MARIJUANA USE: ICD-10-CM

## 2023-10-23 DIAGNOSIS — O09.899 RUBELLA NON-IMMUNE STATUS, ANTEPARTUM: ICD-10-CM

## 2023-10-23 DIAGNOSIS — Z34.80 SUPERVISION OF OTHER NORMAL PREGNANCY, ANTEPARTUM: Primary | ICD-10-CM

## 2023-10-23 DIAGNOSIS — Z87.59 HISTORY OF PLACENTAL ABRUPTION: ICD-10-CM

## 2023-10-23 DIAGNOSIS — O09.899 HX OF PRETERM DELIVERY, CURRENTLY PREGNANT: ICD-10-CM

## 2023-10-23 DIAGNOSIS — F31.81 BIPOLAR II DISORDER: ICD-10-CM

## 2023-10-23 DIAGNOSIS — Z98.891 PREVIOUS CESAREAN SECTION: ICD-10-CM

## 2023-10-23 DIAGNOSIS — O34.219 DESIRES VBAC (VAGINAL BIRTH AFTER CESAREAN) TRIAL: ICD-10-CM

## 2023-10-23 DIAGNOSIS — Z28.39 RUBELLA NON-IMMUNE STATUS, ANTEPARTUM: ICD-10-CM

## 2023-10-23 LAB
GLUCOSE UR STRIP-MCNC: NEGATIVE MG/DL
PROT UR STRIP-MCNC: ABNORMAL MG/DL

## 2023-10-24 ENCOUNTER — PATIENT OUTREACH (OUTPATIENT)
Dept: LABOR AND DELIVERY | Facility: HOSPITAL | Age: 28
End: 2023-10-24
Payer: COMMERCIAL

## 2023-10-24 ENCOUNTER — APPOINTMENT (OUTPATIENT)
Dept: ULTRASOUND IMAGING | Facility: HOSPITAL | Age: 28
End: 2023-10-24
Payer: COMMERCIAL

## 2023-10-24 ENCOUNTER — HOSPITAL ENCOUNTER (OUTPATIENT)
Facility: HOSPITAL | Age: 28
Discharge: HOME | End: 2023-10-24
Attending: OBSTETRICS & GYNECOLOGY | Admitting: OBSTETRICS & GYNECOLOGY
Payer: COMMERCIAL

## 2023-10-24 VITALS
HEART RATE: 91 BPM | RESPIRATION RATE: 18 BRPM | OXYGEN SATURATION: 99 % | DIASTOLIC BLOOD PRESSURE: 79 MMHG | SYSTOLIC BLOOD PRESSURE: 108 MMHG | TEMPERATURE: 98 F

## 2023-10-24 PROCEDURE — G0463 HOSPITAL OUTPT CLINIC VISIT: HCPCS

## 2023-10-24 PROCEDURE — 76816 OB US FOLLOW-UP PER FETUS: CPT

## 2023-10-24 PROCEDURE — 76819 FETAL BIOPHYS PROFIL W/O NST: CPT

## 2023-10-24 PROCEDURE — 59025 FETAL NON-STRESS TEST: CPT

## 2023-10-24 NOTE — OUTREACH NOTE
[facility]  Check-In    Current Estimated Gestational Age: 30w1d      Questions/Answers      Flowsheet Row Responses   Best Method for Contacting Cell   Demographics Reviewed Yes   Able to keep appointments as scheduled Yes   Baby Active/Feeling Fetal Movemen Yes   How are you presently feeling? Ok, in triage for decreased fm   Resource/Environmental Concerns None   Do you have any questions related to your care experience, your pregnancy, plans for delivery, any concerns, etc? No            Francia Holloway RN  Maternity Nurse Navigator    10/24/2023, 13:37 EDT

## 2023-10-24 NOTE — NON STRESS TEST
Obstetrical Non-stress Test Interpretation     Name:  Shruti Pearl  MRN: [id number]    28 y.o. female  at 30w1d    Indication: DFM      Fetal Assessment  Fetal Movement: active  Fetal HR Assessment Method: external  Fetal HR (beats/min): 125  Fetal HR Baseline: normal range  Fetal HR Variability: moderate (amplitude range 6 to 25 bpm)  Fetal HR Accelerations: lasting at least 15 seconds, greater than/equal to 15 bpm  Fetal HR Decelerations: absent  Sinusoidal Pattern Present: absent  Fetal HR Tracing Category: Category I    /79   Pulse 91   Temp 98 °F (36.7 °C)   Resp 18   LMP 2023 (Exact Date)   SpO2 99%     Reason for test: Decreased fetal movement  Date of Test: 10/24/2023  Time frame of test:   RN NST Interpretation: Jamie Vidal RN  10/24/2023  14:16 EDT  Patient Vitals for the past 24 hrs:   BP Temp Pulse Resp SpO2   10/24/23 1216 108/79 98 °F (36.7 °C) 91 18 --   10/24/23 1215 -- -- 95 -- 99 %   10/24/23 1210 -- -- 93 -- 98 %   10/24/23 1205 -- -- 101 -- 99 %   10/24/23 1200 109/77 -- 92 -- 100 %   10/24/23 1155 -- -- 95 -- 100 %   10/24/23 1149 -- -- 92 -- 99 %   10/24/23 1145 102/73 -- 88 -- 100 %   10/24/23 1140 -- -- 91 -- 100 %   10/24/23 1135 -- -- 94 -- 100 %   10/24/23 1131 99/68 -- 89 -- --   10/24/23 1130 -- -- 93 -- 100 %   10/24/23 1120 -- -- 88 -- 98 %   10/24/23 1116 101/68 -- 83 -- --   10/24/23 1115 -- -- 91 -- 98 %   10/24/23 1106 100/68 -- 95 18 --

## 2023-10-24 NOTE — H&P
ANNITA Rothman  Obstetric History and Physical    Chief Complaint   Patient presents with    Decreased Fetal Movement     Did not have kick counts this morning     right sided abdominal pain        Subjective     HPI:    Patient is a 28 y.o. female  currently at 30w1d, who presents to OB ED with/for decreased fetal movement. States only felt baby move 3-4 times during the 2 hour kick count this morning. She had breakfast prior to kick counts. She also states that she started having a quick, sharp pain on her abdomen (right of the umbilicus). This pain will occur maybe 1-4 times in an hour and stays for just a few seconds. She states it does not feel like contractions nor does it feel like the pain she had with her abruption. She denies bleeding or leaking fluid.    She has been seeing [facility] [place].  The pregnancy has been complicated by the following problems:    Patient Active Problem List   Diagnosis    Bipolar II disorder    ADHD (attention deficit hyperactivity disorder), combined type    Post traumatic stress disorder (PTSD)    Marijuana use    Cervical high risk HPV (human papillomavirus) test positive    Supervision of other normal pregnancy, antepartum    Previous  section    Hx of  delivery, currently pregnant    Rubella non-immune status, antepartum    History of placental abruption    Desires  (vaginal birth after ) trial    Abnormal GTT (glucose tolerance test)         The following portions of the patients history were reviewed and updated as appropriate:   current medications, allergies, past medical history, past surgical history, past family history, past social history and current problem list.     Prenatal Information:  Prenatal Results       Initial Prenatal Labs       Test Value Reference Range Date Time    Hemoglobin  13.8 g/dL 12.0 - 15.9 23 1557       14.2 g/dL 12.0 - 15.9 06/15/23 1352    Hematocrit  38.4 % 34.0 - 46.6 23 1557       40.5  % 34.0 - 46.6 06/15/23 1352    Platelets  194 10*3/mm3 140 - 450 07/11/23 1557       208 10*3/mm3 140 - 450 06/15/23 1352    Rubella IgG  <0.90 index Immune >0.99 06/15/23 1352    Hepatitis B SAg  Non-Reactive  Non-Reactive 06/15/23 1352    Hepatitis C Ab  Non-Reactive  Non-Reactive 06/15/23 1352    RPR  NON-REACTIVE NA  12/06/20 2000    T. Pallidum Ab   Non-Reactive  Non-Reactive 06/15/23 1352    ABO  B   06/15/23 1352    Rh  Positive   06/15/23 1352    Antibody Screen  Negative   06/15/23 1352    HIV  Non-Reactive  Non-Reactive 06/15/23 1352    Urine Culture  No growth   09/28/23 1930       No growth   07/11/23 1633       No growth   06/15/23 1336    Gonorrhea  Negative  Negative 06/15/23 1336    Chlamydia  Negative  Negative 06/15/23 1336    TSH  1.800 uIU/mL 0.270 - 4.200 01/23/23 0837    HgB A1c         Varicella IgG        HgB Electrophoresis         Cystic fibrosis                     2nd and 3rd Trimester       Test Value Reference Range Date Time    Hemoglobin (repeated)  12.1 g/dL 12.0 - 15.9 09/26/23 0957       13.1 g/dL 12.0 - 15.9 09/21/23 1537       12.1 g/dL 12.0 - 15.9 09/20/23 1945    Hematocrit (repeated)  36.3 % 34.0 - 46.6 09/26/23 0957       38.9 % 34.0 - 46.6 09/21/23 1537       36.6 % 34.0 - 46.6 09/20/23 1945    Platelets   192 10*3/mm3 140 - 450 09/26/23 0957       214 10*3/mm3 140 - 450 09/21/23 1537       203 10*3/mm3 140 - 450 09/20/23 1945       194 10*3/mm3 140 - 450 07/11/23 1557       208 10*3/mm3 140 - 450 06/15/23 1352    GCT  152 mg/dL 65 - 139 09/26/23 0957    Antibody Screen (repeated)  Negative   06/15/23 1352    GTT Fasting  74 mg/dL 65 - 94 10/06/23 0908    GTT 1 Hr  153 mg/dL 65 - 179 10/06/23 1010    GTT 2 Hr  118 mg/dL 65 - 154 10/06/23 1107    GTT 3 Hr  82 mg/dL 65 - 139 10/06/23 1210    Group B Strep                 External Prenatal Results       Pregnancy Outside Results - Transcribed From Office Records - See Scanned Records For Details       Test Value Date Time     ABO  B  06/15/23 1352    Rh  Positive  06/15/23 1352    Antibody Screen  Negative  06/15/23 1352    Varicella IgG       Rubella  <0.90 index 06/15/23 1352    Hgb  12.1 g/dL 23 0957       13.1 g/dL 23 1537       12.1 g/dL 23 1945       13.8 g/dL 23 1557       14.2 g/dL 06/15/23 1352    Hct  36.3 % 23 0957       38.9 % 23 1537       36.6 % 23 1945       38.4 % 23 1557       40.5 % 06/15/23 1352    Glucose Fasting GTT  74 mg/dL 10/06/23 0908    Glucose Tolerance Test 1 hour  153 mg/dL 10/06/23 1010    Glucose Tolerance Test 3 hour  82 mg/dL 10/06/23 1210    Gonorrhea (discrete)  Negative  06/15/23 1336    Chlamydia (discrete)  Negative  06/15/23 1336    RPR  NON-REACTIVE NA 20    VDRL       Syphilis Antibody       HBsAg  Non-Reactive  06/15/23 1352    Herpes Simplex Virus PCR       Herpes Simplex VIrus Culture       HIV  Non-Reactive  06/15/23 1352    Hep C RNA Quant PCR       Hep C Antibody  Non-Reactive  06/15/23 1352    AFP       Group B Strep       GBS Susceptibility to Clindamycin       GBS Susceptibility to Erythromycin       Fetal Fibronectin       Genetic Testing, Maternal Blood                 Drug Screening       Test Value Date Time    Urine Drug Screen       Amphetamine Screen       Barbiturate Screen  Negative  23 0803    Benzodiazepine Screen  Negative  23 0803    Methadone Screen  Negative  23 0803    Phencyclidine Screen       Opiates Screen  Negative  23 0803    THC Screen  Positive  23 0803    Cocaine Screen       Propoxyphene Screen       Buprenorphine Screen       Methamphetamine Screen       Oxycodone Screen  Negative  23 0803    Tricyclic Antidepressants Screen                     Past OB History:     OB History    Para Term  AB Living   3 1 0 1 1 1   SAB IAB Ectopic Molar Multiple Live Births   0 0 1 0 0 1      # Outcome Date GA Lbr Olegario/2nd Weight Sex Delivery Anes PTL Lv   3 Current             2 Ectopic 2018 8w0d    ECTOPIC      1  14 35w5d  2929 g (6 lb 7.3 oz) M CS-LTranv Spinal Y CINDY      Apgar1: 6  Apgar5: 9       Past Medical History: Past Medical History:   Diagnosis Date    Allergies     Anxiety     Asthma     Bipolar disorder     Depression     Kidney stone     Panic disorder     Placental abruption 2014    Premature labor after 22 weeks and before 37 weeks without delivery 2014    PTSD (post-traumatic stress disorder)     Status post  delivery 2014      Past Surgical History Past Surgical History:   Procedure Laterality Date     SECTION        Family History: Family History   Problem Relation Age of Onset    Depression Mother     Bipolar disorder Mother     ADD / ADHD Mother     Depression Father     ADD / ADHD Father     Suicide Attempts Sister     Anxiety disorder Sister     ADD / ADHD Sister     Diabetes Maternal Grandfather     Diabetes Other         UNCLE    ADD / ADHD Son       Social History:  reports that she has quit smoking. Her smoking use included cigarettes. She has a 8.00 pack-year smoking history. She has been exposed to tobacco smoke. She has never used smokeless tobacco.   reports that she does not currently use alcohol.   reports that she does not currently use drugs after having used the following drugs: Marijuana.        General ROS: Pertinent items are noted in HPI  Home Medications:  busPIRone, escitalopram, metoclopramide, prenatal vitamin, and sodium chloride    Allergies:  Allergies   Allergen Reactions    Erythromycin Unknown - Low Severity    Nitrofurantoin Macrocrystal Hives    Penicillins Hives       Objective       Vital Signs Range for the last 24 hours  Temperature:     Temp Source:     BP: BP: (100)/(68) 100/68   Pulse: Heart Rate:  [95] 95   Respirations: Resp:  [18] 18   SPO2:       Physical Examination:   General appearance - alert, well appearing, and in no distress  Mental status - alert, oriented to person,  place, and time  Chest - clear to auscultation  Heart - normal rate, regular rhythm,  Abdomen - soft, nontender, nondistended  Pelvic - deferred  Back exam - full range of motion, no tenderness or pain on motion  Neurological - alert, oriented, normal speech  Extremities - Edema none  Skin - normal coloration and turgor, no suspicious skin lesions noted      [place]: rare contraction noted   NST: reactive/category 1    Assessment & Plan     Assessment:  -  Intrauterine pregnancy at 30w1d gestation who presents for: decreased fetal movement and abdominal pain. No bleeding or leaking fluid.    Plan:  - No regular contractions suggesting  labor. Fetus is reactive and strip is category 1. As a precaution will check ultrasound for any sonographic appearance of abruption and get BPP.   - Plan of care has been reviewed with patient and patient agrees.   - Risks, benefits of treatment plan have been discussed.  - All questions have been answered.        Electronically signed by Delaney Unger MD, 10/24/23, 12:05 PM EDT.

## 2023-10-30 ENCOUNTER — TELEPHONE (OUTPATIENT)
Dept: OBSTETRICS AND GYNECOLOGY | Facility: CLINIC | Age: 28
End: 2023-10-30
Payer: COMMERCIAL

## 2023-10-30 NOTE — TELEPHONE ENCOUNTER
LVM - Need to change the time of appointment on 12/4/23 - Dr Kessler will be starting office @ [phone]/[phone] option #3

## 2023-11-01 ENCOUNTER — ROUTINE PRENATAL (OUTPATIENT)
Dept: OBSTETRICS AND GYNECOLOGY | Facility: CLINIC | Age: 28
End: 2023-11-01
Payer: COMMERCIAL

## 2023-11-01 VITALS — SYSTOLIC BLOOD PRESSURE: 118 MMHG | BODY MASS INDEX: 39.45 KG/M2 | DIASTOLIC BLOOD PRESSURE: 76 MMHG | WEIGHT: 229.8 LBS

## 2023-11-01 DIAGNOSIS — Z87.59 HISTORY OF PLACENTAL ABRUPTION: ICD-10-CM

## 2023-11-01 DIAGNOSIS — O44.20 PARTIAL PLACENTA PREVIA: ICD-10-CM

## 2023-11-01 DIAGNOSIS — Z34.80 SUPERVISION OF OTHER NORMAL PREGNANCY, ANTEPARTUM: Primary | ICD-10-CM

## 2023-11-01 LAB
GLUCOSE UR STRIP-MCNC: NEGATIVE MG/DL
PROT UR STRIP-MCNC: NEGATIVE MG/DL

## 2023-11-01 NOTE — PROGRESS NOTES
Routine Prenatal Visit     Subjective  Shruti Pearl is a 28 y.o.  at 31w2d here for her routine OB visit.   She is taking her prenatal vitamins.Reports no loss of fluid or vaginal bleeding. Patient doing well without any complaints. Pregnancy complicated by:     Patient Active Problem List   Diagnosis    Bipolar II disorder    ADHD (attention deficit hyperactivity disorder), combined type    Post traumatic stress disorder (PTSD)    Marijuana use    Cervical high risk HPV (human papillomavirus) test positive    Supervision of other normal pregnancy, antepartum    Previous  section    Hx of  delivery, currently pregnant    Rubella non-immune status, antepartum    History of placental abruption    Desires  (vaginal birth after ) trial    Abnormal GTT (glucose tolerance test)         OB History    Para Term  AB Living   3 1   1 1 1   SAB IAB Ectopic Molar Multiple Live Births       1     1      # Outcome Date GA Lbr Olegario/2nd Weight Sex Delivery Anes PTL Lv   3 Current            2 Ectopic 2018 8w0d    ECTOPIC      1  14 35w5d  2929 g (6 lb 7.3 oz) M CS-LTranv Spinal Y CINDY           ROS:   General ROS: negative for - chills or fatigue  Respiratory ROS: negative for - cough or hemoptysis  Cardiovascular ROS: negative for - chest pain or dyspnea on exertion  Genito-Urinary ROS: negative for  change in urinary stream, vaginal discharge   Musculoskeletal ROS: negative for - gait disturbance or joint pain  Dermatological ROS: negative for acne,  dry skin or itching    Objective  Physical Exam:   Vitals:    23 0959   BP: 118/76       Uterine Size: size equals dates  FHT: 110-160 BPM    General appearance - alert, well appearing, and in no distress  Mental status - alert, oriented to person, place, and time  Abdomen- Soft, Gravid uterus, non-tender to palpation  Musculoskeletal: negative for - gait disturbance or joint pain  Extremeties: negative swelling  or cyanosis   Dermatological: negative rashes or skin lesions       Assessment/Plan:   Diagnoses and all orders for this visit:    1. Supervision of other normal pregnancy, antepartum (Primary)  -     POC Urinalysis Dipstick    2. History of placental abruption  -     Fetal Nonstress Test; Standing    3. Partial placenta previa  -     US Ob 14 + Weeks Single or First Gestation; Future            Counseling:   OB precautions, leaking, VB, radha acosta vs PTL/Labor  FKC  HTN precautions reviewed: HA, vision change, RUQ/epigastric pain, edema  Round Ligament Pain:  The uterus has several ligaments which provide support and keep the uterus in place. As the  uterus grows these ligaments are pulled and stretched which often causes sharp stabbing like pain in the inguinal area.   You may find a pregnancy support band helpful. Changing positions may also help. Yoga is a great way to cope with round ligament and low back pain in pregnancy.    Massage may also help with low back pain   Things to Consider at this Point in your Pregnancy:  Some women experience swelling in their feet during pregnancy. Compression stockings may help  Drink plenty of water and stay active   Make sure you are eating frequent small meals, nuts are a wonderful snack to keep with you            Return in about 2 weeks (around 11/15/2023) for Routine OB visit.      We have gone over prenatal care to include the timing and content of visits. I informed her how to contact the office and/or on call person in the event of any problems and encouraged her to do so when she feels it is necessary.  We then spent time answering her questions which she indicated were answered to her satisfaction.    Leslie Kessler DO  11/1/2023 10:08 EDT

## 2023-11-05 PROBLEM — O34.219 DESIRES VBAC (VAGINAL BIRTH AFTER CESAREAN) TRIAL: Status: RESOLVED | Noted: 2023-08-29 | Resolved: 2023-11-05

## 2023-11-05 NOTE — PROGRESS NOTES
Routine Prenatal Visit     Subjective  Shruti Pearl is a 28 y.o.  at 32w0d here for her routine OB visit.   She is taking her prenatal vitamins.Reports no loss of fluid or vaginal bleeding. Patient doing well without any complaints. Pregnancy complicated by:     Patient Active Problem List   Diagnosis    Bipolar II disorder    ADHD (attention deficit hyperactivity disorder), combined type    Post traumatic stress disorder (PTSD)    Marijuana use    Cervical high risk HPV (human papillomavirus) test positive    Supervision of other normal pregnancy, antepartum    Previous  section    Hx of  delivery, currently pregnant    Rubella non-immune status, antepartum    History of placental abruption    Abnormal GTT (glucose tolerance test)         OB History    Para Term  AB Living   3 1   1 1 1   SAB IAB Ectopic Molar Multiple Live Births       1     1      # Outcome Date GA Lbr Olegario/2nd Weight Sex Delivery Anes PTL Lv   3 Current            2 Ectopic 2018 8w0d    ECTOPIC      1  14 35w5d  2929 g (6 lb 7.3 oz) M CS-LTranv Spinal Y CINDY           ROS:   General ROS: negative for - chills or fatigue  Respiratory ROS: negative for - cough or hemoptysis  Cardiovascular ROS: negative for - chest pain or dyspnea on exertion  Genito-Urinary ROS: negative for  change in urinary stream, vaginal discharge   Musculoskeletal ROS: negative for - gait disturbance or joint pain  Dermatological ROS: negative for acne,  dry skin or itching    Objective  Physical Exam:   Vitals:    23 0844   BP: 122/84       Uterine Size: size equals dates  FHT: 110-160 BPM    General appearance - alert, well appearing, and in no distress  Mental status - alert, oriented to person, place, and time  Abdomen- Soft, Gravid uterus, non-tender to palpation  Musculoskeletal: negative for - gait disturbance or joint pain  Extremeties: negative swelling or cyanosis   Dermatological: negative rashes or skin  lesions       Assessment/Plan:   Diagnoses and all orders for this visit:    1. Supervision of other normal pregnancy, antepartum (Primary)  Assessment & Plan:  TALIB finalized: 24 per 8-week US    Genetic testing (NIPS-Quad)/CF/AFP:  Discussed all,  CF/SMA negative, NIPS negative    COVID: Fully vaccinated  Flu:recommended   Tdap:Script 27-36 weeks   FSV: 32-36 weeks     Rhogam: B positive  ? Desires Sterilization:none     Anatomy US:Cervical Length WNL 5cm, All anatomy seen and WNL, Growtyh 56%, AC 68%, 3VC-unable to see cord insertion, follow up with next cervical length, vertex, anterior placenta-grade 1, boy   FU US:    PROBLEM LIST/PLAN:   Hx of  labor/delivery:   labor at 31 and 33 weeks with first pregnancy, delivered at 35w5d due to abruption  Previous  - unsure if wants to have a TOLAC- 52%, recommend repeat   Obesity-BMI >35- testing 37 weeks   Bipolar 2-NO meds, stable   Rubella nonimmune-MMR postpartum     Orders:  -     POC Urinalysis Dipstick    2. Marijuana use    3. Rubella non-immune status, antepartum    4. Previous  section    5. Hx of  delivery, currently pregnant    6. History of placental abruption  Assessment & Plan:   testing 32 weeks       7. Bipolar II disorder  Assessment & Plan:  Stable, no meds       8. Dysuria  -     Urine Culture - Urine, Urine, Clean Catch            Counseling:   OB precautions, leaking, VB, radha acosta vs PTL/Labor  FKC  HTN precautions reviewed: HA, vision change, RUQ/epigastric pain, edema  Round Ligament Pain:  The uterus has several ligaments which provide support and keep the uterus in place. As the  uterus grows these ligaments are pulled and stretched which often causes sharp stabbing like pain in the inguinal area.   You may find a pregnancy support band helpful. Changing positions may also help. Yoga is a great way to cope with round ligament and low back pain in pregnancy.    Massage may also  help with low back pain   Things to Consider at this Point in your Pregnancy:  Some women experience swelling in their feet during pregnancy. Compression stockings may help  Drink plenty of water and stay active   Make sure you are eating frequent small meals, nuts are a wonderful snack to keep with you            Return in about 2 weeks (around 11/20/2023) for Routine OB visit.      We have gone over prenatal care to include the timing and content of visits. I informed her how to contact the office and/or on call person in the event of any problems and encouraged her to do so when she feels it is necessary.  We then spent time answering her questions which she indicated were answered to her satisfaction.    Leslie Kessler DO  11/6/2023 08:52 EST

## 2023-11-06 ENCOUNTER — ROUTINE PRENATAL (OUTPATIENT)
Dept: OBSTETRICS AND GYNECOLOGY | Facility: CLINIC | Age: 28
End: 2023-11-06
Payer: COMMERCIAL

## 2023-11-06 VITALS — DIASTOLIC BLOOD PRESSURE: 84 MMHG | BODY MASS INDEX: 39.55 KG/M2 | WEIGHT: 230.4 LBS | SYSTOLIC BLOOD PRESSURE: 122 MMHG

## 2023-11-06 DIAGNOSIS — Z34.80 SUPERVISION OF OTHER NORMAL PREGNANCY, ANTEPARTUM: Primary | ICD-10-CM

## 2023-11-06 DIAGNOSIS — Z98.891 PREVIOUS CESAREAN SECTION: ICD-10-CM

## 2023-11-06 DIAGNOSIS — R30.0 DYSURIA: ICD-10-CM

## 2023-11-06 DIAGNOSIS — O09.899 HX OF PRETERM DELIVERY, CURRENTLY PREGNANT: ICD-10-CM

## 2023-11-06 DIAGNOSIS — Z28.39 RUBELLA NON-IMMUNE STATUS, ANTEPARTUM: ICD-10-CM

## 2023-11-06 DIAGNOSIS — Z87.59 HISTORY OF PLACENTAL ABRUPTION: ICD-10-CM

## 2023-11-06 DIAGNOSIS — F12.90 MARIJUANA USE: ICD-10-CM

## 2023-11-06 DIAGNOSIS — F31.81 BIPOLAR II DISORDER: ICD-10-CM

## 2023-11-06 DIAGNOSIS — O09.899 RUBELLA NON-IMMUNE STATUS, ANTEPARTUM: ICD-10-CM

## 2023-11-06 LAB
GLUCOSE UR STRIP-MCNC: NEGATIVE MG/DL
PROT UR STRIP-MCNC: ABNORMAL MG/DL

## 2023-11-06 PROCEDURE — 87086 URINE CULTURE/COLONY COUNT: CPT | Performed by: OBSTETRICS & GYNECOLOGY

## 2023-11-07 ENCOUNTER — HOSPITAL ENCOUNTER (OUTPATIENT)
Facility: HOSPITAL | Age: 28
Discharge: HOME | End: 2023-11-07
Attending: OBSTETRICS & GYNECOLOGY | Admitting: OBSTETRICS & GYNECOLOGY
Payer: COMMERCIAL

## 2023-11-07 VITALS — RESPIRATION RATE: 16 BRPM | SYSTOLIC BLOOD PRESSURE: 108 MMHG | DIASTOLIC BLOOD PRESSURE: 69 MMHG | HEART RATE: 97 BPM

## 2023-11-07 LAB — BACTERIA SPEC AEROBE CULT: NORMAL

## 2023-11-07 PROCEDURE — 59025 FETAL NON-STRESS TEST: CPT

## 2023-11-07 PROCEDURE — 59025 FETAL NON-STRESS TEST: CPT | Performed by: OBSTETRICS & GYNECOLOGY

## 2023-11-07 NOTE — NON STRESS TEST
Obstetrical Non-stress Test Interpretation     Name:  Shruti Pearl  MRN: [id number]    28 y.o. female  at 32w1d    Indication: hx of placental abruption; partial previa this pregnancy      Fetal Assessment  Fetal Movement: active  Fetal HR Assessment Method: external  Fetal HR (beats/min): 130  Fetal HR Baseline: normal range  Fetal HR Variability: moderate (amplitude range 6 to 25 bpm)  Fetal HR Accelerations: lasting at least 15 seconds, greater than/equal to 15 bpm  Fetal HR Decelerations: absent  Sinusoidal Pattern Present: absent  Fetal HR Tracing Category: Category I    /69 (BP Location: Left arm, Patient Position: Sitting)   Pulse 97   Resp 16   LMP 2023 (Exact Date)     Reason for test: hx of placental abruption; partial previa this pregnancy  Date of Test: 2023  Time frame of test: 6529-2535  RN NST Interpretation:  reactive      Leslie Powers RN  2023  13:41 EST

## 2023-11-14 ENCOUNTER — HOSPITAL ENCOUNTER (OUTPATIENT)
Facility: HOSPITAL | Age: 28
Discharge: HOME | End: 2023-11-14
Attending: OBSTETRICS & GYNECOLOGY | Admitting: OBSTETRICS & GYNECOLOGY
Payer: COMMERCIAL

## 2023-11-14 VITALS
OXYGEN SATURATION: 99 % | HEART RATE: 106 BPM | SYSTOLIC BLOOD PRESSURE: 116 MMHG | RESPIRATION RATE: 16 BRPM | DIASTOLIC BLOOD PRESSURE: 74 MMHG

## 2023-11-14 PROCEDURE — 59025 FETAL NON-STRESS TEST: CPT

## 2023-11-14 NOTE — NON STRESS TEST
Obstetrical Non-stress Test Interpretation     Name:  Shruti Pearl  MRN: [id number]    28 y.o. female  at 33w1d    Indication: partial previa,hx of abruption      Fetal Assessment  Fetal Movement: active  Fetal HR Assessment Method: external  Fetal HR (beats/min): 130  Fetal HR Baseline: normal range  Fetal HR Variability: moderate (amplitude range 6 to 25 bpm)  Fetal HR Accelerations: lasting at least 15 seconds, greater than/equal to 15 bpm  Fetal HR Decelerations: variable  Sinusoidal Pattern Present: absent  Fetal HR Tracing Category: Category I    /74 (BP Location: Right arm, Patient Position: Sitting)   Pulse 106   Resp 16   LMP 2023 (Exact Date)   SpO2 99%     Reason for test: Other (Comment) (partial previa)  Date of Test: 2023  Time frame of test: 8263-0670  RN NST Interpretation: Reactive      Joana Saunders RN  2023  16:48 EST

## 2023-11-14 NOTE — NON STRESS TEST
ANNITA Rothman   OB NST Note    2023   Name:  Shruti Pearl  MRN: [id number]    Subjective:  28 y.o.  at 33w1d    Indication:  Placenta previa, history of placental abruption    NST:   Baseline: 130  Variability:   Moderate/Normal (amplitude 6-25 bpm)  Accelerations: Present (32 weeks+) 15 x 15 bpm  Decelerations: There is a single brief variable deceleration at 4:03 PM  Contractions:  Absent    NST interpretation: reactive    Documented Vitals    23 1606   BP: 116/74   Pulse: 106   Resp: 16   SpO2: 99%         Lab Results (last 24 hours)       ** No results found for the last 24 hours. **             Assessment:  28 y.o.  AT 33w1d  Placenta previa, history of placental abruption    Plan:   OB Precautions, FKC, Keep scheduled NSTs, Keep office visit  There was a single brief variable deceleration during the NST.  This is most likely consistent with early gestational age.  The fetal heart rate tracing was very reassuring before and reassuring after.  No other decelerations were noted.      Electronically signed by Wolfgang Uribe MD, 23, 4:52 PM EST.

## 2023-11-20 ENCOUNTER — HOSPITAL ENCOUNTER (OUTPATIENT)
Dept: LABOR AND DELIVERY | Facility: HOSPITAL | Age: 28
Discharge: HOME | End: 2023-11-20
Payer: COMMERCIAL

## 2023-11-20 ENCOUNTER — HOSPITAL ENCOUNTER (OUTPATIENT)
Facility: HOSPITAL | Age: 28
Discharge: HOME | End: 2023-11-20
Attending: STUDENT IN AN ORGANIZED HEALTH CARE EDUCATION/TRAINING PROGRAM | Admitting: STUDENT IN AN ORGANIZED HEALTH CARE EDUCATION/TRAINING PROGRAM
Payer: COMMERCIAL

## 2023-11-20 VITALS
RESPIRATION RATE: 16 BRPM | DIASTOLIC BLOOD PRESSURE: 73 MMHG | TEMPERATURE: 98.2 F | BODY MASS INDEX: 39.55 KG/M2 | HEIGHT: 64 IN | OXYGEN SATURATION: 100 % | HEART RATE: 117 BPM | SYSTOLIC BLOOD PRESSURE: 120 MMHG

## 2023-11-20 PROCEDURE — 59025 FETAL NON-STRESS TEST: CPT

## 2023-11-20 NOTE — NON STRESS TEST
"Obstetrical Non-stress Test Interpretation     Name:  Shruti Pearl  MRN: [id number]    28 y.o. female  at 34w0d    Indication: partial previa, hx. Placental abruption       Fetal Movement: active  Fetal HR Assessment Method: external  Fetal HR (beats/min): 130  Fetal HR Baseline: normal range  Fetal HR Variability: moderate (amplitude range 6 to 25 bpm)  Fetal HR Accelerations: episodic, greater than/equal to 15 bpm, lasting at least 15 seconds  Fetal HR Decelerations: absent  Sinusoidal Pattern Present: absent    /73 (BP Location: Right arm, Patient Position: Sitting)   Pulse 117   Temp 98.2 °F (36.8 °C) (Oral)   Resp 16   Ht 162.6 cm (64\")   LMP 2023 (Exact Date)   SpO2 100%   BMI 39.55 kg/m²     Reason for test: Other (Comment) (partial previa, hx. of placental abruption)  Date of Test: 2023  Time frame of test: 3867-9764  RN NST Interpretation: Reactive      Tiffanie Sommer RN  2023  14:38 EST  "

## 2023-11-20 NOTE — PROGRESS NOTES
Routine Prenatal Visit     Subjective  Shruti Pearl is a 28 y.o.  at 34w1d here for her routine OB visit.   She is taking her prenatal vitamins.Reports no loss of fluid or vaginal bleeding. Patient doing well without any complaints. Pregnancy complicated by:     Patient Active Problem List   Diagnosis    Bipolar II disorder    ADHD (attention deficit hyperactivity disorder), combined type    Post traumatic stress disorder (PTSD)    Marijuana use    Cervical high risk HPV (human papillomavirus) test positive    Supervision of other normal pregnancy, antepartum    Previous  section    Hx of  delivery, currently pregnant    Rubella non-immune status, antepartum    History of placental abruption    Abnormal GTT (glucose tolerance test)         OB History    Para Term  AB Living   3 1   1 1 1   SAB IAB Ectopic Molar Multiple Live Births       1     1      # Outcome Date GA Lbr Olegario/2nd Weight Sex Delivery Anes PTL Lv   3 Current            2 Ectopic 2018 8w0d    ECTOPIC      1  14 35w5d  2929 g (6 lb 7.3 oz) M CS-LTranv Spinal Y CINDY           ROS:   General ROS: negative for - chills or fatigue  Respiratory ROS: negative for - cough or hemoptysis  Cardiovascular ROS: negative for - chest pain or dyspnea on exertion  Genito-Urinary ROS: negative for  change in urinary stream, vaginal discharge   Musculoskeletal ROS: negative for - gait disturbance or joint pain  Dermatological ROS: negative for acne,  dry skin or itching    Objective  Physical Exam:   Vitals:    23 0932   BP: 104/74       Uterine Size: not examined, US today  FHT: 110-160 BPM    General appearance - alert, well appearing, and in no distress  Mental status - alert, oriented to person, place, and time  Abdomen- Soft, Gravid uterus, non-tender to palpation  Musculoskeletal: negative for - gait disturbance or joint pain  Extremeties: negative swelling or cyanosis   Dermatological: negative rashes or  skin lesions       Assessment/Plan:   Diagnoses and all orders for this visit:    1. Supervision of other normal pregnancy, antepartum (Primary)  Assessment & Plan:  TALIB finalized: 24 per 8-week US    Genetic testing (NIPS-Quad)/CF/AFP:  Discussed all,  CF/SMA negative, NIPS negative    COVID: Fully vaccinated  Flu:recommended   Tdap:Script 27-36 weeks   RSV: 32-36 weeks     Rhogam: B positive  ? Desires Sterilization:none     Anatomy US:Cervical Length WNL 5cm, All anatomy seen and WNL, Growtyh 56%, AC 68%, 3VC-unable to see cord insertion, follow up with next cervical length, vertex, anterior placenta-grade 1, boy   FU US:    PROBLEM LIST/PLAN:   Hx of  labor/delivery:   labor at 31 and 33 weeks with first pregnancy, delivered at 35w5d due to abruption  Previous  - unsure if wants to have a TOLAC- 52%, recommend repeat   Obesity-BMI >35- testing 37 weeks   Bipolar 2-NO meds, stable   Rubella nonimmune-MMR postpartum     Orders:  -     POC Urinalysis Dipstick  -     US Ob 14 + Weeks Single or First Gestation; Future    2. Marijuana use    3. History of placental abruption  -     US Ob 14 + Weeks Single or First Gestation; Future    4. Hx of  delivery, currently pregnant    5. Previous  section    6. Rubella non-immune status, antepartum    7. Bipolar II disorder    8. Proteinuria affecting pregnancy, antepartum  -     Protein / Creatinine Ratio, Urine - Urine, Clean Catch            Counseling:   OB precautions, leaking, VB, radha acosta vs PTL/Labor  FKC  HTN precautions reviewed: HA, vision change, RUQ/epigastric pain, edema  Round Ligament Pain:  The uterus has several ligaments which provide support and keep the uterus in place. As the  uterus grows these ligaments are pulled and stretched which often causes sharp stabbing like pain in the inguinal area.   You may find a pregnancy support band helpful. Changing positions may also help. Yoga is a great way  to cope with round ligament and low back pain in pregnancy.    Massage may also help with low back pain   Things to Consider at this Point in your Pregnancy:  Some women experience swelling in their feet during pregnancy. Compression stockings may help  Drink plenty of water and stay active   Make sure you are eating frequent small meals, nuts are a wonderful snack to keep with you            Return in about 2 weeks (around 12/5/2023) for Routine OB visit.      We have gone over prenatal care to include the timing and content of visits. I informed her how to contact the office and/or on call person in the event of any problems and encouraged her to do so when she feels it is necessary.  We then spent time answering her questions which she indicated were answered to her satisfaction.    Leslie Kessler DO  11/21/2023 09:43 EST

## 2023-11-20 NOTE — ASSESSMENT & PLAN NOTE
TALIB finalized: 24 per 8-week US    Genetic testing (NIPS-Quad)/CF/AFP:  Discussed all,  CF/SMA negative, NIPS negative    COVID: Fully vaccinated  Flu:recommended   Tdap:Script 27-36 weeks   RSV: 32-36 weeks     Rhogam: B positive  ? Desires Sterilization:none     Anatomy US:Cervical Length WNL 5cm, All anatomy seen and WNL, Growtyh 56%, AC 68%, 3VC-unable to see cord insertion, follow up with next cervical length, vertex, anterior placenta-grade 1, boy   FU US:    PROBLEM LIST/PLAN:   Hx of  labor/delivery:   labor at 31 and 33 weeks with first pregnancy, delivered at 35w5d due to abruption  Previous  - unsure if wants to have a TOLAC- 52%, recommend repeat   Obesity-BMI >35- testing 37 weeks   Bipolar 2-NO meds, stable   Rubella nonimmune-MMR postpartum

## 2023-11-21 ENCOUNTER — ROUTINE PRENATAL (OUTPATIENT)
Dept: OBSTETRICS AND GYNECOLOGY | Facility: CLINIC | Age: 28
End: 2023-11-21
Payer: COMMERCIAL

## 2023-11-21 VITALS — DIASTOLIC BLOOD PRESSURE: 74 MMHG | SYSTOLIC BLOOD PRESSURE: 104 MMHG | BODY MASS INDEX: 39.31 KG/M2 | WEIGHT: 229 LBS

## 2023-11-21 DIAGNOSIS — O09.899 RUBELLA NON-IMMUNE STATUS, ANTEPARTUM: ICD-10-CM

## 2023-11-21 DIAGNOSIS — Z28.39 RUBELLA NON-IMMUNE STATUS, ANTEPARTUM: ICD-10-CM

## 2023-11-21 DIAGNOSIS — F12.90 MARIJUANA USE: ICD-10-CM

## 2023-11-21 DIAGNOSIS — O09.899 HX OF PRETERM DELIVERY, CURRENTLY PREGNANT: ICD-10-CM

## 2023-11-21 DIAGNOSIS — Z87.59 HISTORY OF PLACENTAL ABRUPTION: ICD-10-CM

## 2023-11-21 DIAGNOSIS — Z98.891 PREVIOUS CESAREAN SECTION: ICD-10-CM

## 2023-11-21 DIAGNOSIS — F31.81 BIPOLAR II DISORDER: ICD-10-CM

## 2023-11-21 DIAGNOSIS — Z34.80 SUPERVISION OF OTHER NORMAL PREGNANCY, ANTEPARTUM: Primary | ICD-10-CM

## 2023-11-21 DIAGNOSIS — O12.10 PROTEINURIA AFFECTING PREGNANCY, ANTEPARTUM: ICD-10-CM

## 2023-11-21 LAB
CREAT UR-MCNC: 244.8 MG/DL
GLUCOSE UR STRIP-MCNC: NEGATIVE MG/DL
PROT ?TM UR-MCNC: 27.9 MG/DL
PROT UR STRIP-MCNC: ABNORMAL MG/DL
PROT/CREAT UR: 0.11 MG/G{CREAT}

## 2023-11-21 PROCEDURE — 84156 ASSAY OF PROTEIN URINE: CPT | Performed by: OBSTETRICS & GYNECOLOGY

## 2023-11-21 PROCEDURE — 82570 ASSAY OF URINE CREATININE: CPT | Performed by: OBSTETRICS & GYNECOLOGY

## 2023-11-27 ENCOUNTER — HOSPITAL ENCOUNTER (OUTPATIENT)
Dept: LABOR AND DELIVERY | Facility: HOSPITAL | Age: 28
Discharge: HOME | End: 2023-11-27
Payer: COMMERCIAL

## 2023-11-27 ENCOUNTER — HOSPITAL ENCOUNTER (OUTPATIENT)
Facility: HOSPITAL | Age: 28
Discharge: HOME | End: 2023-11-27
Attending: STUDENT IN AN ORGANIZED HEALTH CARE EDUCATION/TRAINING PROGRAM | Admitting: STUDENT IN AN ORGANIZED HEALTH CARE EDUCATION/TRAINING PROGRAM
Payer: COMMERCIAL

## 2023-11-27 VITALS
TEMPERATURE: 98.2 F | DIASTOLIC BLOOD PRESSURE: 75 MMHG | OXYGEN SATURATION: 99 % | SYSTOLIC BLOOD PRESSURE: 109 MMHG | HEART RATE: 106 BPM | RESPIRATION RATE: 18 BRPM

## 2023-11-27 PROCEDURE — 59025 FETAL NON-STRESS TEST: CPT

## 2023-11-27 NOTE — NON STRESS TEST
Obstetrical Non-stress Test Interpretation     Name:  Shruti Pearl  MRN: [id number]    28 y.o. female  at 35w0d    Indication: History of placental abruption        Fetal Assessment  Fetal Movement: active  Fetal HR Assessment Method: external  Fetal HR (beats/min): 130  Fetal HR Baseline: normal range  Fetal HR Variability: moderate (amplitude range 6 to 25 bpm)  Fetal HR Accelerations: episodic, greater than/equal to 15 bpm, lasting at least 15 seconds  Fetal HR Decelerations: absent  Sinusoidal Pattern Present: absent  Fetal HR Tracing Category: Category I    /75 (BP Location: Right arm, Patient Position: Sitting)   Pulse 106   Temp 98.2 °F (36.8 °C) (Oral)   Resp 18   LMP 2023 (Exact Date)   SpO2 99%     Reason for test: Antepartum (History of placental abruption)  Date of Test: 2023  Time frame of test: 5804-9232  RN NST Interpretation: Jamie Thomas RN  2023  13:57 EST

## 2023-12-04 ENCOUNTER — HOSPITAL ENCOUNTER (OUTPATIENT)
Facility: HOSPITAL | Age: 28
Discharge: HOME | End: 2023-12-04
Attending: OBSTETRICS & GYNECOLOGY | Admitting: OBSTETRICS & GYNECOLOGY
Payer: COMMERCIAL

## 2023-12-04 ENCOUNTER — HOSPITAL ENCOUNTER (OUTPATIENT)
Dept: LABOR AND DELIVERY | Facility: HOSPITAL | Age: 28
Discharge: HOME | End: 2023-12-04
Payer: COMMERCIAL

## 2023-12-04 VITALS — HEART RATE: 101 BPM | RESPIRATION RATE: 16 BRPM | SYSTOLIC BLOOD PRESSURE: 109 MMHG | DIASTOLIC BLOOD PRESSURE: 75 MMHG

## 2023-12-04 PROCEDURE — 59025 FETAL NON-STRESS TEST: CPT | Performed by: OBSTETRICS & GYNECOLOGY

## 2023-12-04 PROCEDURE — 59025 FETAL NON-STRESS TEST: CPT

## 2023-12-04 RX ORDER — PANTOPRAZOLE SODIUM 20 MG/1
1 TABLET, DELAYED RELEASE ORAL DAILY
COMMUNITY
Start: 2023-11-28

## 2023-12-04 NOTE — NON STRESS TEST
Obstetrical Non-stress Test Interpretation     Name:  Shruti Pearl  MRN: [id number]    28 y.o. female  at 36w0d    Indication: HX Abruption      Fetal Assessment  Fetal Movement: active  Fetal HR Assessment Method: external  Fetal HR (beats/min): 125  Fetal HR Baseline: normal range  Fetal HR Variability: moderate (amplitude range 6 to 25 bpm)  Sinusoidal Pattern Present: absent  Fetal HR Tracing Category: Category I    /75 (BP Location: Right arm, Patient Position: Lying)   Pulse 101   Resp 16   LMP 2023 (Exact Date)     Reason for test: OB Triage  Date of Test: 2023  Time frame of test: 3853-4364  RN NST Interpretation: Reactive      Frida Gaming RN  2023  14:13 EST

## 2023-12-04 NOTE — NON STRESS TEST
ANNITA Rothman   OB NST Note    2023   Name:  Shruti Pearl  MRN: [id number]    Subjective:  28 y.o.  at 36w0d    Indication: History of placental abruption    NST:   Baseline: 125  Variability:   Moderate/Normal (amplitude 6-25 bpm)  Accelerations: Present (32 weeks+) 15 x 15 bpm  Decelerations: Absent   Contractions:  Not regular    NST interpretation: reactive    Documented Vitals    23 1356   BP: 109/75   Pulse: 101   Resp: 16         Lab Results (last 24 hours)       ** No results found for the last 24 hours. **             Assessment:  28 y.o.  AT 36w0d  History of placental abruption    Plan:   OB Precautions, [facility]KC, Keep scheduled NSTs, Keep office visit      Electronically signed by Wolfgang Uribe MD, 23, 5:14 PM EST.

## 2023-12-05 ENCOUNTER — ROUTINE PRENATAL (OUTPATIENT)
Dept: OBSTETRICS AND GYNECOLOGY | Facility: CLINIC | Age: 28
End: 2023-12-05
Payer: COMMERCIAL

## 2023-12-05 VITALS — WEIGHT: 230.4 LBS | DIASTOLIC BLOOD PRESSURE: 76 MMHG | SYSTOLIC BLOOD PRESSURE: 112 MMHG | BODY MASS INDEX: 39.55 KG/M2

## 2023-12-05 DIAGNOSIS — Z98.891 PREVIOUS CESAREAN SECTION: ICD-10-CM

## 2023-12-05 DIAGNOSIS — F31.81 BIPOLAR II DISORDER: ICD-10-CM

## 2023-12-05 DIAGNOSIS — Z34.80 SUPERVISION OF OTHER NORMAL PREGNANCY, ANTEPARTUM: Primary | ICD-10-CM

## 2023-12-05 DIAGNOSIS — O09.899 RUBELLA NON-IMMUNE STATUS, ANTEPARTUM: ICD-10-CM

## 2023-12-05 DIAGNOSIS — Z87.59 HISTORY OF PLACENTAL ABRUPTION: ICD-10-CM

## 2023-12-05 DIAGNOSIS — Z28.39 RUBELLA NON-IMMUNE STATUS, ANTEPARTUM: ICD-10-CM

## 2023-12-05 DIAGNOSIS — O09.899 HX OF PRETERM DELIVERY, CURRENTLY PREGNANT: ICD-10-CM

## 2023-12-05 LAB
DEPRECATED RDW RBC AUTO: 37 FL (ref 37–54)
ERYTHROCYTE [DISTWIDTH] IN BLOOD BY AUTOMATED COUNT: 13.3 % (ref 12.3–15.4)
GLUCOSE UR STRIP-MCNC: NEGATIVE MG/DL
HCT VFR BLD AUTO: 33.1 % (ref 34–46.6)
HGB BLD-MCNC: 10.6 G/DL (ref 12–15.9)
MCH RBC QN AUTO: 24.5 PG (ref 26.6–33)
MCHC RBC AUTO-ENTMCNC: 32 G/DL (ref 31.5–35.7)
MCV RBC AUTO: 76.6 FL (ref 79–97)
PLATELET # BLD AUTO: 202 10*3/MM3 (ref 140–450)
PMV BLD AUTO: 11 FL (ref 6–12)
PROT UR STRIP-MCNC: ABNORMAL MG/DL
RBC # BLD AUTO: 4.32 10*6/MM3 (ref 3.77–5.28)
WBC NRBC COR # BLD AUTO: 7.22 10*3/MM3 (ref 3.4–10.8)

## 2023-12-05 PROCEDURE — 87653 STREP B DNA AMP PROBE: CPT | Performed by: OBSTETRICS & GYNECOLOGY

## 2023-12-05 PROCEDURE — 85027 COMPLETE CBC AUTOMATED: CPT | Performed by: OBSTETRICS & GYNECOLOGY

## 2023-12-05 NOTE — PATIENT INSTRUCTIONS
Venipuncture Blood Specimen Collection  Venipuncture performed in left arm by Una Babcock with good hemostasis. Patient tolerated the procedure well without complications.   12/05/23   Una Babcock

## 2023-12-05 NOTE — PROGRESS NOTES
Routine Prenatal Visit     Subjective  Shruti eParl is a 28 y.o.  at 36w1d here for her routine OB visit.   She is taking her prenatal vitamins.Reports no loss of fluid or vaginal bleeding. Patient is doing well and denies any complaints.    Patient Active Problem List   Diagnosis    Bipolar II disorder    ADHD (attention deficit hyperactivity disorder), combined type    Post traumatic stress disorder (PTSD)    Marijuana use    Cervical high risk HPV (human papillomavirus) test positive    Supervision of other normal pregnancy, antepartum    Previous  section    Hx of  delivery, currently pregnant    Rubella non-immune status, antepartum    History of placental abruption    Abnormal GTT (glucose tolerance test)         OB History    Para Term  AB Living   3 1   1 1 1   SAB IAB Ectopic Molar Multiple Live Births       1     1      # Outcome Date GA Lbr Olegario/2nd Weight Sex Delivery Anes PTL Lv   3 Current            2 Ectopic 2018 8w0d    ECTOPIC      1  14 35w5d  2929 g (6 lb 7.3 oz) M CS-LTranv Spinal Y CINDY           ROS:   General ROS: negative for - chills or fatigue  Respiratory ROS: negative for - cough or hemoptysis  Cardiovascular ROS: negative for - chest pain or dyspnea on exertion  Genito-Urinary ROS: negative for  change in urinary stream, vaginal discharge   Musculoskeletal ROS: negative for - gait disturbance or joint pain  Dermatological ROS: negative for acne,  dry skin or itching    Objective  Physical Exam:   Vitals:    23 1305   BP: 112/76       Uterine Size: size equals dates  FHT: 110-160 BPM    General appearance - alert, well appearing, and in no distress  Mental status - alert, oriented to person, place, and time  Abdomen- Soft, Gravid uterus, non-tender to palpation  Musculoskeletal: negative for - gait disturbance or joint pain  Extremeties: negative swelling or cyanosis   Dermatological: negative rashes or skin lesions      Assessment/Plan:   Diagnoses and all orders for this visit:    1. Supervision of other normal pregnancy, antepartum (Primary)  Assessment & Plan:  PROBLEM LIST/PLAN:   Hx of  labor/delivery:   labor at 31 and 33 weeks with first pregnancy, delivered at 35w5d due to abruption  Previous  - unsure if wants to have a TOLAC- 52%, recommend repeat   Obesity-BMI >35- testing 37 weeks   Bipolar 2-NO meds, stable   Rubella nonimmune-MMR postpartum     Orders:  -     CBC (No Diff)  -     Group B Strep (Molecular) - Swab, Vaginal/Rectum  -     POC Urinalysis Dipstick    2. History of placental abruption    3. Hx of  delivery, currently pregnant    4. Previous  section    5. Rubella non-immune status, antepartum    6. Bipolar II disorder            Counseling:   OB precautions, leaking, VB, radha acosta vs PTL/Labor  [facility]  HTN precautions reviewed: HA, vision change, RUQ/epigastric pain, edema  Round Ligament Pain:  The uterus has several ligaments which provide support and keep the uterus in place. As the  uterus grows these ligaments are pulled and stretched which often causes sharp stabbing like pain in the inguinal area.   You may find a pregnancy support band helpful. Changing positions may also help. Yoga is a great way to cope with round ligament and low back pain in pregnancy.    Massage may also help with low back pain   Things to Consider at this Point in your Pregnancy:  Some women experience swelling in their feet during pregnancy. Compression stockings may help  Drink plenty of water and stay active   Make sure you are eating frequent small meals, nuts are a wonderful snack to keep with you            Return in about 1 week (around 2023) for Routine OB visit.      We have gone over prenatal care to include the timing and content of visits. I informed her how to contact the office and/or on call person in the event of any problems and encouraged her to do  so when she feels it is necessary.  We then spent time answering her questions which she indicated were answered to her satisfaction.    Leslie Kessler DO  12/5/2023 13:37 EST

## 2023-12-05 NOTE — ASSESSMENT & PLAN NOTE
PROBLEM LIST/PLAN:   Hx of  labor/delivery:   labor at 31 and 33 weeks with first pregnancy, delivered at 35w5d due to abruption  Previous  - unsure if wants to have a TOLAC- 52%, recommend repeat   Obesity-BMI >35- testing 37 weeks   Bipolar 2-NO meds, stable   Rubella nonimmune-MMR postpartum

## 2023-12-06 LAB — GROUP B STREP, DNA: NEGATIVE

## 2023-12-08 ENCOUNTER — TELEPHONE (OUTPATIENT)
Dept: OBSTETRICS AND GYNECOLOGY | Facility: CLINIC | Age: 28
End: 2023-12-08
Payer: COMMERCIAL

## 2023-12-08 RX ORDER — FERROUS SULFATE 325(65) MG
1 TABLET ORAL DAILY
Qty: 90 TABLET | Refills: 0 | OUTPATIENT
Start: 2023-12-08

## 2023-12-08 RX ORDER — FERROUS SULFATE 325(65) MG
325 TABLET ORAL EVERY OTHER DAY
Qty: 30 TABLET | Refills: 1 | Status: SHIPPED | OUTPATIENT
Start: 2023-12-08

## 2023-12-08 NOTE — TELEPHONE ENCOUNTER
----- Message from Leslie Kessler DO sent at 12/6/2023  7:30 AM EST -----  Iron daily 325mg.     Electronically signed by:    Leslie Kessler DO  12/06/23  07:30 EST

## 2023-12-11 ENCOUNTER — HOSPITAL ENCOUNTER (OUTPATIENT)
Facility: HOSPITAL | Age: 28
Discharge: HOME | End: 2023-12-11
Attending: STUDENT IN AN ORGANIZED HEALTH CARE EDUCATION/TRAINING PROGRAM | Admitting: STUDENT IN AN ORGANIZED HEALTH CARE EDUCATION/TRAINING PROGRAM
Payer: COMMERCIAL

## 2023-12-11 ENCOUNTER — HOSPITAL ENCOUNTER (OUTPATIENT)
Dept: LABOR AND DELIVERY | Facility: HOSPITAL | Age: 28
Discharge: HOME | End: 2023-12-11
Payer: COMMERCIAL

## 2023-12-11 VITALS — DIASTOLIC BLOOD PRESSURE: 70 MMHG | HEART RATE: 92 BPM | SYSTOLIC BLOOD PRESSURE: 110 MMHG | RESPIRATION RATE: 18 BRPM

## 2023-12-11 PROCEDURE — 59025 FETAL NON-STRESS TEST: CPT

## 2023-12-11 PROCEDURE — 59025 FETAL NON-STRESS TEST: CPT | Performed by: STUDENT IN AN ORGANIZED HEALTH CARE EDUCATION/TRAINING PROGRAM

## 2023-12-11 NOTE — PROGRESS NOTES
Routine Prenatal Visit     Subjective  Shruti Pearl is a 28 y.o.  at 37w1d here for her routine OB visit.   She is taking her prenatal vitamins.Reports no loss of fluid or vaginal bleeding. Patient doing well without any complaints. Pregnancy complicated by:     Patient Active Problem List   Diagnosis    Bipolar II disorder    ADHD (attention deficit hyperactivity disorder), combined type    Post traumatic stress disorder (PTSD)    Marijuana use    Cervical high risk HPV (human papillomavirus) test positive    Supervision of other normal pregnancy, antepartum    Previous  section    Hx of  delivery, currently pregnant    Rubella non-immune status, antepartum    History of placental abruption    Abnormal GTT (glucose tolerance test)         OB History    Para Term  AB Living   3 1   1 1 1   SAB IAB Ectopic Molar Multiple Live Births       1     1      # Outcome Date GA Lbr Olegario/2nd Weight Sex Delivery Anes PTL Lv   3 Current            2 Ectopic 2018 8w0d    ECTOPIC      1  14 35w5d  2929 g (6 lb 7.3 oz) M CS-LTranv Spinal Y CINDY           ROS:   General ROS: negative for - chills or fatigue  Respiratory ROS: negative for - cough or hemoptysis  Cardiovascular ROS: negative for - chest pain or dyspnea on exertion  Genito-Urinary ROS: negative for  change in urinary stream, vaginal discharge   Musculoskeletal ROS: negative for - gait disturbance or joint pain  Dermatological ROS: negative for acne,  dry skin or itching    Objective  Physical Exam:   Vitals:    23 0820   BP: 114/82       Uterine Size: size equals dates  FHT: 110-160 BPM    General appearance - alert, well appearing, and in no distress  Mental status - alert, oriented to person, place, and time  Abdomen- Soft, Gravid uterus, non-tender to palpation  Musculoskeletal: negative for - gait disturbance or joint pain  Extremeties: negative swelling or cyanosis   Dermatological: negative rashes or skin  lesions       Assessment/Plan:   Diagnoses and all orders for this visit:    1. Supervision of other normal pregnancy, antepartum (Primary)  Assessment & Plan:  PROBLEM LIST/PLAN:   Hx of  labor/delivery:   labor at 31 and 33 weeks with first pregnancy, delivered at 35w5d due to abruption  Previous  - unsure if wants to have a TOLAC- 52%, recommend repeat   Obesity-BMI >35- testing 37 weeks   Bipolar 2-NO meds, stable   Rubella nonimmune-MMR postpartum     Orders:  -     POC Urinalysis Dipstick    2. History of placental abruption    3. Hx of  delivery, currently pregnant    4. Previous  section    5. Rubella non-immune status, antepartum    6. Bipolar II disorder  Assessment & Plan:  Stable, no meds               Counseling:   OB precautions, leaking, VB, radha acosta vs PTL/Labor  [facility]C  HTN precautions reviewed: HA, vision change, RUQ/epigastric pain, edema  Round Ligament Pain:  The uterus has several ligaments which provide support and keep the uterus in place. As the  uterus grows these ligaments are pulled and stretched which often causes sharp stabbing like pain in the inguinal area.   You may find a pregnancy support band helpful. Changing positions may also help. Yoga is a great way to cope with round ligament and low back pain in pregnancy.    Massage may also help with low back pain   Things to Consider at this Point in your Pregnancy:  Some women experience swelling in their feet during pregnancy. Compression stockings may help  Drink plenty of water and stay active   Make sure you are eating frequent small meals, nuts are a wonderful snack to keep with you            Return in about 1 week (around 2023) for Routine OB visit.      We have gone over prenatal care to include the timing and content of visits. I informed her how to contact the office and/or on call person in the event of any problems and encouraged her to do so when she feels it is  necessary.  We then spent time answering her questions which she indicated were answered to her satisfaction.    Leslie Kessler,   12/12/2023 08:40 EST

## 2023-12-11 NOTE — NON STRESS TEST
Obstetrical Non-stress Test Interpretation     Name:  Shruti Pearl  MRN: [id number]    28 y.o. female  at 37w0d    Indication: hx of placental abruption       Fetal Movement: active  Fetal HR Assessment Method: external  Fetal HR (beats/min): 125  Fetal HR Baseline: normal range  Fetal HR Variability: moderate (amplitude range 6 to 25 bpm)  Fetal HR Accelerations: episodic, greater than/equal to 15 bpm, lasting at least 15 seconds  Fetal HR Decelerations: absent  Sinusoidal Pattern Present: absent    /70 (BP Location: Right arm, Patient Position: Sitting)   Pulse 92   Resp 18   LMP 2023 (Exact Date)     Reason for test: Other (Comment) (hx of placental abruption)  Date of Test: 2023  Time frame of test: 6736-9428  RN NST Interpretation: Reactive      Tiffanie Sommer RN  2023  13:27 EST

## 2023-12-12 ENCOUNTER — ROUTINE PRENATAL (OUTPATIENT)
Dept: OBSTETRICS AND GYNECOLOGY | Facility: CLINIC | Age: 28
End: 2023-12-12
Payer: COMMERCIAL

## 2023-12-12 VITALS — WEIGHT: 230.8 LBS | SYSTOLIC BLOOD PRESSURE: 114 MMHG | DIASTOLIC BLOOD PRESSURE: 82 MMHG | BODY MASS INDEX: 39.62 KG/M2

## 2023-12-12 DIAGNOSIS — O09.899 HX OF PRETERM DELIVERY, CURRENTLY PREGNANT: ICD-10-CM

## 2023-12-12 DIAGNOSIS — O09.899 RUBELLA NON-IMMUNE STATUS, ANTEPARTUM: ICD-10-CM

## 2023-12-12 DIAGNOSIS — Z28.39 RUBELLA NON-IMMUNE STATUS, ANTEPARTUM: ICD-10-CM

## 2023-12-12 DIAGNOSIS — Z34.80 SUPERVISION OF OTHER NORMAL PREGNANCY, ANTEPARTUM: Primary | ICD-10-CM

## 2023-12-12 DIAGNOSIS — F31.81 BIPOLAR II DISORDER: ICD-10-CM

## 2023-12-12 DIAGNOSIS — Z87.59 HISTORY OF PLACENTAL ABRUPTION: ICD-10-CM

## 2023-12-12 DIAGNOSIS — Z98.891 PREVIOUS CESAREAN SECTION: ICD-10-CM

## 2023-12-12 LAB
GLUCOSE UR STRIP-MCNC: NEGATIVE MG/DL
PROT UR STRIP-MCNC: ABNORMAL MG/DL

## 2023-12-18 ENCOUNTER — HOSPITAL ENCOUNTER (OUTPATIENT)
Facility: HOSPITAL | Age: 28
Discharge: HOME | End: 2023-12-18
Attending: OBSTETRICS & GYNECOLOGY | Admitting: OBSTETRICS & GYNECOLOGY
Payer: COMMERCIAL

## 2023-12-18 ENCOUNTER — HOSPITAL ENCOUNTER (OUTPATIENT)
Dept: LABOR AND DELIVERY | Facility: HOSPITAL | Age: 28
Discharge: HOME | End: 2023-12-18
Payer: COMMERCIAL

## 2023-12-18 VITALS — HEART RATE: 113 BPM | SYSTOLIC BLOOD PRESSURE: 115 MMHG | RESPIRATION RATE: 18 BRPM | DIASTOLIC BLOOD PRESSURE: 74 MMHG

## 2023-12-18 PROCEDURE — G0463 HOSPITAL OUTPT CLINIC VISIT: HCPCS

## 2023-12-18 PROCEDURE — 59025 FETAL NON-STRESS TEST: CPT

## 2023-12-18 NOTE — NON STRESS TEST
Obstetrical Non-stress Test Interpretation     Name:  Shruti Pearl  MRN: [id number]    28 y.o. female  at 38w0d    Indication: NST/HX OF ABRUPTION      Fetal Assessment  Fetal Movement: active  Fetal HR Assessment Method: external  Fetal HR (beats/min): 135  Fetal HR Baseline: normal range  Fetal HR Variability: moderate (amplitude range 6 to 25 bpm)  Fetal HR Accelerations: lasting at least 15 seconds  Fetal HR Decelerations: absent    /74 (BP Location: Right arm, Patient Position: Lying)   Pulse 113   Resp 18   LMP 2023 (Exact Date)     Reason for test: OB Triage (NST/HX OF ABRUPTION)  Date of Test: 2023  Time frame of test: 5308-7151  RN NST Interpretation: Reactive      Faith Jo RN  2023  13:43 EST

## 2023-12-18 NOTE — PROGRESS NOTES
Routine Prenatal Visit     Subjective  Shruti Pearl is a 28 y.o.  at 38w1d here for her routine OB visit.   She is taking her prenatal vitamins.Reports no loss of fluid or vaginal bleeding. Patient doing well without any complaints. Pregnancy complicated by:     Patient Active Problem List   Diagnosis    Bipolar II disorder    ADHD (attention deficit hyperactivity disorder), combined type    Post traumatic stress disorder (PTSD)    Marijuana use    Cervical high risk HPV (human papillomavirus) test positive    Supervision of other normal pregnancy, antepartum    Previous  section    Hx of  delivery, currently pregnant    Rubella non-immune status, antepartum    History of placental abruption    Abnormal GTT (glucose tolerance test)         OB History    Para Term  AB Living   3 1   1 1 1   SAB IAB Ectopic Molar Multiple Live Births       1     1      # Outcome Date GA Lbr Olegario/2nd Weight Sex Delivery Anes PTL Lv   3 Current            2 Ectopic 2018 8w0d    ECTOPIC      1  14 35w5d  2929 g (6 lb 7.3 oz) M CS-LTranv Spinal Y CINDY           ROS:   General ROS: negative for - chills or fatigue  Respiratory ROS: negative for - cough or hemoptysis  Cardiovascular ROS: negative for - chest pain or dyspnea on exertion  Genito-Urinary ROS: negative for  change in urinary stream, vaginal discharge   Musculoskeletal ROS: negative for - gait disturbance or joint pain  Dermatological ROS: negative for acne,  dry skin or itching    Objective  Physical Exam:   Vitals:    23 1029   BP: 116/78       Uterine Size: not examined, US today  FHT: 110-160 BPM    General appearance - alert, well appearing, and in no distress  Mental status - alert, oriented to person, place, and time  Abdomen- Soft, Gravid uterus, non-tender to palpation  Musculoskeletal: negative for - gait disturbance or joint pain  Extremeties: negative swelling or cyanosis   Dermatological: negative rashes or  skin lesions       Assessment/Plan:   Diagnoses and all orders for this visit:    1. Supervision of other normal pregnancy, antepartum (Primary)  Assessment & Plan:  PROBLEM LIST/PLAN:   Hx of  labor/delivery:   labor at 31 and 33 weeks with first pregnancy, delivered at 35w5d due to abruption  Previous  - unsure if wants to have a TOLAC- 52%, recommend repeat   Obesity-BMI >35- testing 37 weeks   Bipolar 2-NO meds, stable   Rubella nonimmune-MMR postpartum     Orders:  -     POC Urinalysis Dipstick    2. History of placental abruption    3. Hx of  delivery, currently pregnant    4. Previous  section    5. Rubella non-immune status, antepartum            Counseling:   OB precautions, leaking, VB, radha acosta vs PTL/Labor  [facility]C  HTN precautions reviewed: HA, vision change, RUQ/epigastric pain, edema  CS scheduled  CS reviewed in detail.  All history reviewed and updated.  Preop exam performed.  See separate scanned in counseling note.  All questions answered.  She desires to proceed as planned.   Round Ligament Pain:  The uterus has several ligaments which provide support and keep the uterus in place. As the  uterus grows these ligaments are pulled and stretched which often causes sharp stabbing like pain in the inguinal area.   You may find a pregnancy support band helpful. Changing positions may also help. Yoga is a great way to cope with round ligament and low back pain in pregnancy.    Massage may also help with low back pain   Things to Consider at this Point in your Pregnancy:  Some women experience swelling in their feet during pregnancy. Compression stockings may help  Drink plenty of water and stay active   Make sure you are eating frequent small meals, nuts are a wonderful snack to keep with you            Return in about 2 weeks (around 2024) for Incision check.      We have gone over prenatal care to include the timing and content of visits. I  informed her how to contact the office and/or on call person in the event of any problems and encouraged her to do so when she feels it is necessary.  We then spent time answering her questions which she indicated were answered to her satisfaction.    Leslie Kessler DO  12/19/2023 10:45 EST

## 2023-12-19 ENCOUNTER — ROUTINE PRENATAL (OUTPATIENT)
Dept: OBSTETRICS AND GYNECOLOGY | Facility: CLINIC | Age: 28
End: 2023-12-19
Payer: COMMERCIAL

## 2023-12-19 VITALS — SYSTOLIC BLOOD PRESSURE: 116 MMHG | WEIGHT: 231.4 LBS | BODY MASS INDEX: 39.72 KG/M2 | DIASTOLIC BLOOD PRESSURE: 78 MMHG

## 2023-12-19 DIAGNOSIS — Z34.80 SUPERVISION OF OTHER NORMAL PREGNANCY, ANTEPARTUM: Primary | ICD-10-CM

## 2023-12-19 DIAGNOSIS — Z28.39 RUBELLA NON-IMMUNE STATUS, ANTEPARTUM: ICD-10-CM

## 2023-12-19 DIAGNOSIS — O09.899 RUBELLA NON-IMMUNE STATUS, ANTEPARTUM: ICD-10-CM

## 2023-12-19 DIAGNOSIS — Z87.59 HISTORY OF PLACENTAL ABRUPTION: ICD-10-CM

## 2023-12-19 DIAGNOSIS — O09.899 HX OF PRETERM DELIVERY, CURRENTLY PREGNANT: ICD-10-CM

## 2023-12-19 DIAGNOSIS — Z98.891 PREVIOUS CESAREAN SECTION: ICD-10-CM

## 2023-12-19 LAB
GLUCOSE UR STRIP-MCNC: NEGATIVE MG/DL
PROT UR STRIP-MCNC: ABNORMAL MG/DL

## 2023-12-22 ENCOUNTER — ANESTHESIA EVENT (OUTPATIENT)
Dept: LABOR AND DELIVERY | Facility: HOSPITAL | Age: 28
End: 2023-12-22
Payer: COMMERCIAL

## 2023-12-22 ENCOUNTER — HOSPITAL ENCOUNTER (INPATIENT)
Facility: HOSPITAL | Age: 28
LOS: 2 days | Discharge: HOME | End: 2023-12-24
Attending: OBSTETRICS & GYNECOLOGY | Admitting: OBSTETRICS & GYNECOLOGY
Payer: COMMERCIAL

## 2023-12-22 ENCOUNTER — ANESTHESIA (OUTPATIENT)
Dept: LABOR AND DELIVERY | Facility: HOSPITAL | Age: 28
End: 2023-12-22
Payer: COMMERCIAL

## 2023-12-22 ENCOUNTER — ANESTHESIA EVENT CONVERTED (OUTPATIENT)
Dept: ANESTHESIOLOGY | Facility: HOSPITAL | Age: 28
End: 2023-12-22
Payer: COMMERCIAL

## 2023-12-22 DIAGNOSIS — Z98.891 PREVIOUS CESAREAN SECTION: Primary | ICD-10-CM

## 2023-12-22 LAB
ABO GROUP BLD: NORMAL
ALBUMIN SERPL-MCNC: 3.3 G/DL (ref 3.5–5.2)
ALBUMIN/GLOB SERPL: 0.9 G/DL
ALP SERPL-CCNC: 118 U/L (ref 39–117)
ALT SERPL W P-5'-P-CCNC: 12 U/L (ref 1–33)
AMPHET+METHAMPHET UR QL: NEGATIVE
ANION GAP SERPL CALCULATED.3IONS-SCNC: 14.7 MMOL/L (ref 5–15)
AST SERPL-CCNC: 13 U/L (ref 1–32)
BARBITURATES UR QL SCN: NEGATIVE
BASE EXCESS BLDCOV CALC-SCNC: NORMAL MMOL/L
BENZODIAZ UR QL SCN: NEGATIVE
BILIRUB BLD-MCNC: NEGATIVE MG/DL
BILIRUB SERPL-MCNC: 0.7 MG/DL (ref 0–1.2)
BLD GP AB SCN SERPL QL: NEGATIVE
BUN SERPL-MCNC: 7 MG/DL (ref 6–20)
BUN/CREAT SERPL: 11.7 (ref 7–25)
CALCIUM SPEC-SCNC: 9.2 MG/DL (ref 8.6–10.5)
CANNABINOIDS SERPL QL: NEGATIVE
CHLORIDE SERPL-SCNC: 102 MMOL/L (ref 98–107)
CLARITY, POC: ABNORMAL
CO2 SERPL-SCNC: 17.3 MMOL/L (ref 22–29)
COCAINE UR QL: NEGATIVE
COLOR UR: ABNORMAL
CREAT SERPL-MCNC: 0.6 MG/DL (ref 0.57–1)
DEPRECATED RDW RBC AUTO: 39.6 FL (ref 37–54)
EGFRCR SERPLBLD CKD-EPI 2021: 126.3 ML/MIN/1.73
ERYTHROCYTE [DISTWIDTH] IN BLOOD BY AUTOMATED COUNT: 14.8 % (ref 12.3–15.4)
FENTANYL UR-MCNC: NEGATIVE NG/ML
GLOBULIN UR ELPH-MCNC: 3.7 GM/DL
GLUCOSE SERPL-MCNC: 76 MG/DL (ref 65–99)
GLUCOSE UR STRIP-MCNC: NEGATIVE MG/DL
HCO3 BLDCOV-SCNC: NORMAL MMOL/L
HCT VFR BLD AUTO: 34.9 % (ref 34–46.6)
HGB BLD-MCNC: 10.9 G/DL (ref 12–15.9)
KETONES UR QL: NEGATIVE
LEUKOCYTE EST, POC: NEGATIVE
MCH RBC QN AUTO: 23.6 PG (ref 26.6–33)
MCHC RBC AUTO-ENTMCNC: 31.2 G/DL (ref 31.5–35.7)
MCV RBC AUTO: 75.7 FL (ref 79–97)
METHADONE UR QL SCN: NEGATIVE
NITRITE UR-MCNC: NEGATIVE MG/ML
OPIATES UR QL: NEGATIVE
OXYCODONE UR QL SCN: NEGATIVE
PCO2 BLDCOV: NORMAL MM[HG]
PH BLDCOV: NORMAL [PH]
PH UR: 6 [PH] (ref 5–8)
PLATELET # BLD AUTO: 220 10*3/MM3 (ref 140–450)
PMV BLD AUTO: 11 FL (ref 6–12)
PO2 BLDCOV: NORMAL MM[HG]
POTASSIUM SERPL-SCNC: 3.5 MMOL/L (ref 3.5–5.2)
PROT SERPL-MCNC: 7 G/DL (ref 6–8.5)
PROT UR STRIP-MCNC: ABNORMAL MG/DL
RBC # BLD AUTO: 4.61 10*6/MM3 (ref 3.77–5.28)
RBC # UR STRIP: ABNORMAL /UL
RH BLD: POSITIVE
SODIUM SERPL-SCNC: 134 MMOL/L (ref 136–145)
SP GR UR: 1.03 (ref 1–1.03)
T&S EXPIRATION DATE: NORMAL
UROBILINOGEN UR QL: ABNORMAL
WBC NRBC COR # BLD AUTO: 9.49 10*3/MM3 (ref 3.4–10.8)

## 2023-12-22 PROCEDURE — 80307 DRUG TEST PRSMV CHEM ANLYZR: CPT | Performed by: OBSTETRICS & GYNECOLOGY

## 2023-12-22 PROCEDURE — 25010000002 GENTAMICIN PER 80 MG: Performed by: OBSTETRICS & GYNECOLOGY

## 2023-12-22 PROCEDURE — 25010000002 CLINDAMYCIN 900 MG/50ML SOLUTION: Performed by: OBSTETRICS & GYNECOLOGY

## 2023-12-22 PROCEDURE — 85027 COMPLETE CBC AUTOMATED: CPT | Performed by: OBSTETRICS & GYNECOLOGY

## 2023-12-22 PROCEDURE — 81002 URINALYSIS NONAUTO W/O SCOPE: CPT | Performed by: OBSTETRICS & GYNECOLOGY

## 2023-12-22 PROCEDURE — 25010000002 OXYTOCIN PER 10 UNITS: Performed by: NURSE ANESTHETIST, CERTIFIED REGISTERED

## 2023-12-22 PROCEDURE — 51702 INSERT TEMP BLADDER CATH: CPT

## 2023-12-22 PROCEDURE — 25010000002 KETOROLAC TROMETHAMINE PER 15 MG: Performed by: STUDENT IN AN ORGANIZED HEALTH CARE EDUCATION/TRAINING PROGRAM

## 2023-12-22 PROCEDURE — 80053 COMPREHEN METABOLIC PANEL: CPT | Performed by: OBSTETRICS & GYNECOLOGY

## 2023-12-22 PROCEDURE — 25810000003 LACTATED RINGERS PER 1000 ML: Performed by: OBSTETRICS & GYNECOLOGY

## 2023-12-22 PROCEDURE — 25010000002 ONDANSETRON PER 1 MG: Performed by: NURSE ANESTHETIST, CERTIFIED REGISTERED

## 2023-12-22 PROCEDURE — 86901 BLOOD TYPING SEROLOGIC RH(D): CPT | Performed by: OBSTETRICS & GYNECOLOGY

## 2023-12-22 PROCEDURE — 86850 RBC ANTIBODY SCREEN: CPT | Performed by: OBSTETRICS & GYNECOLOGY

## 2023-12-22 PROCEDURE — 25010000002 PHENYLEPHRINE 10 MG/ML SOLUTION: Performed by: NURSE ANESTHETIST, CERTIFIED REGISTERED

## 2023-12-22 PROCEDURE — 25810000003 LACTATED RINGERS SOLUTION: Performed by: OBSTETRICS & GYNECOLOGY

## 2023-12-22 PROCEDURE — 59515 CESAREAN DELIVERY: CPT | Performed by: STUDENT IN AN ORGANIZED HEALTH CARE EDUCATION/TRAINING PROGRAM

## 2023-12-22 PROCEDURE — 25010000002 MORPHINE PER 10 MG: Performed by: STUDENT IN AN ORGANIZED HEALTH CARE EDUCATION/TRAINING PROGRAM

## 2023-12-22 PROCEDURE — 25010000002 KETOROLAC TROMETHAMINE PER 15 MG: Performed by: OBSTETRICS & GYNECOLOGY

## 2023-12-22 PROCEDURE — 25010000002 BUPIVACAINE PF 0.75 % SOLUTION: Performed by: NURSE ANESTHETIST, CERTIFIED REGISTERED

## 2023-12-22 PROCEDURE — 25010000002 FENTANYL CITRATE (PF) 50 MCG/ML SOLUTION: Performed by: NURSE ANESTHETIST, CERTIFIED REGISTERED

## 2023-12-22 PROCEDURE — 0W3R0ZZ CONTROL BLEEDING IN GENITOURINARY TRACT, OPEN APPROACH: ICD-10-PCS | Performed by: STUDENT IN AN ORGANIZED HEALTH CARE EDUCATION/TRAINING PROGRAM

## 2023-12-22 PROCEDURE — 86900 BLOOD TYPING SEROLOGIC ABO: CPT | Performed by: OBSTETRICS & GYNECOLOGY

## 2023-12-22 PROCEDURE — 25010000002 ROPIVACAINE PER 1 MG: Performed by: NURSE ANESTHETIST, CERTIFIED REGISTERED

## 2023-12-22 PROCEDURE — 25010000002 ONDANSETRON PER 1 MG: Performed by: STUDENT IN AN ORGANIZED HEALTH CARE EDUCATION/TRAINING PROGRAM

## 2023-12-22 PROCEDURE — 82803 BLOOD GASES ANY COMBINATION: CPT | Performed by: STUDENT IN AN ORGANIZED HEALTH CARE EDUCATION/TRAINING PROGRAM

## 2023-12-22 DEVICE — SEAL HEMO SURG ARISTA/AH ABS/PWDR 3GM: Type: IMPLANTABLE DEVICE | Site: ABDOMEN | Status: FUNCTIONAL

## 2023-12-22 RX ORDER — HYDROCORTISONE 25 MG/G
CREAM TOPICAL 3 TIMES DAILY PRN
Status: DISCONTINUED | OUTPATIENT
Start: 2023-12-22 | End: 2023-12-24 | Stop reason: HOSPADM

## 2023-12-22 RX ORDER — PRENATAL VIT/IRON FUM/FOLIC AC 27MG-0.8MG
1 TABLET ORAL DAILY
Status: DISCONTINUED | OUTPATIENT
Start: 2023-12-22 | End: 2023-12-24 | Stop reason: HOSPADM

## 2023-12-22 RX ORDER — ONDANSETRON 2 MG/ML
INJECTION INTRAMUSCULAR; INTRAVENOUS AS NEEDED
Status: DISCONTINUED | OUTPATIENT
Start: 2023-12-22 | End: 2023-12-22 | Stop reason: SURG

## 2023-12-22 RX ORDER — SODIUM CHLORIDE, SODIUM LACTATE, POTASSIUM CHLORIDE, CALCIUM CHLORIDE 600; 310; 30; 20 MG/100ML; MG/100ML; MG/100ML; MG/100ML
125 INJECTION, SOLUTION INTRAVENOUS CONTINUOUS
Status: DISCONTINUED | OUTPATIENT
Start: 2023-12-22 | End: 2023-12-22

## 2023-12-22 RX ORDER — LIDOCAINE HYDROCHLORIDE 10 MG/ML
0.5 INJECTION, SOLUTION INFILTRATION; PERINEURAL ONCE AS NEEDED
Status: DISCONTINUED | OUTPATIENT
Start: 2023-12-22 | End: 2023-12-22

## 2023-12-22 RX ORDER — NALOXONE HCL 0.4 MG/ML
0.4 VIAL (ML) INJECTION
Status: DISCONTINUED | OUTPATIENT
Start: 2023-12-22 | End: 2023-12-24 | Stop reason: HOSPADM

## 2023-12-22 RX ORDER — BUPIVACAINE HYDROCHLORIDE 7.5 MG/ML
INJECTION, SOLUTION EPIDURAL; RETROBULBAR
Status: COMPLETED | OUTPATIENT
Start: 2023-12-22 | End: 2023-12-22

## 2023-12-22 RX ORDER — KETOROLAC TROMETHAMINE 15 MG/ML
30 INJECTION, SOLUTION INTRAMUSCULAR; INTRAVENOUS ONCE
Status: COMPLETED | OUTPATIENT
Start: 2023-12-22 | End: 2023-12-22

## 2023-12-22 RX ORDER — OXYTOCIN/0.9 % SODIUM CHLORIDE 30/500 ML
125 PLASTIC BAG, INJECTION (ML) INTRAVENOUS CONTINUOUS PRN
Status: DISCONTINUED | OUTPATIENT
Start: 2023-12-22 | End: 2023-12-24 | Stop reason: HOSPADM

## 2023-12-22 RX ORDER — OXYCODONE HYDROCHLORIDE 5 MG/1
5 TABLET ORAL EVERY 4 HOURS PRN
Status: DISCONTINUED | OUTPATIENT
Start: 2023-12-22 | End: 2023-12-24 | Stop reason: HOSPADM

## 2023-12-22 RX ORDER — SODIUM CHLORIDE 9 MG/ML
40 INJECTION, SOLUTION INTRAVENOUS AS NEEDED
Status: DISCONTINUED | OUTPATIENT
Start: 2023-12-22 | End: 2023-12-22

## 2023-12-22 RX ORDER — SODIUM CHLORIDE 0.9 % (FLUSH) 0.9 %
10 SYRINGE (ML) INJECTION EVERY 12 HOURS SCHEDULED
Status: DISCONTINUED | OUTPATIENT
Start: 2023-12-22 | End: 2023-12-22

## 2023-12-22 RX ORDER — ACETAMINOPHEN 500 MG
1000 TABLET ORAL ONCE
Status: COMPLETED | OUTPATIENT
Start: 2023-12-22 | End: 2023-12-22

## 2023-12-22 RX ORDER — AMOXICILLIN 250 MG
1 CAPSULE ORAL 2 TIMES DAILY
Status: DISCONTINUED | OUTPATIENT
Start: 2023-12-22 | End: 2023-12-24 | Stop reason: HOSPADM

## 2023-12-22 RX ORDER — ROPIVACAINE HYDROCHLORIDE 2 MG/ML
INJECTION, SOLUTION EPIDURAL; INFILTRATION; PERINEURAL
Status: COMPLETED
Start: 2023-12-22 | End: 2023-12-22

## 2023-12-22 RX ORDER — DIPHENHYDRAMINE HYDROCHLORIDE 50 MG/ML
12.5 INJECTION INTRAMUSCULAR; INTRAVENOUS EVERY 6 HOURS PRN
Status: ACTIVE | OUTPATIENT
Start: 2023-12-22 | End: 2023-12-23

## 2023-12-22 RX ORDER — DIPHENHYDRAMINE HCL 25 MG
25 CAPSULE ORAL EVERY 6 HOURS PRN
Status: ACTIVE | OUTPATIENT
Start: 2023-12-22 | End: 2023-12-23

## 2023-12-22 RX ORDER — MISOPROSTOL 200 UG/1
200 TABLET ORAL EVERY 6 HOURS SCHEDULED
Status: DISCONTINUED | OUTPATIENT
Start: 2023-12-22 | End: 2023-12-22 | Stop reason: HOSPADM

## 2023-12-22 RX ORDER — ACETAMINOPHEN 325 MG/1
650 TABLET ORAL EVERY 6 HOURS
Status: DISCONTINUED | OUTPATIENT
Start: 2023-12-23 | End: 2023-12-24 | Stop reason: HOSPADM

## 2023-12-22 RX ORDER — ONDANSETRON 2 MG/ML
4 INJECTION INTRAMUSCULAR; INTRAVENOUS EVERY 6 HOURS PRN
Status: DISPENSED | OUTPATIENT
Start: 2023-12-22 | End: 2023-12-23

## 2023-12-22 RX ORDER — ONDANSETRON 2 MG/ML
4 INJECTION INTRAMUSCULAR; INTRAVENOUS EVERY 6 HOURS PRN
Status: DISCONTINUED | OUTPATIENT
Start: 2023-12-22 | End: 2023-12-24 | Stop reason: HOSPADM

## 2023-12-22 RX ORDER — PHENYLEPHRINE HYDROCHLORIDE 10 MG/ML
INJECTION INTRAVENOUS AS NEEDED
Status: DISCONTINUED | OUTPATIENT
Start: 2023-12-22 | End: 2023-12-22 | Stop reason: SURG

## 2023-12-22 RX ORDER — ROPIVACAINE HYDROCHLORIDE 5 MG/ML
INJECTION, SOLUTION EPIDURAL; INFILTRATION; PERINEURAL
Status: DISCONTINUED | OUTPATIENT
Start: 2023-12-22 | End: 2023-12-22 | Stop reason: SURG

## 2023-12-22 RX ORDER — ALUMINA, MAGNESIA, AND SIMETHICONE 2400; 2400; 240 MG/30ML; MG/30ML; MG/30ML
15 SUSPENSION ORAL EVERY 4 HOURS PRN
Status: DISCONTINUED | OUTPATIENT
Start: 2023-12-22 | End: 2023-12-24 | Stop reason: HOSPADM

## 2023-12-22 RX ORDER — OXYTOCIN 10 [USP'U]/ML
INJECTION, SOLUTION INTRAMUSCULAR; INTRAVENOUS AS NEEDED
Status: DISCONTINUED | OUTPATIENT
Start: 2023-12-22 | End: 2023-12-22 | Stop reason: SURG

## 2023-12-22 RX ORDER — ROPIVACAINE HYDROCHLORIDE 2 MG/ML
INJECTION, SOLUTION EPIDURAL; INFILTRATION; PERINEURAL
Status: DISCONTINUED | OUTPATIENT
Start: 2023-12-22 | End: 2023-12-22 | Stop reason: SURG

## 2023-12-22 RX ORDER — SODIUM CHLORIDE 0.9 % (FLUSH) 0.9 %
10 SYRINGE (ML) INJECTION AS NEEDED
Status: DISCONTINUED | OUTPATIENT
Start: 2023-12-22 | End: 2023-12-22 | Stop reason: HOSPADM

## 2023-12-22 RX ORDER — CALCIUM CARBONATE 500 MG/1
1 TABLET, CHEWABLE ORAL EVERY 4 HOURS PRN
Status: DISCONTINUED | OUTPATIENT
Start: 2023-12-22 | End: 2023-12-24 | Stop reason: HOSPADM

## 2023-12-22 RX ORDER — SODIUM CHLORIDE 0.9 % (FLUSH) 0.9 %
10 SYRINGE (ML) INJECTION EVERY 12 HOURS SCHEDULED
Status: DISCONTINUED | OUTPATIENT
Start: 2023-12-22 | End: 2023-12-22 | Stop reason: HOSPADM

## 2023-12-22 RX ORDER — SODIUM CHLORIDE 9 MG/ML
40 INJECTION, SOLUTION INTRAVENOUS AS NEEDED
Status: DISCONTINUED | OUTPATIENT
Start: 2023-12-22 | End: 2023-12-22 | Stop reason: HOSPADM

## 2023-12-22 RX ORDER — SIMETHICONE 80 MG
80 TABLET,CHEWABLE ORAL 4 TIMES DAILY PRN
Status: DISCONTINUED | OUTPATIENT
Start: 2023-12-22 | End: 2023-12-24 | Stop reason: HOSPADM

## 2023-12-22 RX ORDER — SODIUM CHLORIDE 0.9 % (FLUSH) 0.9 %
10 SYRINGE (ML) INJECTION AS NEEDED
Status: DISCONTINUED | OUTPATIENT
Start: 2023-12-22 | End: 2023-12-22

## 2023-12-22 RX ORDER — ACETAMINOPHEN 500 MG
1000 TABLET ORAL EVERY 6 HOURS
Status: COMPLETED | OUTPATIENT
Start: 2023-12-22 | End: 2023-12-23

## 2023-12-22 RX ORDER — ONDANSETRON 4 MG/1
4 TABLET, ORALLY DISINTEGRATING ORAL EVERY 6 HOURS PRN
Status: DISCONTINUED | OUTPATIENT
Start: 2023-12-22 | End: 2023-12-24 | Stop reason: HOSPADM

## 2023-12-22 RX ORDER — FENTANYL CITRATE 50 UG/ML
INJECTION, SOLUTION INTRAMUSCULAR; INTRAVENOUS
Status: COMPLETED | OUTPATIENT
Start: 2023-12-22 | End: 2023-12-22

## 2023-12-22 RX ORDER — NALOXONE HCL 0.4 MG/ML
0.4 VIAL (ML) INJECTION ONCE AS NEEDED
Status: ACTIVE | OUTPATIENT
Start: 2023-12-22 | End: 2023-12-23

## 2023-12-22 RX ORDER — FENTANYL CITRATE 50 UG/ML
50 INJECTION, SOLUTION INTRAMUSCULAR; INTRAVENOUS
Status: ACTIVE | OUTPATIENT
Start: 2023-12-22 | End: 2023-12-23

## 2023-12-22 RX ORDER — IBUPROFEN 800 MG/1
800 TABLET ORAL EVERY 8 HOURS SCHEDULED
Status: DISCONTINUED | OUTPATIENT
Start: 2023-12-23 | End: 2023-12-23

## 2023-12-22 RX ORDER — KETOROLAC TROMETHAMINE 15 MG/ML
15 INJECTION, SOLUTION INTRAMUSCULAR; INTRAVENOUS EVERY 6 HOURS
Status: COMPLETED | OUTPATIENT
Start: 2023-12-22 | End: 2023-12-23

## 2023-12-22 RX ORDER — CLINDAMYCIN PHOSPHATE 900 MG/50ML
900 INJECTION, SOLUTION INTRAVENOUS ONCE
Status: COMPLETED | OUTPATIENT
Start: 2023-12-22 | End: 2023-12-22

## 2023-12-22 RX ORDER — FAMOTIDINE 10 MG/ML
20 INJECTION, SOLUTION INTRAVENOUS ONCE
Status: COMPLETED | OUTPATIENT
Start: 2023-12-22 | End: 2023-12-22

## 2023-12-22 RX ORDER — OXYCODONE HYDROCHLORIDE 5 MG/1
10 TABLET ORAL EVERY 4 HOURS PRN
Status: DISCONTINUED | OUTPATIENT
Start: 2023-12-22 | End: 2023-12-24 | Stop reason: HOSPADM

## 2023-12-22 RX ORDER — LIDOCAINE HYDROCHLORIDE 10 MG/ML
0.5 INJECTION, SOLUTION INFILTRATION; PERINEURAL ONCE AS NEEDED
Status: DISCONTINUED | OUTPATIENT
Start: 2023-12-22 | End: 2023-12-22 | Stop reason: HOSPADM

## 2023-12-22 RX ADMIN — PHENYLEPHRINE HYDROCHLORIDE 200 MCG: 10 INJECTION INTRAVENOUS at 08:25

## 2023-12-22 RX ADMIN — CLINDAMYCIN PHOSPHATE 900 MG: 900 INJECTION, SOLUTION INTRAVENOUS at 07:21

## 2023-12-22 RX ADMIN — ONDANSETRON 4 MG: 2 INJECTION INTRAMUSCULAR; INTRAVENOUS at 21:54

## 2023-12-22 RX ADMIN — PHENYLEPHRINE HYDROCHLORIDE 200 MCG: 10 INJECTION INTRAVENOUS at 08:12

## 2023-12-22 RX ADMIN — PHENYLEPHRINE HYDROCHLORIDE 100 MCG: 10 INJECTION INTRAVENOUS at 07:52

## 2023-12-22 RX ADMIN — SODIUM CHLORIDE, POTASSIUM CHLORIDE, SODIUM LACTATE AND CALCIUM CHLORIDE 125 ML/HR: 600; 310; 30; 20 INJECTION, SOLUTION INTRAVENOUS at 09:22

## 2023-12-22 RX ADMIN — GENTAMICIN SULFATE 520 MG: 40 INJECTION, SOLUTION INTRAMUSCULAR; INTRAVENOUS at 07:32

## 2023-12-22 RX ADMIN — SODIUM CHLORIDE, POTASSIUM CHLORIDE, SODIUM LACTATE AND CALCIUM CHLORIDE: 600; 310; 30; 20 INJECTION, SOLUTION INTRAVENOUS at 08:08

## 2023-12-22 RX ADMIN — KETOROLAC TROMETHAMINE 15 MG: 15 INJECTION, SOLUTION INTRAMUSCULAR; INTRAVENOUS at 16:44

## 2023-12-22 RX ADMIN — FAMOTIDINE 20 MG: 10 INJECTION INTRAVENOUS at 07:20

## 2023-12-22 RX ADMIN — SODIUM CHLORIDE, POTASSIUM CHLORIDE, SODIUM LACTATE AND CALCIUM CHLORIDE: 600; 310; 30; 20 INJECTION, SOLUTION INTRAVENOUS at 07:27

## 2023-12-22 RX ADMIN — MORPHINE SULFATE 2 MG: 4 INJECTION, SOLUTION INTRAMUSCULAR; INTRAVENOUS at 21:54

## 2023-12-22 RX ADMIN — OXYCODONE 10 MG: 5 TABLET ORAL at 15:39

## 2023-12-22 RX ADMIN — SODIUM CHLORIDE, POTASSIUM CHLORIDE, SODIUM LACTATE AND CALCIUM CHLORIDE 125 ML/HR: 600; 310; 30; 20 INJECTION, SOLUTION INTRAVENOUS at 04:37

## 2023-12-22 RX ADMIN — ROPIVACAINE HYDROCHLORIDE 30 ML: 2 INJECTION, SOLUTION EPIDURAL; INFILTRATION; PERINEURAL at 08:47

## 2023-12-22 RX ADMIN — VITAMIN A, VITAMIN C, VITAMIN D, VITAMIN E, THIAMINE, RIBOFLAVIN, NIACIN, VITAMIN B6, FOLIC ACID, VITAMIN B12, CALCIUM, IRON, ZINC, COPPER 1 TABLET: 4000; 120; 400; 22; 1.84; 3; 20; 10; 1; 12; 200; 27; 25; 2 TABLET ORAL at 13:32

## 2023-12-22 RX ADMIN — ACETAMINOPHEN 1000 MG: 500 TABLET ORAL at 20:25

## 2023-12-22 RX ADMIN — PHENYLEPHRINE HYDROCHLORIDE 200 MCG: 10 INJECTION INTRAVENOUS at 08:30

## 2023-12-22 RX ADMIN — FENTANYL CITRATE 10 MCG: 50 INJECTION, SOLUTION INTRAMUSCULAR; INTRAVENOUS at 07:46

## 2023-12-22 RX ADMIN — MISOPROSTOL 200 MCG: 200 TABLET ORAL at 12:24

## 2023-12-22 RX ADMIN — PHENYLEPHRINE HYDROCHLORIDE 200 MCG: 10 INJECTION INTRAVENOUS at 08:01

## 2023-12-22 RX ADMIN — ACETAMINOPHEN 1000 MG: 500 TABLET ORAL at 13:32

## 2023-12-22 RX ADMIN — SENNOSIDES AND DOCUSATE SODIUM 1 TABLET: 50; 8.6 TABLET ORAL at 20:26

## 2023-12-22 RX ADMIN — ACETAMINOPHEN 1000 MG: 500 TABLET ORAL at 07:20

## 2023-12-22 RX ADMIN — OXYCODONE 5 MG: 5 TABLET ORAL at 20:25

## 2023-12-22 RX ADMIN — KETOROLAC TROMETHAMINE 30 MG: 15 INJECTION, SOLUTION INTRAMUSCULAR; INTRAVENOUS at 09:53

## 2023-12-22 RX ADMIN — BUPIVACAINE HYDROCHLORIDE 1.4 ML: 7.5 INJECTION, SOLUTION EPIDURAL; RETROBULBAR at 07:46

## 2023-12-22 RX ADMIN — PHENYLEPHRINE HYDROCHLORIDE 100 MCG: 10 INJECTION INTRAVENOUS at 07:49

## 2023-12-22 RX ADMIN — ONDANSETRON 4 MG: 2 INJECTION INTRAMUSCULAR; INTRAVENOUS at 07:41

## 2023-12-22 RX ADMIN — PHENYLEPHRINE HYDROCHLORIDE 100 MCG: 10 INJECTION INTRAVENOUS at 08:04

## 2023-12-22 RX ADMIN — ONDANSETRON 4 MG: 2 INJECTION INTRAMUSCULAR; INTRAVENOUS at 13:35

## 2023-12-22 RX ADMIN — ROPIVACAINE HYDROCHLORIDE 30 ML: 5 INJECTION, SOLUTION EPIDURAL; INFILTRATION; PERINEURAL at 08:53

## 2023-12-22 RX ADMIN — PHENYLEPHRINE HYDROCHLORIDE 200 MCG: 10 INJECTION INTRAVENOUS at 08:16

## 2023-12-22 RX ADMIN — PHENYLEPHRINE HYDROCHLORIDE 200 MCG: 10 INJECTION INTRAVENOUS at 07:58

## 2023-12-22 RX ADMIN — SODIUM CHLORIDE, POTASSIUM CHLORIDE, SODIUM LACTATE AND CALCIUM CHLORIDE 1000 ML: 600; 310; 30; 20 INJECTION, SOLUTION INTRAVENOUS at 04:01

## 2023-12-22 RX ADMIN — PHENYLEPHRINE HYDROCHLORIDE 100 MCG: 10 INJECTION INTRAVENOUS at 08:07

## 2023-12-22 RX ADMIN — PHENYLEPHRINE HYDROCHLORIDE 200 MCG: 10 INJECTION INTRAVENOUS at 08:21

## 2023-12-22 RX ADMIN — SIMETHICONE 80 MG: 80 TABLET, CHEWABLE ORAL at 20:25

## 2023-12-22 RX ADMIN — PHENYLEPHRINE HYDROCHLORIDE 200 MCG: 10 INJECTION INTRAVENOUS at 07:55

## 2023-12-22 RX ADMIN — OXYTOCIN 20 UNITS: 10 INJECTION, SOLUTION INTRAMUSCULAR; INTRAVENOUS at 08:08

## 2023-12-22 NOTE — NURSING NOTE
38.4 arrived ambulatory with significant other with c/o abd cramps increasing in intensity since approx 2200. Pt denies vaginal leaking fluid or bleeding. Abd palpates soft and nontender between mild ctx. Pt states has not felt baby move as much within past few hours. Tibes and u/s placed on abd. SVE as charted. Urine dipstick as charted. Dr. Peralta to be notified.

## 2023-12-22 NOTE — ANESTHESIA PREPROCEDURE EVALUATION
Anesthesia Evaluation     Patient summary reviewed and Nursing notes reviewed   no history of anesthetic complications:   NPO Solid Status: > 2 hours  NPO Liquid Status: > 8 hours           Airway   Mallampati: I  TM distance: >3 FB  Neck ROM: full  No difficulty expected  Dental - normal exam     Pulmonary - negative pulmonary ROS and normal exam    breath sounds clear to auscultation  Cardiovascular - negative cardio ROS and normal exam  Exercise tolerance: good (4-7 METS)    Rhythm: regular  Rate: normal        Neuro/Psych  (+) psychiatric history  GI/Hepatic/Renal/Endo    (+) GERD well controlled    Musculoskeletal (-) negative ROS    Abdominal   (+) obese   Substance History - negative use     OB/GYN    (+) Pregnant        Other - negative ROS       ROS/Med Hx Other: PAT Nursing Notes unavailable.               Anesthesia Plan    ASA 2     spinal     (TAP and Duramorph both discussed)    Anesthetic plan, risks, benefits, and alternatives have been provided, discussed and informed consent has been obtained with: patient.    Plan discussed with CRNA.    CODE STATUS:    Code Status (Patient has no pulse and is not breathing): CPR (Attempt to Resuscitate)  Medical Interventions (Patient has pulse or is breathing): Full

## 2023-12-22 NOTE — ANESTHESIA PROCEDURE NOTES
Spinal Block      Patient location during procedure: OR  Start Time: 12/22/2023 7:43 AM  Stop Time: 12/22/2023 7:47 AM  Performed By  CRNA/CAA: Nilo Sandoval CRNA  Preanesthetic Checklist  Completed: patient identified, IV checked, risks and benefits discussed, surgical consent, monitors and equipment checked, pre-op evaluation and timeout performed  Spinal Block Prep:  Patient Position:sitting  Sterile Tech:cap, gloves, mask and sterile barriers  Prep:Betadine  Patient Monitoring:blood pressure monitoring, continuous pulse oximetry and EKG    Spinal Block Procedure  Approach:midline  Guidance:landmark technique  Location:L3-L4  Needle Type:Pencan  Needle Gauge:24 G  Placement of Spinal needle event:cerebrospinal fluid aspirated  Paresthesia: no  Fluid Appearance:clear  Medications: fentaNYL (SUBLIMAZE) injection - Intrathecal   10 mcg - 12/22/2023 7:46:00 AM  bupivacaine PF (MARCAINE) injection 0.75% - Intrathecal   1.4 mL - 12/22/2023 7:46:00 AM   Post Assessment  Patient Tolerance:patient tolerated the procedure well with no apparent complications  Complications no

## 2023-12-22 NOTE — H&P
ANNITA Rothman  Obstetric History and Physical    Chief Complaint   Patient presents with    Abdominal Cramping       Subjective     Patient is a 28 y.o. female  currently at 38w4d, who presents with complaint of contractions.  Patient reports that the contractions started around 10 PM last night.  No vaginal bleeding.  No loss of fluid.  Positive fetal movements.  Patient received IV fluid hydrations but the contractions persisted.  Cervix did not change during observation but the patient reports that the contractions are more intense.  He has a prior  section and desires elective repeat  section.    Her prenatal care is complicated by obesity and history of THC abuse.  Her previous obstetric/gynecological history is noted for is non-contributory.    The following portions of the patients history were reviewed and updated as appropriate: current medications, allergies, past medical history, past surgical history, past family history, past social history, and problem list .       Prenatal Information:  Prenatal Results       Initial Prenatal Labs       Test Value Reference Range Date Time    Hemoglobin  13.8 g/dL 12.0 - 15.9 23 1557       14.2 g/dL 12.0 - 15.9 06/15/23 1352    Hematocrit  38.4 % 34.0 - 46.6 23 1557       40.5 % 34.0 - 46.6 06/15/23 1352    Platelets  194 10*3/mm3 140 - 450 23 1557       208 10*3/mm3 140 - 450 06/15/23 1352    Rubella IgG  <0.90 index Immune >0.99 06/15/23 1352    Hepatitis B SAg  Non-Reactive  Non-Reactive 06/15/23 1352    Hepatitis C Ab  Non-Reactive  Non-Reactive 06/15/23 1352    RPR  NON-REACTIVE NA  20    T. Pallidum Ab   Non-Reactive  Non-Reactive 06/15/23 1352    ABO  B   06/15/23 1352    Rh  Positive   06/15/23 1352    Antibody Screen  Negative   06/15/23 1352    HIV  Non-Reactive  Non-Reactive 06/15/23 135    Urine Culture  50,000 CFU/mL Normal Urogenital Nicole   23 0848       No growth   23 1930       No growth    07/11/23 1633       No growth   06/15/23 1336    Gonorrhea  Negative  Negative 06/15/23 1336    Chlamydia  Negative  Negative 06/15/23 1336    TSH  1.800 uIU/mL 0.270 - 4.200 01/23/23 0837    HgB A1c         Varicella IgG        HgB Electrophoresis         Cystic fibrosis                   Fetal testing        Test Value Reference Range Date Time    NIPT        MSAFP        AFP-4                  2nd and 3rd Trimester       Test Value Reference Range Date Time    Hemoglobin (repeated)  10.6 g/dL 12.0 - 15.9 12/05/23 1336       12.1 g/dL 12.0 - 15.9 09/26/23 0957       13.1 g/dL 12.0 - 15.9 09/21/23 1537       12.1 g/dL 12.0 - 15.9 09/20/23 1945    Hematocrit (repeated)  33.1 % 34.0 - 46.6 12/05/23 1336       36.3 % 34.0 - 46.6 09/26/23 0957       38.9 % 34.0 - 46.6 09/21/23 1537       36.6 % 34.0 - 46.6 09/20/23 1945    Platelets   202 10*3/mm3 140 - 450 12/05/23 1336       192 10*3/mm3 140 - 450 09/26/23 0957       214 10*3/mm3 140 - 450 09/21/23 1537       203 10*3/mm3 140 - 450 09/20/23 1945       194 10*3/mm3 140 - 450 07/11/23 1557       208 10*3/mm3 140 - 450 06/15/23 1352    GCT  152 mg/dL 65 - 139 09/26/23 0957    Antibody Screen (repeated)  Negative   06/15/23 1352    Third Trimester syphilis screen (repeated)   NON-REACTIVE NA  12/06/20 2000    GTT Fasting  74 mg/dL 65 - 94 10/06/23 0908    GTT 1 Hr  153 mg/dL 65 - 179 10/06/23 1010    GTT 2 Hr  118 mg/dL 65 - 154 10/06/23 1107    GTT 3 Hr  82 mg/dL 65 - 139 10/06/23 1210    Group B Strep  Negative  Negative 12/05/23 1317              Other testing        Test Value Reference Range Date Time    Parvo IgG         CMV IgG                   Drug Screening       Test Value Reference Range Date Time    Amphetamine Screen        Barbiturate Screen        Benzodiazepine Screen        Methadone Screen        Phencyclidine Screen        Opiates Screen        THC Screen        Cocaine Screen        Propoxyphene Screen        Buprenorphine Screen         Methamphetamine Screen        Oxycodone Screen        Tricyclic Antidepressants Screen                  Legend    ^: Historical                          External Prenatal Results       Pregnancy Outside Results - Transcribed From Office Records - See Scanned Records For Details       Test Value Date Time    ABO  B  06/15/23 1352    Rh  Positive  06/15/23 1352    Antibody Screen  Negative  06/15/23 1352    Varicella IgG       Rubella  <0.90 index 06/15/23 1352    Hgb  10.6 g/dL 12/05/23 1336       12.1 g/dL 09/26/23 0957       13.1 g/dL 09/21/23 1537       12.1 g/dL 09/20/23 1945       13.8 g/dL 07/11/23 1557       14.2 g/dL 06/15/23 1352    Hct  33.1 % 12/05/23 1336       36.3 % 09/26/23 0957       38.9 % 09/21/23 1537       36.6 % 09/20/23 1945       38.4 % 07/11/23 1557       40.5 % 06/15/23 1352    Glucose Fasting GTT  74 mg/dL 10/06/23 0908    Glucose Tolerance Test 1 hour  153 mg/dL 10/06/23 1010    Glucose Tolerance Test 3 hour  82 mg/dL 10/06/23 1210    Gonorrhea (discrete)  Negative  06/15/23 1336    Chlamydia (discrete)  Negative  06/15/23 1336    RPR  NON-REACTIVE NA 12/06/20 2000    VDRL       Syphilis Antibody       HBsAg  Non-Reactive  06/15/23 1352    Herpes Simplex Virus PCR       Herpes Simplex VIrus Culture       HIV  Non-Reactive  06/15/23 1352    Hep C RNA Quant PCR       Hep C Antibody  Non-Reactive  06/15/23 1352    AFP       Group B Strep  Negative  12/05/23 1317    GBS Susceptibility to Clindamycin       GBS Susceptibility to Erythromycin       Fetal Fibronectin       Genetic Testing, Maternal Blood                 Drug Screening       Test Value Date Time    Urine Drug Screen       Amphetamine Screen       Barbiturate Screen  Negative  01/23/23 0803    Benzodiazepine Screen  Negative  01/23/23 0803    Methadone Screen  Negative  01/23/23 0803    Phencyclidine Screen       Opiates Screen  Negative  01/23/23 0803    THC Screen  Positive  01/23/23 0803    Cocaine Screen       Propoxyphene Screen        Buprenorphine Screen       Methamphetamine Screen       Oxycodone Screen  Negative  23 0803    Tricyclic Antidepressants Screen                 Legend    ^: Historical                             Past OB History:     OB History    Para Term  AB Living   3 1 0 1 1 1   SAB IAB Ectopic Molar Multiple Live Births   0 0 1 0 0 1      # Outcome Date GA Lbr Olegario/2nd Weight Sex Delivery Anes PTL Lv   3 Current            2 Ectopic 2018 8w0d    ECTOPIC      1  14 35w5d  2929 g (6 lb 7.3 oz) M CS-LTranv Spinal Y CINDY      Apgar1: 6  Apgar5: 9       Past Medical History: Past Medical History:   Diagnosis Date    Allergies     Anxiety     Asthma     Bipolar disorder     Depression     Kidney stone     Panic disorder     Placental abruption 2014    Premature labor after 22 weeks and before 37 weeks without delivery 2014    PTSD (post-traumatic stress disorder)     Status post  delivery 2014      Past Surgical History Past Surgical History:   Procedure Laterality Date     SECTION        Family History: Family History   Problem Relation Age of Onset    Depression Mother     Bipolar disorder Mother     ADD / ADHD Mother     Depression Father     ADD / ADHD Father     Suicide Attempts Sister     Anxiety disorder Sister     ADD / ADHD Sister     Diabetes Maternal Grandfather     Diabetes Other         UNCLE    ADD / ADHD Son       Social History:  reports that she has quit smoking. Her smoking use included cigarettes. She has a 8.00 pack-year smoking history. She has been exposed to tobacco smoke. She has never used smokeless tobacco.   reports that she does not currently use alcohol.   reports that she does not currently use drugs after having used the following drugs: Marijuana.        General ROS: Pertinent items are noted in HPI    Objective       Vital Signs Range for the last 24 hours  Temperature: Temp:  [98.2 °F (36.8 °C)] 98.2 °F (36.8 °C)   Temp Source:  Temp src: Oral   BP: BP: (115)/(81) 115/81   Pulse: Heart Rate:  [98] 98   Respirations: Resp:  [16] 16   SPO2: SpO2:  [99 %] 99 %   O2 Amount (l/min):     O2 Devices     Weight:       Physical Examination: General appearance - alert, well appearing, and in no distress  Mental status - alert, oriented to person, place, and time  Chest - clear to auscultation, no wheezes, rales or rhonchi, symmetric air entry  Heart - normal rate, regular rhythm, normal S1, S2, no murmurs, rubs, clicks or gallops  Abdomen - soft, nontender, gravid  Extremities - peripheral pulses normal, no pedal edema, no clubbing or cyanosis  Skin - normal coloration and turgor, no rashes, no suspicious skin lesions noted    Presentation: Vertex   Cervix: Exam by: Method: sterile exam per RN   Dilation: Cervical Dilation (cm): 1   Effacement: Cervical Effacement: 20-30%   Station:         Fetal Heart Rate Assessment   Method: Fetal HR Assessment Method: external   Beats/min: Fetal HR (beats/min): 130   Baseline: Fetal HR Baseline: normal range   Variability: Fetal HR Variability: moderate (amplitude range 6 to 25 bpm)   Accels: Fetal HR Accelerations: greater than/equal to 15 bpm, lasting at least 15 seconds, episodic   Decels: Fetal HR Decelerations: absent         Uterine Assessment   Method: Method: palpation, external tocotransducer   Frequency (min): Contraction Frequency (Minutes): 2-3   Ctx Count in 10 min:     Duration:     Intensity: Contraction Intensity: mild by palpation       [place] Units:       GBS is negative      Assessment & Plan       Previous  section  Contractions during pregnancy      Assessment:  1.  Intrauterine pregnancy at 38w4d gestation with reactive fetal status.    2.  labor  without ROM  3.  Obstetrical history significant for is non-contributory.  4.  GBS status:   Group B Strep, DNA   Date Value Ref Range Status   2023 Negative Negative Final       Plan:  1. Repeat  scheduled  2. Plan of  care has been reviewed with patient and patient agrees.   3.  Risks, benefits of treatment plan have been discussed.  4.  All questions have been answered.        Vicente Peralta MD  12/22/2023  06:25 EST

## 2023-12-22 NOTE — PLAN OF CARE
Problem: Adult Inpatient Plan of Care  Goal: Absence of Hospital-Acquired Illness or Injury  Intervention: Identify and Manage Fall Risk  Intervention: Prevent and Manage VTE (Venous Thromboembolism) Risk  Intervention: Prevent Infection  Goal: Optimal Comfort and Wellbeing  Intervention: Provide Person-Centered Care     Problem: Infection ( Delivery)  Goal: Absence of Infection Signs and Symptoms  Intervention: Minimize Infection Risk   Goal Outcome Evaluation:               Received to postpartum. Oriented to room and policies. Good bonding with infant. Encouraging po's.urine output good.

## 2023-12-22 NOTE — ANESTHESIA PROCEDURE NOTES
Peripheral Block      Patient location during procedure: OR  Start time: 12/22/2023 8:45 AM  Stop time: 12/22/2023 8:56 AM  Reason for block: at surgeon's request and post-op pain management  Performed by  CRNA/CAA: Nilo Sandoval CRNA  Preanesthetic Checklist  Completed: patient identified, IV checked, risks and benefits discussed, surgical consent, monitors and equipment checked, pre-op evaluation and timeout performed  Prep:  Pt Position: supine  Sterile barriers:cap, gloves, mask and washed/disinfected hands  Prep: ChloraPrep  Patient monitoring: blood pressure monitoring, continuous pulse oximetry and EKG  Procedure    Sedation: no  Performed under: spinal  Guidance:ultrasound guided  Images:still images not obtained    Laterality:Bilateral  Block Type:TAP  Injection Technique:single-shot  Needle Type:echogenic  Needle Gauge:20 G  Resistance on Injection: none  Catheter Size:20 G  Cath Depth at skin: 7 cm    Medications Used: ropivacaine (NAROPIN) 0.2 % injection - Injection   30 mL - 12/22/2023 8:47:00 AM  ropivacaine (NAROPIN) 0.5 % injection - Injection   30 mL - 12/22/2023 8:53:00 AM      Post Assessment  Injection Assessment: negative aspiration for heme, no paresthesia on injection and incremental injection  Patient Tolerance:comfortable throughout block  Complications:no

## 2023-12-22 NOTE — PLAN OF CARE
Problem: Adult Inpatient Plan of Care  Goal: Plan of Care Review  Outcome: Ongoing, Progressing  Goal: Patient-Specific Goal (Individualized)  Outcome: Ongoing, Progressing  Goal: Absence of Hospital-Acquired Illness or Injury  Outcome: Ongoing, Progressing  Intervention: Identify and Manage Fall Risk  Recent Flowsheet Documentation  Taken 2023 by Mariajose Schneider RN  Safety Promotion/Fall Prevention:   safety round/check completed   room organization consistent   nonskid shoes/slippers when out of bed   fall prevention program maintained   assistive device/personal items within reach   clutter free environment maintained  Intervention: Prevent and Manage VTE (Venous Thromboembolism) Risk  Recent Flowsheet Documentation  Taken 2023 06 by Mariajose Schneider RN  VTE Prevention/Management:   bilateral   sequential compression devices on  Goal: Optimal Comfort and Wellbeing  Outcome: Ongoing, Progressing  Intervention: Provide Person-Centered Care  Recent Flowsheet Documentation  Taken 2023 by Mariajose Schneider RN  Trust Relationship/Rapport:   care explained   choices provided   emotional support provided   empathic listening provided   questions answered   thoughts/feelings acknowledged   reassurance provided   questions encouraged  Goal: Readiness for Transition of Care  Outcome: Ongoing, Progressing  Intervention: Mutually Develop Transition Plan  Recent Flowsheet Documentation  Taken 2023 0634 by Mariajose Schneider RN  Transportation Anticipated: family or friend will provide  Patient/Family Anticipated Services at Transition: none  Patient/Family Anticipates Transition to: home  Taken 2023 0632 by Mariajose Schneider, RN  Equipment Currently Used at Home: none     Problem: Bleeding ( Delivery)  Goal: Bleeding is Controlled  Outcome: Ongoing, Progressing     Problem: Change in Fetal Wellbeing ( Delivery)  Goal: Stable Fetal Wellbeing  Outcome: Ongoing,  Progressing     Problem: Infection ( Delivery)  Goal: Absence of Infection Signs and Symptoms  Outcome: Ongoing, Progressing     Problem: Respiratory Compromise ( Delivery)  Goal: Effective Oxygenation and Ventilation  Outcome: Ongoing, Progressing   Goal Outcome Evaluation:

## 2023-12-22 NOTE — L&D DELIVERY NOTE
SECTION REPEAT  Procedure Report    Patient Name:  Shruti Pearl  YOB: 1995    Date of Surgery:  2023     Indications:  Previous  section, labor    Pre-op Diagnosis:   38w4d REPEAT  SECTION     Labor    Post-Op Diagnosis Codes:   Same as above    Procedure/CPT® Codes:      Procedure(s):   SECTION REPEAT        Staff:  Surgeon(s):  Marine Peterson DO Reed, Karen H, MD         Anesthesia: Choice    Estimated Blood Loss:  600cc    Implants:    Nothing was implanted during the procedure    Specimen:          A: Cord gases  B: Cord blood        Findings: normal uterus, bilateral tubes and ovaries    Complications: none    Description of Procedure: The patient was taken to the operating room where a time out was performed to confirm correct patient and correct procedure. IV clindamycin and gentamicin were administered and spinal anesthesia established. The patient was then placed in supine position with left lateral uterine displacement.  The patient was then prepped and draped in the normal sterile fashion for a low transverse skin incision. An incision was made in the skin with a surgical scalpel. Sharp and blunt dissection was used to dissect over subsequent layers of tissue. The fascia was incised at midline and the fascial incision was extended bilaterally using Zabala scissors.  The superior edge of the fascia was grasped and tented up.  The rectus muscles were dissected off with Bovie and blunt dissection.  The inferior edge of the fascia was dissected off in a similar fashion. The rectus muscles were then divided  at midline. The peritoneum was identified and bluntly entered at its superior margin taking care  to avoid at the bladder. The bladder blade was inserted.  A bladder flap was made using Metzenbaum scissors and blunt dissection.  The bladder blade was reinserted.  A transverse incision was made in the  lower uterine segment using the scalpel. The  uterine incision was extended in a cephalocaudad  direction using blunt dissection. The amniotic sac was entered and the amniotic fluid was noted  to be clear. The surgeon's hand was placed into the uterine cavity. The fetal head was identified elevated  into the abdomen and delivered through the uterine incision with the assistance of fundal  pressure. The infant was examined for nuchal cord. Nuchal cord x 1 was identified and reduced. The infant was then delivered with traction and the assistance of fundal pressure. The infant's oral and nasal  passages were bulb suction. On delivery, the cord was clamped x2 and cut. The infant was  then passed off the table to the  team for further care. Cord gases and cord blood were  obtained. The placenta delivered intact with manual expression with a three-vessel cord.  Oxytocin was administered by IV infusion to enhance uterine contraction. The uterus was  exteriorized and cleared of all clots and remaining products of conception. The uterine incision  was reapproximated using 0 Vicryl in a running locked fashion.  A venous sinus was noted on the left aspect of the hysterotomy and continued to have bleeding.  A figure-of-eight was placed for hemostasis using 0 Vicryl.  A 3 cm hematoma was noted in the area.  A second imbricating layer was placed using 0 Vicryl in the normal fashion. The uterus was replaced back  into the abdomen without difficulties.  A nonhemostatic area was repaired with a 0 Vicryl figure-of-eight stitch.  The hematoma on the left side of the hysterotomy appeared stable and hemostatic.  Wendi was applied over the surgical site. The fascia was reapproximated using 0 Vicryl in a running  nonlocked fashion. The subcutaneous adipose tissue was closed with a running horizontal stitch  using 3-0 plain gut suture. The skin was reapproximated using 4-0 Vicryl in a running subcuticular stitch.  At the end of the procedure the uterus was expressed for any  remaining blood clots.  A sterile bandage and Steri-Strips was placed over the incision.  All needle, sponge, and instrument counts were noted to be correct x2 at the end of the  procedure. The patient tolerated the procedure well and was transferred to the recovery room  in stable condition.       Dr. Elliott was responsible for performing the following activities: Retraction, Suction, Irrigation, and Delivery of Fetus and their skilled assistance was necessary for the success of this case.    Marine Peterson,      Date: 12/22/2023  Time: 11:04 EST

## 2023-12-22 NOTE — PROGRESS NOTES
Surgical Assist Note:    I was responsible for performing the following activities: Retraction, Suction, Irrigation, Closing, and Delivery of Fetus and their skilled assistance was necessary for the success of this case.     Margy Elliott MD

## 2023-12-23 LAB
BASOPHILS # BLD AUTO: 0.02 10*3/MM3 (ref 0–0.2)
BASOPHILS NFR BLD AUTO: 0.2 % (ref 0–1.5)
DEPRECATED RDW RBC AUTO: 40.3 FL (ref 37–54)
EOSINOPHIL # BLD AUTO: 0.12 10*3/MM3 (ref 0–0.4)
EOSINOPHIL NFR BLD AUTO: 1 % (ref 0.3–6.2)
ERYTHROCYTE [DISTWIDTH] IN BLOOD BY AUTOMATED COUNT: 14.9 % (ref 12.3–15.4)
HCT VFR BLD AUTO: 26.1 % (ref 34–46.6)
HGB BLD-MCNC: 8.2 G/DL (ref 12–15.9)
IMM GRANULOCYTES # BLD AUTO: 0.09 10*3/MM3 (ref 0–0.05)
IMM GRANULOCYTES NFR BLD AUTO: 0.8 % (ref 0–0.5)
LYMPHOCYTES # BLD AUTO: 1.57 10*3/MM3 (ref 0.7–3.1)
LYMPHOCYTES NFR BLD AUTO: 13.1 % (ref 19.6–45.3)
MCH RBC QN AUTO: 23.9 PG (ref 26.6–33)
MCHC RBC AUTO-ENTMCNC: 31.4 G/DL (ref 31.5–35.7)
MCV RBC AUTO: 76.1 FL (ref 79–97)
MONOCYTES # BLD AUTO: 0.88 10*3/MM3 (ref 0.1–0.9)
MONOCYTES NFR BLD AUTO: 7.4 % (ref 5–12)
NEUTROPHILS NFR BLD AUTO: 77.5 % (ref 42.7–76)
NEUTROPHILS NFR BLD AUTO: 9.26 10*3/MM3 (ref 1.7–7)
NRBC BLD AUTO-RTO: 0 /100 WBC (ref 0–0.2)
PLATELET # BLD AUTO: 171 10*3/MM3 (ref 140–450)
PMV BLD AUTO: 10.3 FL (ref 6–12)
RBC # BLD AUTO: 3.43 10*6/MM3 (ref 3.77–5.28)
WBC NRBC COR # BLD AUTO: 11.94 10*3/MM3 (ref 3.4–10.8)

## 2023-12-23 PROCEDURE — 25010000002 KETOROLAC TROMETHAMINE PER 15 MG: Performed by: STUDENT IN AN ORGANIZED HEALTH CARE EDUCATION/TRAINING PROGRAM

## 2023-12-23 PROCEDURE — 25010000002 IRON SUCROSE PER 1 MG: Performed by: STUDENT IN AN ORGANIZED HEALTH CARE EDUCATION/TRAINING PROGRAM

## 2023-12-23 PROCEDURE — 85025 COMPLETE CBC W/AUTO DIFF WBC: CPT | Performed by: STUDENT IN AN ORGANIZED HEALTH CARE EDUCATION/TRAINING PROGRAM

## 2023-12-23 RX ORDER — ACETAMINOPHEN 325 MG/1
650 TABLET ORAL EVERY 6 HOURS PRN
Qty: 30 TABLET | Refills: 1 | Status: SHIPPED | OUTPATIENT
Start: 2023-12-23 | End: 2024-01-05

## 2023-12-23 RX ORDER — OXYCODONE HYDROCHLORIDE 5 MG/1
5 TABLET ORAL EVERY 4 HOURS PRN
Qty: 10 TABLET | Refills: 0 | Status: SHIPPED | OUTPATIENT
Start: 2023-12-23 | End: 2024-01-05

## 2023-12-23 RX ORDER — IBUPROFEN 800 MG/1
800 TABLET ORAL EVERY 8 HOURS PRN
Qty: 30 TABLET | Refills: 1 | Status: SHIPPED | OUTPATIENT
Start: 2023-12-23

## 2023-12-23 RX ORDER — IBUPROFEN 800 MG/1
800 TABLET ORAL EVERY 8 HOURS SCHEDULED
Status: DISCONTINUED | OUTPATIENT
Start: 2023-12-23 | End: 2023-12-24 | Stop reason: HOSPADM

## 2023-12-23 RX ADMIN — VITAMIN A, VITAMIN C, VITAMIN D, VITAMIN E, THIAMINE, RIBOFLAVIN, NIACIN, VITAMIN B6, FOLIC ACID, VITAMIN B12, CALCIUM, IRON, ZINC, COPPER 1 TABLET: 4000; 120; 400; 22; 1.84; 3; 20; 10; 1; 12; 200; 27; 25; 2 TABLET ORAL at 08:18

## 2023-12-23 RX ADMIN — MAGNESIUM HYDROXIDE 10 ML: 2400 SUSPENSION ORAL at 18:03

## 2023-12-23 RX ADMIN — KETOROLAC TROMETHAMINE 15 MG: 15 INJECTION, SOLUTION INTRAMUSCULAR; INTRAVENOUS at 06:00

## 2023-12-23 RX ADMIN — ACETAMINOPHEN 1000 MG: 500 TABLET ORAL at 08:18

## 2023-12-23 RX ADMIN — OXYCODONE 10 MG: 5 TABLET ORAL at 02:34

## 2023-12-23 RX ADMIN — OXYCODONE 10 MG: 5 TABLET ORAL at 06:45

## 2023-12-23 RX ADMIN — ACETAMINOPHEN 650 MG: 325 TABLET ORAL at 20:33

## 2023-12-23 RX ADMIN — ACETAMINOPHEN 650 MG: 325 TABLET ORAL at 13:45

## 2023-12-23 RX ADMIN — OXYCODONE 10 MG: 5 TABLET ORAL at 17:26

## 2023-12-23 RX ADMIN — KETOROLAC TROMETHAMINE 15 MG: 15 INJECTION, SOLUTION INTRAMUSCULAR; INTRAVENOUS at 00:00

## 2023-12-23 RX ADMIN — ACETAMINOPHEN 1000 MG: 500 TABLET ORAL at 02:34

## 2023-12-23 RX ADMIN — SENNOSIDES AND DOCUSATE SODIUM 1 TABLET: 50; 8.6 TABLET ORAL at 08:18

## 2023-12-23 RX ADMIN — IBUPROFEN 800 MG: 800 TABLET, FILM COATED ORAL at 20:33

## 2023-12-23 RX ADMIN — SENNOSIDES AND DOCUSATE SODIUM 1 TABLET: 50; 8.6 TABLET ORAL at 20:33

## 2023-12-23 RX ADMIN — IRON SUCROSE 200 MG: 20 INJECTION, SOLUTION INTRAVENOUS at 09:31

## 2023-12-23 RX ADMIN — KETOROLAC TROMETHAMINE 15 MG: 15 INJECTION, SOLUTION INTRAMUSCULAR; INTRAVENOUS at 12:05

## 2023-12-23 NOTE — PLAN OF CARE
Problem: Adult Inpatient Plan of Care  Goal: Plan of Care Review  Outcome: Ongoing, Progressing  Goal: Patient-Specific Goal (Individualized)  Outcome: Ongoing, Progressing  Goal: Absence of Hospital-Acquired Illness or Injury  Outcome: Ongoing, Progressing  Intervention: Identify and Manage Fall Risk  Recent Flowsheet Documentation  Taken 12/23/2023 0412 by Mariajose Schneider RN  Safety Promotion/Fall Prevention: safety round/check completed  Taken 12/23/2023 0300 by Mariajose Schneider RN  Safety Promotion/Fall Prevention: safety round/check completed  Taken 12/23/2023 0015 by Mariajose Schneider RN  Safety Promotion/Fall Prevention: safety round/check completed  Taken 12/22/2023 2315 by Mariajose Schneider RN  Safety Promotion/Fall Prevention: safety round/check completed  Taken 12/22/2023 2154 by Mariajose Schneider RN  Safety Promotion/Fall Prevention:   assistive device/personal items within reach   clutter free environment maintained   nonskid shoes/slippers when out of bed   room organization consistent   safety round/check completed  Taken 12/22/2023 2130 by Mariajose Schneider RN  Safety Promotion/Fall Prevention: safety round/check completed  Taken 12/22/2023 1930 by Mariajose Schneider RN  Safety Promotion/Fall Prevention: safety round/check completed  Intervention: Prevent Skin Injury  Recent Flowsheet Documentation  Taken 12/22/2023 2154 by Mariajose Schneider RN  Body Position: position changed independently  Intervention: Prevent and Manage VTE (Venous Thromboembolism) Risk  Recent Flowsheet Documentation  Taken 12/22/2023 2154 by Mariajose Schneider RN  Activity Management: ambulated to bathroom  VTE Prevention/Management: (pt requested hourlong break)   bilateral   sequential compression devices off  Goal: Optimal Comfort and Wellbeing  Outcome: Ongoing, Progressing  Intervention: Monitor Pain and Promote Comfort  Recent Flowsheet Documentation  Taken 12/22/2023 2154 by Mariajose Schneider RN  Pain Management  Interventions:   awakened for pain meds per patient request   around-the-clock dosing utilized   care clustered   quiet environment facilitated  Intervention: Provide Person-Centered Care  Recent Flowsheet Documentation  Taken 2023 by Mariajose Schneider RN  Trust Relationship/Rapport:   care explained   choices provided   emotional support provided   empathic listening provided   questions answered   questions encouraged   reassurance provided   thoughts/feelings acknowledged  Goal: Readiness for Transition of Care  Outcome: Ongoing, Progressing     Problem: Skin Injury Risk Increased  Goal: Skin Health and Integrity  Outcome: Ongoing, Progressing     Problem: Adjustment to Role Transition (Postpartum  Delivery)  Goal: Successful Maternal Role Transition  Outcome: Ongoing, Progressing     Problem: Bleeding (Postpartum  Delivery)  Goal: Hemostasis  Outcome: Ongoing, Progressing     Problem: Infection (Postpartum  Delivery)  Goal: Absence of Infection Signs and Symptoms  Outcome: Ongoing, Progressing     Problem: Pain (Postpartum  Delivery)  Goal: Acceptable Pain Control  Outcome: Ongoing, Progressing  Intervention: Prevent or Manage Pain  Recent Flowsheet Documentation  Taken 2023 by Mariajose Schneider, RN  Pain Management Interventions:   awakened for pain meds per patient request   around-the-clock dosing utilized   care clustered   quiet environment facilitated     Problem: Postoperative Nausea and Vomiting (Postpartum  Delivery)  Goal: Nausea and Vomiting Relief  Outcome: Ongoing, Progressing     Problem: Postoperative Urinary Retention (Postpartum  Delivery)  Goal: Effective Urinary Elimination  Outcome: Ongoing, Progressing   Goal Outcome Evaluation:

## 2023-12-23 NOTE — DISCHARGE INSTRUCTIONS
Pelvic rest for 6 weeks, no intercourse for 6 weeks, no tampons or douching for 6 weeks, nothing in the vagina for 6 weeks  No driving for 2 weeks  No lifting more than 15 to 20 pounds for 2 weeks  No tub baths for 2 weeks, but may shower  Keep the incision clean and dry    Call the office or go to the emergency department for temperature greater than 100 °F, shortness of breath or chest pain, excessive nausea or vomiting, pain that is worsening despite current pain medications, redness, swelling, or drainage from the incision site, vaginal bleeding soaking a pad in less than 1 hour.

## 2023-12-23 NOTE — ANESTHESIA POSTPROCEDURE EVALUATION
Patient: Shruti Pearl    Procedure Summary       Date: 23 Room / Location: [facility] LABOR DELIVERY  [facility] LABOR DELIVERY    Anesthesia Start: 739 Anesthesia Stop: 914    Procedure:  SECTION REPEAT (Abdomen) Diagnosis: (39/ REPEAT, )    Surgeons: Marine Peterson DO Provider: Nilo Sandoval CRNA    Anesthesia Type: spinal ASA Status: 2            Anesthesia Type: spinal    Vitals  Vitals Value Taken Time   BP 94/60 23 0954   Temp 36.3 °C (97.34 °F) 23 0954   Pulse 80 23 0954   Resp 16 23 0608   SpO2 100 % 23 1238   Vitals shown include unfiled device data.        Post Anesthesia Care and Evaluation    Patient location during evaluation: bedside  Patient participation: complete - patient participated  Level of consciousness: awake  Pain score: 0  Pain management: adequate    Airway patency: patent  Anesthetic complications: No anesthetic complications  PONV Status: controlled  Cardiovascular status: acceptable and stable  Respiratory status: acceptable  Hydration status: acceptable  Post Neuraxial Block status: Motor and sensory function returned to baseline and No signs or symptoms of PDPH

## 2023-12-23 NOTE — PROGRESS NOTES
Postpartum  Section       POD# 1    Shruti Pearl is a 28 y.o.  who underwent a  sectionRLTCS  She complains of appropriate pain per palpation that is controlled by pain meds. Patient is urinating appropriately.  Patient is eating without any N/V. Patient states lochia is light. Patient is ambulating without any difficulty. Patient is breastfeed.       I/O last 3 completed shifts:  In: 2540 [P.O.:240; I.V.:2300]  Out: 2525 [Urine:1925; Blood:600]  No intake/output data recorded.    Physical Exam:  HEENT:Normocephalic and atraumatic, NAD  Lungs: No labored breathing  Abdomen: Soft, appropriate tenderness to palpation, Uterine fundus firm and below the umbilicus  Wound:  Clean, Dry and Intact, negative drainage   Extremities: Negative swelling or cyanosis, negative clonus    Recent Results (from the past 48 hour(s))   POC Urinalysis Dipstick    Collection Time: 23  3:37 AM    Specimen: Urine   Result Value Ref Range    Color Candi Yellow, Straw, Dark Yellow, Candi    Clarity, UA Cloudy (A) Clear    Glucose, UA Negative Negative mg/dL    Bilirubin Negative Negative    Ketones, UA Negative Negative    Specific Gravity  1.030 1.005 - 1.030    Blood, UA Large (A) Negative    pH, Urine 6.0 5.0 - 8.0    Protein, POC 30 mg/dL (A) Negative mg/dL    Urobilinogen, UA 0.2 E.U./dL Normal, 0.2 E.U./dL    Leukocytes Negative Negative    Nitrite, UA Negative Negative   Type & Screen    Collection Time: 23  6:26 AM    Specimen: Blood   Result Value Ref Range    ABO Type B     RH type Positive     Antibody Screen Negative     T&S Expiration Date 2023 11:59:59 PM    CBC (No Diff)    Collection Time: 23  6:26 AM    Specimen: Blood   Result Value Ref Range    WBC 9.49 3.40 - 10.80 10*3/mm3    RBC 4.61 3.77 - 5.28 10*6/mm3    Hemoglobin 10.9 (L) 12.0 - 15.9 g/dL    Hematocrit 34.9 34.0 - 46.6 %    MCV 75.7 (L) 79.0 - 97.0 fL    MCH 23.6 (L) 26.6 - 33.0 pg    MCHC 31.2 (L) 31.5 - 35.7 g/dL     RDW 14.8 12.3 - 15.4 %    RDW-SD 39.6 37.0 - 54.0 fl    MPV 11.0 6.0 - 12.0 fL    Platelets 220 140 - 450 10*3/mm3   Comprehensive Metabolic Panel    Collection Time: 12/22/23  6:26 AM    Specimen: Blood   Result Value Ref Range    Glucose 76 65 - 99 mg/dL    BUN 7 6 - 20 mg/dL    Creatinine 0.60 0.57 - 1.00 mg/dL    Sodium 134 (L) 136 - 145 mmol/L    Potassium 3.5 3.5 - 5.2 mmol/L    Chloride 102 98 - 107 mmol/L    CO2 17.3 (L) 22.0 - 29.0 mmol/L    Calcium 9.2 8.6 - 10.5 mg/dL    Total Protein 7.0 6.0 - 8.5 g/dL    Albumin 3.3 (L) 3.5 - 5.2 g/dL    ALT (SGPT) 12 1 - 33 U/L    AST (SGOT) 13 1 - 32 U/L    Alkaline Phosphatase 118 (H) 39 - 117 U/L    Total Bilirubin 0.7 0.0 - 1.2 mg/dL    Globulin 3.7 gm/dL    A/G Ratio 0.9 g/dL    BUN/Creatinine Ratio 11.7 7.0 - 25.0    Anion Gap 14.7 5.0 - 15.0 mmol/L    eGFR 126.3 >60.0 mL/min/1.73   Urine Drug Screen - Urine, Clean Catch    Collection Time: 12/22/23  6:26 AM    Specimen: Urine, Clean Catch   Result Value Ref Range    Amphet/Methamphet, Screen Negative Negative    Barbiturates Screen, Urine Negative Negative    Benzodiazepine Screen, Urine Negative Negative    Cocaine Screen, Urine Negative Negative    Opiate Screen Negative Negative    THC, Screen, Urine Negative Negative    Methadone Screen, Urine Negative Negative    Oxycodone Screen, Urine Negative Negative    Fentanyl, Urine Negative Negative   Blood Gas, Venous, Cord    Collection Time: 12/22/23  8:03 AM    Specimen: Umbilical Cord; Cord Blood Venous   Result Value Ref Range    pH, Cord Venous      pCO2, Cord Venous      pO2, Cord Venous      Base Excess, Cord Venous      HCO3, Cord Venous     CBC Auto Differential    Collection Time: 12/23/23  5:53 AM    Specimen: Blood   Result Value Ref Range    WBC 11.94 (H) 3.40 - 10.80 10*3/mm3    RBC 3.43 (L) 3.77 - 5.28 10*6/mm3    Hemoglobin 8.2 (L) 12.0 - 15.9 g/dL    Hematocrit 26.1 (L) 34.0 - 46.6 %    MCV 76.1 (L) 79.0 - 97.0 fL    MCH 23.9 (L) 26.6 - 33.0 pg     MCHC 31.4 (L) 31.5 - 35.7 g/dL    RDW 14.9 12.3 - 15.4 %    RDW-SD 40.3 37.0 - 54.0 fl    MPV 10.3 6.0 - 12.0 fL    Platelets 171 140 - 450 10*3/mm3    Neutrophil % 77.5 (H) 42.7 - 76.0 %    Lymphocyte % 13.1 (L) 19.6 - 45.3 %    Monocyte % 7.4 5.0 - 12.0 %    Eosinophil % 1.0 0.3 - 6.2 %    Basophil % 0.2 0.0 - 1.5 %    Immature Grans % 0.8 (H) 0.0 - 0.5 %    Neutrophils, Absolute 9.26 (H) 1.70 - 7.00 10*3/mm3    Lymphocytes, Absolute 1.57 0.70 - 3.10 10*3/mm3    Monocytes, Absolute 0.88 0.10 - 0.90 10*3/mm3    Eosinophils, Absolute 0.12 0.00 - 0.40 10*3/mm3    Basophils, Absolute 0.02 0.00 - 0.20 10*3/mm3    Immature Grans, Absolute 0.09 (H) 0.00 - 0.05 10*3/mm3    nRBC 0.0 0.0 - 0.2 /100 WBC   ]      Vitals:    12/23/23 0608   BP: 97/62   Pulse: 68   Resp: 16   Temp: 97.2 °F (36.2 °C)   SpO2:            ASSESSMENT/PLAN:    Patient is a 28 y.o.female who is POD# 1 s/p RLTCS  - Progressing as expected  - Vital signs per protocol  - Rh+ /Rubella non-immune, recommended MMF  -General Diet  -breastfeed  - Continue pain regimen for pain control  - Discontinue Calderón  - AM labs reviewed, Hgb 8.2- IV iron ordered x2 doses  - Encourage ambulation  - SCDs while in bed    Marine Peterson DO

## 2023-12-23 NOTE — DISCHARGE SUMMARY
[facility]  Delivery Discharge Summary    Patient Name: Shruti Pearl  : 1995  MRN: [id number]    Date of Admission: 2023  Date of Discharge:  2023   Primary Care Physician: Provider, No Known  OB Clinician: Marine Peterson DO    Procedures:  2023  - , Low Transverse      Admitting Diagnosis:  Previous  section [Z98.891]    Discharge Diagnosis:  Same as Admitting plus:   Pregnancy at 38w4d - Delivered     Antepartum complications: none    Delivery:   Date of Delivery: 2023   Time of Delivery: 8:06 AM   Delivered By:  Marine Peterson   Delivery Type: , Low Transverse    Anesthesia: Spinal    Intrapartum complications: None  Placenta: Expressed       Baby:  male  infant;   Apgar:  7  @ 1 minute /   Apgar:  8  @ 5 minutes   Weight: 3650 g (8 lb 0.8 oz)    Length: 20     Feeding method: Breastfeeding Status: Yes    Discharge Details:     Discharge Medications        New Medications        Instructions Start Date   acetaminophen 325 MG tablet  Commonly known as: Tylenol   650 mg, Oral, Every 6 Hours PRN      ibuprofen 800 MG tablet  Commonly known as: ADVIL,MOTRIN   800 mg, Oral, Every 8 Hours PRN      oxyCODONE 5 MG immediate release tablet  Commonly known as: ROXICODONE   5 mg, Oral, Every 4 Hours PRN             Continue These Medications        Instructions Start Date   ferrous sulfate 325 (65 FE) MG tablet   325 mg, Oral, Every Other Day      pantoprazole 20 MG EC tablet  Commonly known as: PROTONIX   1 tablet, Oral, Daily      prenatal (CLASSIC) vitamin 28-0.8 MG tablet tablet  Generic drug: prenatal vitamin   Oral, Daily      sodium chloride 0.65 % nasal spray  Commonly known as: Ocean Nasal Spray   1 spray, Nasal, As Needed               Allergies   Allergen Reactions    Erythromycin Unknown - Low Severity    Nitrofurantoin Macrocrystal Hives    Penicillins Hives       Discharge Disposition:   Home, self-care    Discharge Condition:  Good    Follow Up:  Future  Appointments   Date Time Provider Department Center   1/5/2024  8:30 AM Leslie Kessler [place] [facility] [place] [facility]         Plan:  Follow up in 2 weeks for incision check    Electronically signed by Marine Peterson DO, 12/23/23, 8:46 AM EST.

## 2023-12-24 VITALS
HEART RATE: 81 BPM | TEMPERATURE: 98.1 F | BODY MASS INDEX: 39.44 KG/M2 | HEIGHT: 64 IN | RESPIRATION RATE: 18 BRPM | OXYGEN SATURATION: 100 % | SYSTOLIC BLOOD PRESSURE: 110 MMHG | DIASTOLIC BLOOD PRESSURE: 70 MMHG | WEIGHT: 231 LBS

## 2023-12-24 PROCEDURE — 25010000002 MEASLES, MUMPS & RUBELLA VAC RECONSTITUTED SOLUTION: Performed by: STUDENT IN AN ORGANIZED HEALTH CARE EDUCATION/TRAINING PROGRAM

## 2023-12-24 PROCEDURE — 90707 MMR VACCINE SC: CPT | Performed by: STUDENT IN AN ORGANIZED HEALTH CARE EDUCATION/TRAINING PROGRAM

## 2023-12-24 PROCEDURE — 90471 IMMUNIZATION ADMIN: CPT | Performed by: STUDENT IN AN ORGANIZED HEALTH CARE EDUCATION/TRAINING PROGRAM

## 2023-12-24 PROCEDURE — 25010000002 IRON SUCROSE PER 1 MG: Performed by: STUDENT IN AN ORGANIZED HEALTH CARE EDUCATION/TRAINING PROGRAM

## 2023-12-24 RX ADMIN — IBUPROFEN 800 MG: 800 TABLET, FILM COATED ORAL at 06:31

## 2023-12-24 RX ADMIN — OXYCODONE 10 MG: 5 TABLET ORAL at 02:00

## 2023-12-24 RX ADMIN — VITAMIN A, VITAMIN C, VITAMIN D, VITAMIN E, THIAMINE, RIBOFLAVIN, NIACIN, VITAMIN B6, FOLIC ACID, VITAMIN B12, CALCIUM, IRON, ZINC, COPPER 1 TABLET: 4000; 120; 400; 22; 1.84; 3; 20; 10; 1; 12; 200; 27; 25; 2 TABLET ORAL at 08:35

## 2023-12-24 RX ADMIN — MEASLES, MUMPS, AND RUBELLA VIRUS VACCINE LIVE 0.5 ML: 1000; 12500; 1000 INJECTION, POWDER, LYOPHILIZED, FOR SUSPENSION SUBCUTANEOUS at 12:37

## 2023-12-24 RX ADMIN — ACETAMINOPHEN 650 MG: 325 TABLET ORAL at 01:59

## 2023-12-24 RX ADMIN — ACETAMINOPHEN 650 MG: 325 TABLET ORAL at 08:36

## 2023-12-24 RX ADMIN — IRON SUCROSE 200 MG: 20 INJECTION, SOLUTION INTRAVENOUS at 10:02

## 2023-12-24 RX ADMIN — SENNOSIDES AND DOCUSATE SODIUM 1 TABLET: 50; 8.6 TABLET ORAL at 08:35

## 2023-12-24 NOTE — PROGRESS NOTES
Postpartum  Section       POD# 2    Shruti Pearl is a 28 y.o.  who underwent a  sectionRLTCS  She complains of appropriate pain per palpation that is controlled by pain meds. Patient is urinating appropriately.  Patient is eating without any N/V. Patient states lochia is light. Patient is ambulating without any difficulty. Patient is breastfeed.       I/O last 3 completed shifts:  In: -   Out: 775 [Urine:775]  No intake/output data recorded.    Physical Exam:  HEENT:Normocephalic and atraumatic, NAD  Lungs: No labored breathing  Abdomen: Soft, appropriate tenderness to palpation, Uterine fundus firm and below the umbilicus  Wound:  Clean, Dry and Intact, negative drainage   Extremities: Negative swelling or cyanosis, negative clonus    Recent Results (from the past 48 hour(s))   CBC Auto Differential    Collection Time: /  5:53 AM    Specimen: Blood   Result Value Ref Range    WBC 11.94 (H) 3.40 - 10.80 10*3/mm3    RBC 3.43 (L) 3.77 - 5.28 10*6/mm3    Hemoglobin 8.2 (L) 12.0 - 15.9 g/dL    Hematocrit 26.1 (L) 34.0 - 46.6 %    MCV 76.1 (L) 79.0 - 97.0 fL    MCH 23.9 (L) 26.6 - 33.0 pg    MCHC 31.4 (L) 31.5 - 35.7 g/dL    RDW 14.9 12.3 - 15.4 %    RDW-SD 40.3 37.0 - 54.0 fl    MPV 10.3 6.0 - 12.0 fL    Platelets 171 140 - 450 10*3/mm3    Neutrophil % 77.5 (H) 42.7 - 76.0 %    Lymphocyte % 13.1 (L) 19.6 - 45.3 %    Monocyte % 7.4 5.0 - 12.0 %    Eosinophil % 1.0 0.3 - 6.2 %    Basophil % 0.2 0.0 - 1.5 %    Immature Grans % 0.8 (H) 0.0 - 0.5 %    Neutrophils, Absolute 9.26 (H) 1.70 - 7.00 10*3/mm3    Lymphocytes, Absolute 1.57 0.70 - 3.10 10*3/mm3    Monocytes, Absolute 0.88 0.10 - 0.90 10*3/mm3    Eosinophils, Absolute 0.12 0.00 - 0.40 10*3/mm3    Basophils, Absolute 0.02 0.00 - 0.20 10*3/mm3    Immature Grans, Absolute 0.09 (H) 0.00 - 0.05 10*3/mm3    nRBC 0.0 0.0 - 0.2 /100 WBC   ]      Vitals:    23 0638   BP: 108/73   Pulse: 76   Resp: 16   Temp: 97.3 °F (36.3 °C)   SpO2:             ASSESSMENT/PLAN:    Patient is a 28 y.o.female who is POD# 2 s/p RLTCS  - Progressing as expected  - Vital signs per protocol  - Rh+ /Rubella non-immune, recommended MMF  - breastfeed  - Continue pain regimen for pain control  - Encourage ambulation  - Stable for DC home    Marine Peterson DO

## 2023-12-29 ENCOUNTER — PATIENT OUTREACH (OUTPATIENT)
Dept: LABOR AND DELIVERY | Facility: HOSPITAL | Age: 28
End: 2023-12-29
Payer: COMMERCIAL

## 2023-12-29 NOTE — OUTREACH NOTE
[facility]  Postpartum Check-In    Questions/Answers      Flowsheet Row Responses   Visit Setting Telephone   Best Method for Contacting Cell   OB Discharge Note Reviewed  Reviewed   OB Discharge Navigator Reviewed  Reviewed   OB Discharge Medications Reviewed  Reviewed    discharged home with mother? Yes   Current Pain Levels 0-10 0   At Rest Pain Levels 0-10 0   Pain level with activity 0-10 0   Acceptable Pain Level 0-10 0   Verbalized Emotional State Acceptance   Family/Support Network Family, Significant Other   Level of Involvement in Care Attentive, Interactive, Supportive   Do you feel comfortable in your relationship with your baby? Yes   Have members of your household adjusted to your baby? Yes   Is the baby's father supportive and/or involved with the baby? Yes   How does your partner feel about the baby? Happy, Involved   Do you feel safe at home, school and work? Yes   Are you in a relationship with someone who threatens you or hurts you? No   Do you have the resources to keep yourself and your baby healthy and safe? Yes   Lochia (per patient report) Similar to Menstrual Flow   Amount Spotting   Number of pads per day 3   Lochia Odor None   Is patient breastfeeding? Yes, pumping   Frequency after every feed   Pumping Quantity 2-4oz   Postpartum Depression Screening Education Education Provided   Doctor Appointments: Education Provided   Breastfeeding Education Education Provided   Family Planning Education Education Provided   Postpartum Care Education Education Provided   S & S to report Education Provided   Followup Appointments Made Yes   Well Child Visit Appointments Made Yes   Did you complete the visit? Yes   Were there any specific concerns? No   Umbilical Cord No reported signs or symptoms   Was the baby circumcised? Yes   Circumcision care and signs/symptoms to report Reviewed   Infant Feeding Method Breast, Expressed Breast Milk   Frequency of feedings q2-3 hours   Expressed  milk PO (mL) 2oz   Expressed milk- frequency of feedings PRN   Number of wet diapers x 24 hours 8   Last BM x 24 hours 8   What safe sleep surface is available? Bassinet   Are there stuffed animals, toys, pillows, quilts, blankets, wedges, positioners, bumpers or other loose bedding in the infant's sleeping environment? No   Where does the baby usually sleep? Bassinet   Does the baby ever share a sleep surface with a sibling, adult or pet? No   Does the baby ever share a sleep surface in a bed, couch, recliner or other? No   What position do you place your baby to sleep for naps? Back   What position do you place your baby to sleep at night Back   Are you and/or other caregivers smoking inside or outside the baby's home? No   Is the infant dressed appropiately for the temperature of the home? Yes   Do you use a clean, dry pacifier that is not attached to a string or stuffed animal? Yes            Review of Systems    Most Recent Lizella  Depression Scale Score (EPDS)    Performed by a clinician: 0 (2023 12:30 PM)    Received via [facility] questionnaire: 0 (2023)     5 Ps Screen  completed      Francia Holloway RN  Maternity Nurse Navigator    2023, 15:01 EST

## 2023-12-30 ENCOUNTER — TELEPHONE (OUTPATIENT)
Dept: LACTATION | Facility: HOSPITAL | Age: 28
End: 2023-12-30
Payer: COMMERCIAL

## 2023-12-30 NOTE — TELEPHONE ENCOUNTER
KLAUDIA called to check with this patient and her breastfeeding progress. Patient did not answer the phone so  provided the lactation dept number and encouraged to call for any lactation needs.

## 2024-01-05 ENCOUNTER — PATIENT OUTREACH (OUTPATIENT)
Dept: CALL CENTER | Facility: HOSPITAL | Age: 29
End: 2024-01-05
Payer: COMMERCIAL

## 2024-01-05 ENCOUNTER — POSTPARTUM VISIT (OUTPATIENT)
Dept: OBSTETRICS AND GYNECOLOGY | Facility: CLINIC | Age: 29
End: 2024-01-05
Payer: COMMERCIAL

## 2024-01-05 VITALS
WEIGHT: 207.4 LBS | HEIGHT: 64 IN | BODY MASS INDEX: 35.41 KG/M2 | SYSTOLIC BLOOD PRESSURE: 116 MMHG | HEART RATE: 80 BPM | DIASTOLIC BLOOD PRESSURE: 84 MMHG

## 2024-01-05 DIAGNOSIS — Z51.89 VISIT FOR WOUND CHECK: Primary | ICD-10-CM

## 2024-01-05 DIAGNOSIS — Z98.891 S/P CESAREAN SECTION: ICD-10-CM

## 2024-01-05 NOTE — PROGRESS NOTES
"Shruti Pearl here for incision check.  Had RCS one week ago.She denies any complaints.     Vitals:    24 0824   BP: 116/84   Pulse: 80   Weight: 94.1 kg (207 lb 6.4 oz)   Height: 162.6 cm (64\")       Body mass index is 35.6 kg/m².     ABDOMEN: soft, NT; incision healing well without erythema/induration/fluctuance/drainage/bleeding.    Impression  One week s/p RCS  Diagnoses and all orders for this visit:    1. Visit for wound check (Primary)    2. S/P  section          Plan  Follow up in 4 weeks for postpartum visit     Leslie Kessler,                     "

## 2024-02-06 ENCOUNTER — POSTPARTUM VISIT (OUTPATIENT)
Dept: OBSTETRICS AND GYNECOLOGY | Facility: CLINIC | Age: 29
End: 2024-02-06
Payer: COMMERCIAL

## 2024-02-06 VITALS
DIASTOLIC BLOOD PRESSURE: 76 MMHG | HEIGHT: 64 IN | WEIGHT: 205.2 LBS | BODY MASS INDEX: 35.03 KG/M2 | SYSTOLIC BLOOD PRESSURE: 104 MMHG

## 2024-02-06 NOTE — PROGRESS NOTES
"  POSTPARTUM Follow Up Visit      Chief Complaint   Patient presents with    Postpartum Care     4 weeks PP     HPI:      Date of delivery: 23  Delivery type:   LTCS          Perineum : Intact  Delivering Provider:   Dr. Marine Peterson      Feeding: Breast  Pain:  No  Vaginal Bleeding:  No  Depressed/Anxious:  No  EPDS score: 1   #10: 0  Plans for BC:  Condoms  Weight at last OB OV:  212lb  Last pap date and result: Normal with HP+, repeat in        Postpartum inventory   Infant feeding: Breast  Postpartum pain: None  Vaginal bleeding : Mostly light - only using liner  Depression/Anxiety: None  Contracepton: Condoms    EPD Scale: Thought of Harming Self: 0-->never  [place]  Depression Score: 1        PHYSICAL EXAM:  /76   Ht 162.6 cm (64\")   Wt 93.1 kg (205 lb 3.2 oz)   Breastfeeding Yes   BMI 35.22 kg/m²  Not found.  General- NAD, alert and oriented, appropriate  Psych- Normal mood, good memory, good eye contact  INCISION : Clean, dry, intact, no evidence of infection  Abdomen- Soft, non distended, non tender, no masses    External genitalia- Normal.    Urethra/Bladder/Vagina- Normal, no masses, non-tender, no prolapse   Cvx- Normal, no lesions, no discharge, no CMT  Uterus- Normal size, shape & consistency.  Non tender, mobile.  Adnexa- Normal, no mass, non-tender    Lymphatic- No palpable groin nodes  Ext- No edema    ASSESSMENT AND PLAN:  Diagnoses and all orders for this visit:    1. Postpartum follow-up (Primary)  -     Cancel: IGP,rfx Aptima HPV All Pth      Counseling:    All birth control options reviewed in detail.  R/B/A/SE/E of each wrt pts PMHx and prior BC use  KRISTY risk w hormonal BIRTH CONTROL reviewed (estrogen containing only), S/Sx to watch for discussed and questions answered.  Newer studies indicate possible increased breast cancer reviewed (both estrogen and progestin only).  May resume intercourse  May resume normal activities  Core strengthening exercises " reviewed and recommended  Kegel exercises reviewed and recommended  Ok to return to work/school      Follow Up:  Return in about 1 year (around 2/6/2025) for Annual exam.    I spent 20 minutes on the separately reported service of Postpartum. This time is not included in the time used to support the E/M service also reported today.    Leslie Kessler DO  02/06/2024    [facility] [place] [facility]  [facility] [place] [address]  Dept: [phone]  Dept Fax: [phone]  Loc: [phone]  Loc Fax: [phone]

## 2024-09-13 ENCOUNTER — TELEPHONE (OUTPATIENT)
Dept: OBSTETRICS AND GYNECOLOGY | Facility: CLINIC | Age: 29
End: 2024-09-13
Payer: COMMERCIAL

## 2024-09-13 NOTE — TELEPHONE ENCOUNTER
Established patient with positive home pregnancy test. LMP 7/20/2024. Scheduled next available New OB appointment for 12/3/2024. Any further recommendations? Please advise.

## 2024-09-16 DIAGNOSIS — Z32.01 POSITIVE PREGNANCY TEST: Primary | ICD-10-CM

## 2024-09-24 ENCOUNTER — LAB (OUTPATIENT)
Dept: OBSTETRICS AND GYNECOLOGY | Facility: CLINIC | Age: 29
End: 2024-09-24

## 2024-09-24 LAB — HCG INTACT+B SERPL-ACNC: NORMAL MIU/ML

## 2024-09-24 PROCEDURE — 36415 COLL VENOUS BLD VENIPUNCTURE: CPT | Performed by: OBSTETRICS & GYNECOLOGY

## 2024-09-24 PROCEDURE — 84702 CHORIONIC GONADOTROPIN TEST: CPT | Performed by: OBSTETRICS & GYNECOLOGY

## 2024-09-25 DIAGNOSIS — Z32.01 POSITIVE PREGNANCY TEST: Primary | ICD-10-CM

## 2024-10-01 ENCOUNTER — OFFICE VISIT (OUTPATIENT)
Dept: OBSTETRICS AND GYNECOLOGY | Facility: CLINIC | Age: 29
End: 2024-10-01
Payer: MEDICAID

## 2024-10-01 VITALS — BODY MASS INDEX: 37.39 KG/M2 | HEIGHT: 64 IN | WEIGHT: 219 LBS

## 2024-10-01 DIAGNOSIS — Z34.91 ENCOUNTER FOR SUPERVISION OF NORMAL PREGNANCY IN FIRST TRIMESTER, UNSPECIFIED GRAVIDITY: ICD-10-CM

## 2024-10-01 DIAGNOSIS — Z87.59 HISTORY OF PLACENTAL ABRUPTION: ICD-10-CM

## 2024-10-01 DIAGNOSIS — Z98.891 PREVIOUS CESAREAN SECTION: ICD-10-CM

## 2024-10-01 DIAGNOSIS — O09.899 RUBELLA NON-IMMUNE STATUS, ANTEPARTUM: ICD-10-CM

## 2024-10-01 DIAGNOSIS — Z32.01 POSITIVE PREGNANCY TEST: Primary | ICD-10-CM

## 2024-10-01 DIAGNOSIS — Z28.39 RUBELLA NON-IMMUNE STATUS, ANTEPARTUM: ICD-10-CM

## 2024-10-01 DIAGNOSIS — F31.81 BIPOLAR II DISORDER: ICD-10-CM

## 2024-10-01 DIAGNOSIS — O09.899 HX OF PRETERM DELIVERY, CURRENTLY PREGNANT: ICD-10-CM

## 2024-10-01 DIAGNOSIS — Z34.80 SUPERVISION OF OTHER NORMAL PREGNANCY, ANTEPARTUM: ICD-10-CM

## 2024-10-01 PROBLEM — R87.810 CERVICAL HIGH RISK HPV (HUMAN PAPILLOMAVIRUS) TEST POSITIVE: Status: RESOLVED | Noted: 2023-01-23 | Resolved: 2024-10-01

## 2024-10-01 LAB
ABO GROUP BLD: NORMAL
AMPHET+METHAMPHET UR QL: NEGATIVE
AMPHETAMINES UR QL: NEGATIVE
BARBITURATES UR QL SCN: NEGATIVE
BASOPHILS # BLD AUTO: 0.02 10*3/MM3 (ref 0–0.2)
BASOPHILS NFR BLD AUTO: 0.3 % (ref 0–1.5)
BENZODIAZ UR QL SCN: NEGATIVE
BLD GP AB SCN SERPL QL: NEGATIVE
BUPRENORPHINE SERPL-MCNC: NEGATIVE NG/ML
C TRACH RRNA CVX QL NAA+PROBE: NOT DETECTED
CANNABINOIDS SERPL QL: NEGATIVE
COCAINE UR QL: NEGATIVE
DEPRECATED RDW RBC AUTO: 42.5 FL (ref 37–54)
EOSINOPHIL # BLD AUTO: 0.11 10*3/MM3 (ref 0–0.4)
EOSINOPHIL NFR BLD AUTO: 1.5 % (ref 0.3–6.2)
ERYTHROCYTE [DISTWIDTH] IN BLOOD BY AUTOMATED COUNT: 14 % (ref 12.3–15.4)
FENTANYL UR-MCNC: NEGATIVE NG/ML
GLUCOSE UR STRIP-MCNC: NEGATIVE MG/DL
HBA1C MFR BLD: 4.8 % (ref 4.8–5.6)
HBV SURFACE AG SERPL QL IA: NORMAL
HCT VFR BLD AUTO: 41.4 % (ref 34–46.6)
HCV AB SER QL: NORMAL
HGB BLD-MCNC: 13.4 G/DL (ref 12–15.9)
HIV 1+2 AB+HIV1 P24 AG SERPL QL IA: NORMAL
IMM GRANULOCYTES # BLD AUTO: 0.03 10*3/MM3 (ref 0–0.05)
IMM GRANULOCYTES NFR BLD AUTO: 0.4 % (ref 0–0.5)
LYMPHOCYTES # BLD AUTO: 1.3 10*3/MM3 (ref 0.7–3.1)
LYMPHOCYTES NFR BLD AUTO: 17.6 % (ref 19.6–45.3)
MCH RBC QN AUTO: 27.1 PG (ref 26.6–33)
MCHC RBC AUTO-ENTMCNC: 32.4 G/DL (ref 31.5–35.7)
MCV RBC AUTO: 83.6 FL (ref 79–97)
METHADONE UR QL SCN: NEGATIVE
MONOCYTES # BLD AUTO: 0.37 10*3/MM3 (ref 0.1–0.9)
MONOCYTES NFR BLD AUTO: 5 % (ref 5–12)
N GONORRHOEA RRNA SPEC QL NAA+PROBE: NOT DETECTED
NEUTROPHILS NFR BLD AUTO: 5.57 10*3/MM3 (ref 1.7–7)
NEUTROPHILS NFR BLD AUTO: 75.2 % (ref 42.7–76)
NRBC BLD AUTO-RTO: 0 /100 WBC (ref 0–0.2)
OPIATES UR QL: NEGATIVE
OXYCODONE UR QL SCN: NEGATIVE
PCP UR QL SCN: NEGATIVE
PLATELET # BLD AUTO: 229 10*3/MM3 (ref 140–450)
PMV BLD AUTO: 10.5 FL (ref 6–12)
PROT UR STRIP-MCNC: NEGATIVE MG/DL
RBC # BLD AUTO: 4.95 10*6/MM3 (ref 3.77–5.28)
RH BLD: POSITIVE
RPR SER QL: NORMAL
TRICYCLICS UR QL SCN: NEGATIVE
WBC NRBC COR # BLD AUTO: 7.4 10*3/MM3 (ref 3.4–10.8)

## 2024-10-01 PROCEDURE — 87591 N.GONORRHOEAE DNA AMP PROB: CPT | Performed by: OBSTETRICS & GYNECOLOGY

## 2024-10-01 PROCEDURE — 87491 CHLMYD TRACH DNA AMP PROBE: CPT | Performed by: OBSTETRICS & GYNECOLOGY

## 2024-10-01 PROCEDURE — 87086 URINE CULTURE/COLONY COUNT: CPT | Performed by: OBSTETRICS & GYNECOLOGY

## 2024-10-01 PROCEDURE — 86803 HEPATITIS C AB TEST: CPT | Performed by: OBSTETRICS & GYNECOLOGY

## 2024-10-01 PROCEDURE — 80307 DRUG TEST PRSMV CHEM ANLYZR: CPT | Performed by: OBSTETRICS & GYNECOLOGY

## 2024-10-01 PROCEDURE — 80081 OBSTETRIC PANEL INC HIV TSTG: CPT | Performed by: OBSTETRICS & GYNECOLOGY

## 2024-10-01 PROCEDURE — 83036 HEMOGLOBIN GLYCOSYLATED A1C: CPT | Performed by: OBSTETRICS & GYNECOLOGY

## 2024-10-01 NOTE — PROGRESS NOTES
OBSTETRIC HISTORY AND PHYSICAL     Subjective:  Shruti Pearl is a 29 y.o.  at 10w3d  here for her new OB visit. Patient pregnancy is dated by LMP, confirmed with early US. Patient's pregnancy is complicated by hx of CS X2, short interval pregnancy,  labor, placental abruption, bipolar.   She is taking her prenatal vitamins.Reports no loss of fluid or vaginal bleeding.No hx of genetic, bleeding, endocrine, chromosome disorder in both patient and partner. No history of multiple gestations, congenital anomalies or mental retardation.    FOB involvement: yes, same FOB   Pediatrician: yes  Breast/Bottle: yes  Epidural: spinal   Discussed adequate water intake, food guidelines/weight gain, limit caffiene to less than 200mg daily.   Discussed food, activities to avoid. Discussed seatbelt safety.   Reviewed safe meds in pregnancy handout.  Taking PNV: yes  Smoking cessation needed: no     Reviewed and updated:  OBHx, GYNHx (STDs), PMHx, Medications, Allergies, PSHx, Social Hx, Preventative Hx (PAP), Hx of abuse/safe environment, Vaccine Hx including hx of chickenpox or vaccine, Genetic Hx (pt, FOB, both families).        OB History    Para Term  AB Living   4 2 1 1 1 2   SAB IAB Ectopic Molar Multiple Live Births       1   0 2      # Outcome Date GA Lbr Olegario/2nd Weight Sex Type Anes PTL Lv   4 Current            3 Term 23 38w4d  3650 g (8 lb 0.8 oz) M CS-LTranv Spinal N CINDY   2 Ectopic 2018 8w0d    ECTOPIC      1  14 35w5d  2929 g (6 lb 7.3 oz) M CS-LTranv Spinal Y CINDY     Past Medical History:   Diagnosis Date    Allergies     Anxiety     Asthma     Bipolar disorder     Depression     Kidney stone     Panic disorder     Placental abruption 2014    Premature labor after 22 weeks and before 37 weeks without delivery 2014    PTSD (post-traumatic stress disorder)     Status post  delivery 2014     Past Surgical History:   Procedure Laterality Date      SECTION       SECTION N/A 2023    Procedure:  SECTION REPEAT;  Surgeon: Marine Peterson DO;  Location: [facility] LABOR DELIVERY;  Service: Gynecology;  Laterality: N/A;     Family History   Problem Relation Age of Onset    Depression Father     ADD / ADHD Father     Depression Mother     Bipolar disorder Mother     ADD / ADHD Mother     Suicide Attempts Sister     Anxiety disorder Sister     ADD / ADHD Sister     ADD / ADHD Son     Diabetes Maternal Grandfather     Diabetes Other         UNCLE    Breast cancer Neg Hx     Uterine cancer Neg Hx     Ovarian cancer Neg Hx     Colon cancer Neg Hx      Allergies   Allergen Reactions    Erythromycin Unknown - Low Severity    Nitrofurantoin Macrocrystal Hives    Penicillins Hives     Social History     Socioeconomic History    Marital status: Single   Tobacco Use    Smoking status: Former     Current packs/day: 4.00     Average packs/day: 4.0 packs/day for 2.0 years (8.0 ttl pk-yrs)     Types: Cigarettes     Passive exposure: Past    Smokeless tobacco: Never   Vaping Use    Vaping status: Never Used   Substance and Sexual Activity    Alcohol use: Not Currently    Drug use: Not Currently     Types: Marijuana    Sexual activity: Not Currently     Partners: Male           ROS:  General ROS: negative for - chills or fatigue  Respiratory ROS: negative for - cough or hemoptysis  Cardiovascular ROS: negative for - chest pain or dyspnea on exertion  Gastrointestinal ROS: negative for - abdominal pain or appetite loss  Musculoskeletal ROS: negative for - gait disturbance or joint pain  Neurological ROS: negative for - behavioral changes or bowel and bladder control changes  Dermatological ROS: negative for rashes or lesions     Objective:  Physical Exam:     General appearance - alert, well appearing, and in no distress  Mental status - alert, oriented to person, place, and time  Neck- Supple.  No nodularity or enlargement.  Heart- Regular rate and rhythm  without murmur, gallop or rub.  Lungs- Clear to auscultation bilaterally, negative W/R/R  Breasts- Deferred to annual  Abdomen- Soft, Gravid uterus, non-tender  Extremeties: Normal ROM, Negative swelling or cyanosis  Neurological: Gait normal, Negative tingling or loss of sensation     Counseling:   Nutrition discussed, calories, activity/exercise in pregnancy  Discussed dietary restrictions/safety food preparation in pregnancy  Reviewed what to expect prenatal visits, office providers (female and male) and covering [facility]/Dr. Dotson  Appropriate trimester precautions provided, N/V, vag bleeding, cramping  VACCINE importance in pregnancy discussed.  Maternal and fetal risk of not being vaccinated reviewed NLT increased risk maternal/fetal severity of illness/death, PTD, CS, hemorrhage, HTN, possible IUFD.  Significant maternal and fetal/infant benefit w vaccination.  FDA approval and ACOG/SMFM/CDC strong recommendation in pregnancy.  Questions answered.   Questions answered  ANXIETY/DEPRESSION- We discussed treatment options R/B/A/SE/E expectant, THERAPY, SSRI, vs SSRI + Therapy.  Non medical options usually recommended first.  DEVAN reviewed.  Ideally weaning in third tri if possible. Some studies indicate child w increased risk Dep/Anx w SSRI use in preg.  Black box warning.  Recommend avoid abrupt discontinuation.  Discussed healthy weight gain.  Also discussed increased water intake, 200mg of caffeine daily, exercise and healthy eating habits.    Encouraged patient to consider breastfeeding. Discussed that it is recommended by the CDC and WHO that women exclusively breastfeed for the first 6 months and continue to breastfeed up to one year. Discussed the health benefits of breastfeeding, including increased immune systems in infants, reduced risk of food allergies, and reduced risk of chronic disease as an adult. Maternal benefits include more PP weight loss, reduced risk of PP depression, breast/ovarian  cancer risk reduced.     ASSESSMENT/PLAN:   Diagnoses and all orders for this visit:    1. Positive pregnancy test (Primary)    2. Encounter for supervision of normal pregnancy in first trimester, unspecified   -     POC Urinalysis Dipstick  -     OB Panel With HIV and RPR  -     Hemoglobin A1c  -     Chlamydia trachomatis, Neisseria gonorrhoeae, PCR - Urine, Urine, Random Void  -     Urine Culture - Urine, Urine, Clean Catch  -     Urine Drug Screen - Urine, Clean Catch  -     Jennifer Panorama Prenatal Test: Chromosomes 13, 18, 21, X & Y: Triploidy 22Q.11.2 Deletion - Blood, Blood, Venous  -     US Ob 14 + Weeks Single or First Gestation; Future    3. Hx of  delivery, currently pregnant  -     US Ob Transvaginal; Standing    4. Supervision of other normal pregnancy, antepartum    5. Rubella non-immune status, antepartum    6. Previous  section    7. Bipolar II disorder    8. History of placental abruption        Problems Addressed this Visit          Gynecologic and Obstetric Problems    History of placental abruption       Other    Bipolar II disorder    Hx of  delivery, currently pregnant    Relevant Orders    US Ob Transvaginal    Previous  section    Supervision of other normal pregnancy, antepartum     Other Visit Diagnoses       Positive pregnancy test    -  Primary    Encounter for supervision of normal pregnancy in first trimester, unspecified         Relevant Orders    POC Urinalysis Dipstick (Completed)    OB Panel With HIV and RPR    Hemoglobin A1c    Chlamydia trachomatis, Neisseria gonorrhoeae, PCR - Urine, Urine, Random Void    Urine Culture - Urine, Urine, Clean Catch    Urine Drug Screen - Urine, Clean Catch    Jennifer Panorama Prenatal Test: Chromosomes 13, 18, 21, X & Y: Triploidy 22Q.11.2 Deletion - Blood, Blood, Venous    US Ob 14 + Weeks Single or First Gestation    Rubella non-immune status, antepartum              Diagnoses         Codes Comments     Positive pregnancy test    -  Primary ICD-10-CM: Z32.01  ICD-9-CM: V72.42     Encounter for supervision of normal pregnancy in first trimester, unspecified      ICD-10-CM: Z34.91  ICD-9-CM: V22.1     Hx of  delivery, currently pregnant     ICD-10-CM: O09.899  ICD-9-CM: V23.89     Supervision of other normal pregnancy, antepartum     ICD-10-CM: Z34.80  ICD-9-CM: V22.1     Rubella non-immune status, antepartum     ICD-10-CM: O09.899, Z28.39  ICD-9-CM: 646.83, V15.83     Previous  section     ICD-10-CM: Z98.891  ICD-9-CM: V45.89     Bipolar II disorder     ICD-10-CM: F31.81  ICD-9-CM: 296.89     History of placental abruption     ICD-10-CM: Z87.59  ICD-9-CM: V13.29                     Return in about 4 weeks (around 10/29/2024) for Routine OB visit.    We have gone over the expected prenatal care to include the timing and content of visits including the anatomy ultrasound.  We discussed the content of the anatomy ultrasound and its limitations (the fact that ultrasound in general may not see up to 40% of abnormalities).  I informed her how to contact the office and/or on call person in the event of any problems and encouraged her to do so when she feels it is necessary.  We then spent time answering her questions which she indicated were answered to her satisfaction.    Leslie Kessler DO  10/1/2024 10:25 EDT

## 2024-10-01 NOTE — PATIENT INSTRUCTIONS
Venipuncture Blood Specimen Collection  Venipuncture performed in left arm by Deborah Qureshi with good hemostasis. Patient tolerated the procedure well without complications.   10/01/24   Deborah Qureshi

## 2024-10-02 LAB — BACTERIA SPEC AEROBE CULT: NORMAL

## 2024-10-03 LAB — RUBV IGG SERPL IA-ACNC: 1.41 INDEX

## 2024-11-03 NOTE — PROGRESS NOTES
Routine Prenatal Visit     Subjective  Shruti Pearl is a 29 y.o.  at 15w1d here for her routine OB visit.   She is taking her prenatal vitamins.Reports no loss of fluid or vaginal bleeding. Patient doing well without any complaints. Pregnancy complicated by:     Patient Active Problem List   Diagnosis    Bipolar II disorder    Supervision of other normal pregnancy, antepartum    Previous  section    Hx of  delivery, currently pregnant    History of placental abruption         OB History    Para Term  AB Living   4 2 1 1 1 2   SAB IAB Ectopic Molar Multiple Live Births       1   0 2      # Outcome Date GA Lbr Olegario/2nd Weight Sex Type Anes PTL Lv   4 Current            3 Term 23 38w4d  3650 g (8 lb 0.8 oz) M CS-LTranv Spinal N CINDY   2 Ectopic 2018 8w0d    ECTOPIC      1  14 35w5d  2929 g (6 lb 7.3 oz) M CS-LTranv Spinal Y CINDY           ROS:   General ROS: negative for - chills or fatigue  Respiratory ROS: negative for - cough or hemoptysis  Cardiovascular ROS: negative for - chest pain or dyspnea on exertion  Genito-Urinary ROS: negative for  change in urinary stream, vaginal discharge   Musculoskeletal ROS: negative for - gait disturbance or joint pain  Dermatological ROS: negative for acne,  dry skin or itching    Objective  Physical Exam:   Vitals:    24 1009   BP: 124/86       Uterine Size: FHT: 110-160 BPM    General appearance - alert, well appearing, and in no distress  Mental status - alert, oriented to person, place, and time  Abdomen- Soft, Gravid uterus, non-tender to palpation  Musculoskeletal: negative for - gait disturbance or joint pain  Extremeties: negative swelling or cyanosis   Dermatological: negative rashes or skin lesions       Assessment/Plan:   Diagnoses and all orders for this visit:    1. Supervision of other normal pregnancy, antepartum (Primary)  -     POC Urinalysis Dipstick    2. Hx of  delivery, currently  pregnant  Comments:  HAd term delivery G3    3. Rubella non-immune status, antepartum  Comments:  MMR PP    4. Previous  section  Comments:  Recommend repeat CS    5. Bipolar II disorder  Comments:  Stable, NO meds    6. History of placental abruption            Counseling:   Second trimester precautions  Round Ligament Pain:  The uterus has several ligaments which provide support and keep the uterus in place. As the  uterus grows these ligaments are pulled and stretched which often causes sharp stabbing like pain in the inguinal area.   You may find a pregnancy support band helpful. Changing positions may also help. Yoga is a great way to cope with round ligament and low back pain in pregnancy.    Massage may also help with low back pain   Things to Consider at this Point in your Pregnancy:  Some women experience swelling in their feet during pregnancy. Compression stockings may help  Drink plenty of water and stay active   Make sure you are eating frequent small meals, nuts are a wonderful snack to keep with you            Return in about 4 weeks (around 2024) for Routine OB visit.      We have gone over prenatal care to include the timing and content of visits. I informed her how to contact the office and/or on call person in the event of any problems and encouraged her to do so when she feels it is necessary.  We then spent time answering her questions which she indicated were answered to her satisfaction.    Leslie Kessler DO  2024 10:30 EST

## 2024-11-04 ENCOUNTER — ROUTINE PRENATAL (OUTPATIENT)
Dept: OBSTETRICS AND GYNECOLOGY | Facility: CLINIC | Age: 29
End: 2024-11-04
Payer: MEDICAID

## 2024-11-04 VITALS — DIASTOLIC BLOOD PRESSURE: 86 MMHG | BODY MASS INDEX: 38.62 KG/M2 | SYSTOLIC BLOOD PRESSURE: 124 MMHG | WEIGHT: 225 LBS

## 2024-11-04 DIAGNOSIS — O09.899 RUBELLA NON-IMMUNE STATUS, ANTEPARTUM: ICD-10-CM

## 2024-11-04 DIAGNOSIS — Z28.39 RUBELLA NON-IMMUNE STATUS, ANTEPARTUM: ICD-10-CM

## 2024-11-04 DIAGNOSIS — O09.899 HX OF PRETERM DELIVERY, CURRENTLY PREGNANT: ICD-10-CM

## 2024-11-04 DIAGNOSIS — F31.81 BIPOLAR II DISORDER: ICD-10-CM

## 2024-11-04 DIAGNOSIS — Z87.59 HISTORY OF PLACENTAL ABRUPTION: ICD-10-CM

## 2024-11-04 DIAGNOSIS — Z98.891 PREVIOUS CESAREAN SECTION: ICD-10-CM

## 2024-11-04 DIAGNOSIS — Z34.80 SUPERVISION OF OTHER NORMAL PREGNANCY, ANTEPARTUM: Primary | ICD-10-CM

## 2024-11-04 LAB
GLUCOSE UR STRIP-MCNC: NEGATIVE MG/DL
PROT UR STRIP-MCNC: NEGATIVE MG/DL

## 2024-11-04 PROCEDURE — 99213 OFFICE O/P EST LOW 20 MIN: CPT | Performed by: OBSTETRICS & GYNECOLOGY

## 2024-12-10 ENCOUNTER — REFERRAL TRIAGE (OUTPATIENT)
Dept: LABOR AND DELIVERY | Facility: HOSPITAL | Age: 29
End: 2024-12-10
Payer: COMMERCIAL

## 2024-12-10 ENCOUNTER — PATIENT OUTREACH (OUTPATIENT)
Dept: LABOR AND DELIVERY | Facility: HOSPITAL | Age: 29
End: 2024-12-10
Payer: COMMERCIAL

## 2024-12-10 ENCOUNTER — ROUTINE PRENATAL (OUTPATIENT)
Dept: OBSTETRICS AND GYNECOLOGY | Facility: CLINIC | Age: 29
End: 2024-12-10
Payer: COMMERCIAL

## 2024-12-10 VITALS — SYSTOLIC BLOOD PRESSURE: 124 MMHG | BODY MASS INDEX: 38.79 KG/M2 | DIASTOLIC BLOOD PRESSURE: 76 MMHG | WEIGHT: 226 LBS

## 2024-12-10 DIAGNOSIS — Z3A.20 20 WEEKS GESTATION OF PREGNANCY: Primary | ICD-10-CM

## 2024-12-10 DIAGNOSIS — Z98.891 PREVIOUS CESAREAN SECTION: ICD-10-CM

## 2024-12-10 DIAGNOSIS — Z87.59 HISTORY OF PLACENTAL ABRUPTION: ICD-10-CM

## 2024-12-10 DIAGNOSIS — F31.81 BIPOLAR II DISORDER: ICD-10-CM

## 2024-12-10 DIAGNOSIS — O09.899 RUBELLA NON-IMMUNE STATUS, ANTEPARTUM: ICD-10-CM

## 2024-12-10 DIAGNOSIS — O09.899 HX OF PRETERM DELIVERY, CURRENTLY PREGNANT: ICD-10-CM

## 2024-12-10 DIAGNOSIS — Z28.39 RUBELLA NON-IMMUNE STATUS, ANTEPARTUM: ICD-10-CM

## 2024-12-10 LAB
GLUCOSE UR STRIP-MCNC: NEGATIVE MG/DL
PROT UR STRIP-MCNC: NEGATIVE MG/DL

## 2024-12-10 NOTE — PROGRESS NOTES
Routine Prenatal Visit     Subjective  Shruti Pearl is a 29 y.o.  at 20w3d here for her routine OB visit.   She is taking her prenatal vitamins.Reports no loss of fluid or vaginal bleeding. Patient doing well without any complaints. Pregnancy is complicated by:     Patient Active Problem List   Diagnosis    Bipolar II disorder    Supervision of other normal pregnancy, antepartum    Previous  section    Hx of  delivery, currently pregnant    History of placental abruption         OB History    Para Term  AB Living   4 2 1 1 1 2   SAB IAB Ectopic Molar Multiple Live Births       1   0 2      # Outcome Date GA Lbr Olegario/2nd Weight Sex Type Anes PTL Lv   4 Current            3 Term 23 38w4d  3650 g (8 lb 0.8 oz) M CS-LTranv Spinal N CINDY   2 Ectopic 2018 8w0d    ECTOPIC      1  14 35w5d  2929 g (6 lb 7.3 oz) M CS-LTranv Spinal Y CINDY           ROS:   Review of Systems - General ROS: negative  Psychological ROS: negative  Endocrine ROS: negative  Breast ROS: negative  Respiratory ROS: negative  Cardiovascular ROS: negative  Gastrointestinal ROS: negative  Genito-Urinary ROS: negative  Musculoskeletal ROS: negative  Neurological ROS: negative  Dermatological ROS: negative    Objective  Physical Exam:   Vitals:    12/10/24 1110   BP: 124/76       Uterine Size: not examined, US today  FHT: 110-160 BPM    General appearance - alert, well appearing, and in no distress  Mental status - alert, oriented to person, place, and time  Abdomen- Soft, Gravid uterus, non-tender to palpation  Musculoskeletal: negative for - gait disturbance or joint pain  Extremeties: negative swelling or cyanosis   Dermatological: negative rashes or skin lesions       Assessment/Plan:   Diagnoses and all orders for this visit:    1. 20 weeks gestation of pregnancy (Primary)  -     POC Urinalysis Dipstick  -     US Ob 14 + Weeks Single or First Gestation; Future    2. Hx of  delivery,  currently pregnant  Comments:  35 week delivery due to placental abruption(g1)  G3 term pregnancy    3. Rubella non-immune status, antepartum  Comments:  MMR PP    4. Previous  section  Comments:  Repeat CS    5. Bipolar II disorder  Comments:  Stable, no meds    6. History of placental abruption          Counseling:   Second trimester precautions  OB precautions, leaking, VB, radha acosta vs PTL/Labor  HTN precautions reviewed: HA, vision change, RUQ/epigastric pain, edema  Round Ligament Pain:  The uterus has several ligaments which provide support and keep the uterus in place. As the  uterus grows these ligaments are pulled and stretched which often causes sharp stabbing like pain in the inguinal area.   You may find a pregnancy support band helpful. Changing positions may also help. Yoga is a great way to cope with round ligament and low back pain in pregnancy.    Massage may also help with low back pain   Things to Consider at this Point in your Pregnancy:  Some women experience swelling in their feet during pregnancy. Compression stockings may help  Drink plenty of water and stay active   Make sure you are eating frequent small meals, nuts are a wonderful snack to keep with you            Return in about 4 weeks (around 2025) for Routine OB visit.      We have gone over prenatal care to include the timing and content of visits. I informed her how to contact the office and/or on call person in the event of any problems and encouraged her to do so when she feels it is necessary.  We then spent time answering her questions which she indicated were answered to her satisfaction.    Leslie Kessler DO  12/10/2024 12:31 EST

## 2024-12-10 NOTE — OUTREACH NOTE
[facility]  Enrollment    Current Estimated Gestational Age: 20w3d    Questions/Answers      Flowsheet Row Responses   Would like to participate? Yes   Date of Intake Visit 12/10/24        [facility]  Intake    Current Estimated Gestational Age: 20w3d    Intake Assessment      Flowsheet Row Responses   Best Method for Contacting Cell   Able to keep appointments as scheduled Yes   Gender(s) and Name(s) Girl   Resources Presently Utilizing: None   Maternal Warning Signs Provided   Other: Provided   Other Education [facility] Benefits, Insurance benefits/Incentives, HANDS            Learning Assessment      Flowsheet Row Responses   Relationship Patient, Family   Does the learner have any barriers to learning? No Barriers   What is the preferred language of the learner for medical teaching? English   Is an  required? No            Pediatrician:   FOB:   Feeding Plan:     No current concerns with food, housing or transportation.  Encouraged to reach out for any questions, needs or concerns.      Tobacco, Alcohol, and Drug History     reports that she has quit smoking. Her smoking use included cigarettes. She has a 8 pack-year smoking history. She has been exposed to tobacco smoke. She has never used smokeless tobacco.   reports that she does not currently use alcohol.   reports that she does not currently use drugs after having used the following drugs: Marijuana.    Francia Holloway RN  Maternity Nurse Navigator    12/10/2024, 11:13 EST

## 2024-12-17 ENCOUNTER — TELEPHONE (OUTPATIENT)
Dept: OBSTETRICS AND GYNECOLOGY | Facility: CLINIC | Age: 29
End: 2024-12-17
Payer: COMMERCIAL

## 2024-12-17 NOTE — TELEPHONE ENCOUNTER
[id number]: Darryl re reggie contact this pt regarding her us only appt on 12/23.  We only have one tech that day.  You can offer her 8am, 2 pm, 3 or 330 pm on 12/26.  Just let me know which works for her.  thx. I left my name for her to ask for me.

## 2024-12-20 ENCOUNTER — TELEPHONE (OUTPATIENT)
Dept: OBSTETRICS AND GYNECOLOGY | Facility: CLINIC | Age: 29
End: 2024-12-20
Payer: COMMERCIAL

## 2024-12-20 NOTE — TELEPHONE ENCOUNTER
12/20/24:  She has a cervical length only us on 12/27 that needs to be moved to another time that day.  You can offer her any of the open slots that day on Octavio or Trisha's schedule. Thx. I left her a message to call office to maylin time of appt

## 2025-01-07 ENCOUNTER — ROUTINE PRENATAL (OUTPATIENT)
Dept: OBSTETRICS AND GYNECOLOGY | Facility: CLINIC | Age: 30
End: 2025-01-07
Payer: COMMERCIAL

## 2025-01-07 VITALS — DIASTOLIC BLOOD PRESSURE: 78 MMHG | WEIGHT: 228 LBS | BODY MASS INDEX: 39.14 KG/M2 | SYSTOLIC BLOOD PRESSURE: 134 MMHG

## 2025-01-07 DIAGNOSIS — Z98.891 PREVIOUS CESAREAN SECTION: ICD-10-CM

## 2025-01-07 DIAGNOSIS — Z28.39 RUBELLA NON-IMMUNE STATUS, ANTEPARTUM: ICD-10-CM

## 2025-01-07 DIAGNOSIS — O09.899 RUBELLA NON-IMMUNE STATUS, ANTEPARTUM: ICD-10-CM

## 2025-01-07 DIAGNOSIS — O09.899 HX OF PRETERM DELIVERY, CURRENTLY PREGNANT: ICD-10-CM

## 2025-01-07 DIAGNOSIS — Z87.59 HISTORY OF PLACENTAL ABRUPTION: ICD-10-CM

## 2025-01-07 DIAGNOSIS — Z3A.24 24 WEEKS GESTATION OF PREGNANCY: Primary | ICD-10-CM

## 2025-01-07 LAB
DEPRECATED RDW RBC AUTO: 37.5 FL (ref 37–54)
ERYTHROCYTE [DISTWIDTH] IN BLOOD BY AUTOMATED COUNT: 13.2 % (ref 12.3–15.4)
GLUCOSE 1H P GLC SERPL-MCNC: 143 MG/DL (ref 65–139)
GLUCOSE UR STRIP-MCNC: NEGATIVE MG/DL
HCT VFR BLD AUTO: 34.6 % (ref 34–46.6)
HGB BLD-MCNC: 11.2 G/DL (ref 12–15.9)
MCH RBC QN AUTO: 25.7 PG (ref 26.6–33)
MCHC RBC AUTO-ENTMCNC: 32.4 G/DL (ref 31.5–35.7)
MCV RBC AUTO: 79.4 FL (ref 79–97)
PLATELET # BLD AUTO: 221 10*3/MM3 (ref 140–450)
PMV BLD AUTO: 10.9 FL (ref 6–12)
PROT UR STRIP-MCNC: NEGATIVE MG/DL
RBC # BLD AUTO: 4.36 10*6/MM3 (ref 3.77–5.28)
TREPONEMA PALLIDUM IGG+IGM AB [PRESENCE] IN SERUM OR PLASMA BY IMMUNOASSAY: NORMAL
WBC NRBC COR # BLD AUTO: 8.37 10*3/MM3 (ref 3.4–10.8)

## 2025-01-07 PROCEDURE — 82950 GLUCOSE TEST: CPT | Performed by: OBSTETRICS & GYNECOLOGY

## 2025-01-07 PROCEDURE — 85027 COMPLETE CBC AUTOMATED: CPT | Performed by: OBSTETRICS & GYNECOLOGY

## 2025-01-07 PROCEDURE — 86780 TREPONEMA PALLIDUM: CPT | Performed by: OBSTETRICS & GYNECOLOGY

## 2025-01-07 NOTE — PROGRESS NOTES
Routine Prenatal Visit     Subjective  Shruti Pearl is a 29 y.o.  at 24w3d here for her routine OB visit.   She is taking her prenatal vitamins.Reports no loss of fluid or vaginal bleeding. Patient doing well without any complaints. Pregnancy complicated by:     Patient Active Problem List   Diagnosis    Bipolar II disorder    Supervision of other normal pregnancy, antepartum    Previous  section    Hx of  delivery, currently pregnant    History of placental abruption         OB History    Para Term  AB Living   4 2 1 1 1 2   SAB IAB Ectopic Molar Multiple Live Births       1   0 2      # Outcome Date GA Lbr Olegario/2nd Weight Sex Type Anes PTL Lv   4 Current            3 Term 23 38w4d  3650 g (8 lb 0.8 oz) M CS-LTranv Spinal N CINDY   2 Ectopic 2018 8w0d    ECTOPIC      1  14 35w5d  2929 g (6 lb 7.3 oz) M CS-LTranv Spinal Y CINDY           ROS:   Review of Systems - General ROS: negative  Psychological ROS: negative  Endocrine ROS: negative  Breast ROS: negative  Respiratory ROS: negative  Cardiovascular ROS: negative  Gastrointestinal ROS: negative  Genito-Urinary ROS: negative  Musculoskeletal ROS: negative  Neurological ROS: negative  Dermatological ROS: negative    Objective  Physical Exam:   Vitals:    25 1024   BP: 134/78       Uterine Size: not examined, US today  FHT: 110-160 BPM    General appearance - alert, well appearing, and in no distress  Mental status - alert, oriented to person, place, and time  Abdomen- Soft, Gravid uterus, non-tender to palpation  Musculoskeletal: negative for - gait disturbance or joint pain  Extremeties: negative swelling or cyanosis   Dermatological: negative rashes or skin lesions       Assessment/Plan:   Diagnoses and all orders for this visit:    1. 24 weeks gestation of pregnancy (Primary)  -     POC Urinalysis Dipstick  -     Gestational Diabetes Screen 1 Hour  -     CBC (No Diff)  -     Treponema pallidum AB  w/Reflex RPR    2. Hx of  delivery, currently pregnant  Comments:  Cervical lengths WNL    3. Rubella non-immune status, antepartum  Comments:  MMR PP    4. Previous  section  Comments:  Recommend repeat CS    5. History of placental abruption            Counseling:   OB precautions, leaking, VB, radha acosta vs PTL/Labor  FKC  HTN precautions reviewed: HA, vision change, RUQ/epigastric pain, edema  Round Ligament Pain:  The uterus has several ligaments which provide support and keep the uterus in place. As the  uterus grows these ligaments are pulled and stretched which often causes sharp stabbing like pain in the inguinal area.   You may find a pregnancy support band helpful. Changing positions may also help. Yoga is a great way to cope with round ligament and low back pain in pregnancy.    Massage may also help with low back pain   Things to Consider at this Point in your Pregnancy:  Some women experience swelling in their feet during pregnancy. Compression stockings may help  Drink plenty of water and stay active   Make sure you are eating frequent small meals, nuts are a wonderful snack to keep with you            Return in about 4 weeks (around 2025) for Routine OB visit.      We have gone over prenatal care to include the timing and content of visits. I informed her how to contact the office and/or on call person in the event of any problems and encouraged her to do so when she feels it is necessary.  We then spent time answering her questions which she indicated were answered to her satisfaction.    Leslie Kessler DO  2025 10:33 EST

## 2025-01-08 ENCOUNTER — HOSPITAL ENCOUNTER (OUTPATIENT)
Facility: HOSPITAL | Age: 30
Discharge: HOME | End: 2025-01-08
Attending: OBSTETRICS & GYNECOLOGY | Admitting: OBSTETRICS & GYNECOLOGY
Payer: COMMERCIAL

## 2025-01-08 ENCOUNTER — APPOINTMENT (OUTPATIENT)
Dept: ULTRASOUND IMAGING | Facility: HOSPITAL | Age: 30
End: 2025-01-08
Payer: COMMERCIAL

## 2025-01-08 VITALS
BODY MASS INDEX: 39.6 KG/M2 | SYSTOLIC BLOOD PRESSURE: 106 MMHG | HEIGHT: 64 IN | DIASTOLIC BLOOD PRESSURE: 72 MMHG | WEIGHT: 231.92 LBS | HEART RATE: 106 BPM | RESPIRATION RATE: 18 BRPM | TEMPERATURE: 99 F

## 2025-01-08 LAB
ABO GROUP BLD: NORMAL
APTT PPP: 25.1 SECONDS (ref 22.7–35.4)
BASOPHILS # BLD AUTO: 0.02 10*3/MM3 (ref 0–0.2)
BASOPHILS NFR BLD AUTO: 0.3 % (ref 0–1.5)
BLD GP AB SCN SERPL QL: NEGATIVE
DEPRECATED RDW RBC AUTO: 37.9 FL (ref 37–54)
EOSINOPHIL # BLD AUTO: 0.15 10*3/MM3 (ref 0–0.4)
EOSINOPHIL NFR BLD AUTO: 1.9 % (ref 0.3–6.2)
ERYTHROCYTE [DISTWIDTH] IN BLOOD BY AUTOMATED COUNT: 13.3 % (ref 12.3–15.4)
FIBRINOGEN PPP-MCNC: 512 MG/DL (ref 219–464)
HCT VFR BLD AUTO: 31.9 % (ref 34–46.6)
HGB BLD-MCNC: 10.4 G/DL (ref 12–15.9)
HGB F BLD QL KLEIH BETKE: NORMAL
HOLD SPECIMEN: NORMAL
HOLD SPECIMEN: NORMAL
IMM GRANULOCYTES # BLD AUTO: 0.04 10*3/MM3 (ref 0–0.05)
IMM GRANULOCYTES NFR BLD AUTO: 0.5 % (ref 0–0.5)
INR PPP: 0.95 (ref 0.9–1.1)
LYMPHOCYTES # BLD AUTO: 1.47 10*3/MM3 (ref 0.7–3.1)
LYMPHOCYTES NFR BLD AUTO: 18.8 % (ref 19.6–45.3)
MCH RBC QN AUTO: 25.9 PG (ref 26.6–33)
MCHC RBC AUTO-ENTMCNC: 32.6 G/DL (ref 31.5–35.7)
MCV RBC AUTO: 79.4 FL (ref 79–97)
MONOCYTES # BLD AUTO: 0.45 10*3/MM3 (ref 0.1–0.9)
MONOCYTES NFR BLD AUTO: 5.7 % (ref 5–12)
NEUTROPHILS NFR BLD AUTO: 5.7 10*3/MM3 (ref 1.7–7)
NEUTROPHILS NFR BLD AUTO: 72.8 % (ref 42.7–76)
NRBC BLD AUTO-RTO: 0 /100 WBC (ref 0–0.2)
PLATELET # BLD AUTO: 189 10*3/MM3 (ref 140–450)
PMV BLD AUTO: 9.7 FL (ref 6–12)
PROTHROMBIN TIME: 12.7 SECONDS (ref 11.7–14.2)
RBC # BLD AUTO: 4.02 10*6/MM3 (ref 3.77–5.28)
RH BLD: POSITIVE
T&S EXPIRATION DATE: NORMAL
WBC NRBC COR # BLD AUTO: 7.83 10*3/MM3 (ref 3.4–10.8)

## 2025-01-08 PROCEDURE — G0463 HOSPITAL OUTPT CLINIC VISIT: HCPCS

## 2025-01-08 PROCEDURE — 86901 BLOOD TYPING SEROLOGIC RH(D): CPT

## 2025-01-08 PROCEDURE — 76817 TRANSVAGINAL US OBSTETRIC: CPT

## 2025-01-08 PROCEDURE — 86900 BLOOD TYPING SEROLOGIC ABO: CPT | Performed by: OBSTETRICS & GYNECOLOGY

## 2025-01-08 PROCEDURE — 85730 THROMBOPLASTIN TIME PARTIAL: CPT | Performed by: OBSTETRICS & GYNECOLOGY

## 2025-01-08 PROCEDURE — 85025 COMPLETE CBC W/AUTO DIFF WBC: CPT | Performed by: OBSTETRICS & GYNECOLOGY

## 2025-01-08 PROCEDURE — 85460 HEMOGLOBIN FETAL: CPT | Performed by: OBSTETRICS & GYNECOLOGY

## 2025-01-08 PROCEDURE — 85610 PROTHROMBIN TIME: CPT | Performed by: OBSTETRICS & GYNECOLOGY

## 2025-01-08 PROCEDURE — 86900 BLOOD TYPING SEROLOGIC ABO: CPT

## 2025-01-08 PROCEDURE — 86850 RBC ANTIBODY SCREEN: CPT | Performed by: OBSTETRICS & GYNECOLOGY

## 2025-01-08 PROCEDURE — 86901 BLOOD TYPING SEROLOGIC RH(D): CPT | Performed by: OBSTETRICS & GYNECOLOGY

## 2025-01-08 PROCEDURE — 85384 FIBRINOGEN ACTIVITY: CPT | Performed by: OBSTETRICS & GYNECOLOGY

## 2025-01-08 PROCEDURE — 76815 OB US LIMITED FETUS(S): CPT

## 2025-01-13 ENCOUNTER — TELEPHONE (OUTPATIENT)
Dept: OBSTETRICS AND GYNECOLOGY | Facility: CLINIC | Age: 30
End: 2025-01-13
Payer: COMMERCIAL

## 2025-01-13 NOTE — TELEPHONE ENCOUNTER
----- Message from Leslie Kessler sent at 1/8/2025  7:32 AM EST -----  Failed 1 hr GTT, needs 3 hr GTT ordered and scheduled.     Electronically signed by:    Leslie Kessler DO  01/08/25  07:32 EST

## 2025-01-20 ENCOUNTER — HOSPITAL ENCOUNTER (EMERGENCY)
Facility: HOSPITAL | Age: 30
Discharge: HOME | End: 2025-01-20
Attending: OBSTETRICS & GYNECOLOGY | Admitting: OBSTETRICS & GYNECOLOGY
Payer: COMMERCIAL

## 2025-01-20 VITALS
TEMPERATURE: 97.6 F | RESPIRATION RATE: 16 BRPM | HEART RATE: 95 BPM | WEIGHT: 230 LBS | DIASTOLIC BLOOD PRESSURE: 78 MMHG | SYSTOLIC BLOOD PRESSURE: 110 MMHG | BODY MASS INDEX: 39.48 KG/M2

## 2025-01-20 LAB
BACTERIA UR QL AUTO: ABNORMAL /HPF
BILIRUB UR QL STRIP: NEGATIVE
CLARITY UR: ABNORMAL
COD CRY URNS QL: ABNORMAL /HPF
COLOR UR: YELLOW
GLUCOSE UR STRIP-MCNC: NEGATIVE MG/DL
HGB UR QL STRIP.AUTO: NEGATIVE
HYALINE CASTS UR QL AUTO: ABNORMAL /LPF
KETONES UR QL STRIP: ABNORMAL
LEUKOCYTE ESTERASE UR QL STRIP.AUTO: ABNORMAL
NITRITE UR QL STRIP: NEGATIVE
PH UR STRIP.AUTO: 5.5 [PH] (ref 5–8)
PROT UR QL STRIP: NEGATIVE
RBC # UR STRIP: ABNORMAL /HPF
REF LAB TEST METHOD: ABNORMAL
SP GR UR STRIP: 1.03 (ref 1–1.03)
SQUAMOUS #/AREA URNS HPF: ABNORMAL /HPF
UROBILINOGEN UR QL STRIP: ABNORMAL
WBC # UR STRIP: ABNORMAL /HPF

## 2025-01-20 PROCEDURE — 99284 EMERGENCY DEPT VISIT MOD MDM: CPT | Performed by: OBSTETRICS & GYNECOLOGY

## 2025-01-20 PROCEDURE — 87086 URINE CULTURE/COLONY COUNT: CPT | Performed by: OBSTETRICS & GYNECOLOGY

## 2025-01-20 PROCEDURE — 81001 URINALYSIS AUTO W/SCOPE: CPT | Performed by: OBSTETRICS & GYNECOLOGY

## 2025-01-20 PROCEDURE — 59025 FETAL NON-STRESS TEST: CPT

## 2025-01-20 NOTE — OBED NOTES
[facility] Note OB        Patient Name: Shruti Pearl  YOB: 1995  MRN: [id number]  Admission Date: 2025  2:14 PM  Date of Service: 2025    Chief Complaint: Decreased Fetal Movement (Pt reports she has not felt baby move at all today. Pt also reports increase in radha acosta within the last 4 hours. Pt denies LOF/blood. )        Subjective     Shruti Pearl is a 29 y.o. female  at 26w2d with Estimated Date of Delivery: 25 who presents with the chief complaint listed above.  She has been feeling a few Radha-Acosta contractions.  While in [facility] she began to feel active fetal movement.  She sees Dr. Kessler for her prenatal care. Her pregnancy has been complicated by:  history of  delivery, history of placental abruption, previous  section x 2.    She describes fetal movement as initially decreased, now normal.  She denies rupture of membranes.  She denies vaginal bleeding. She is not feeling contractions.          Objective   Patient Active Problem List    Diagnosis     History of placental abruption [Z87.59]     Supervision of other normal pregnancy, antepartum [Z34.80]     Previous  section [Z98.891]     Hx of  delivery, currently pregnant [O09.899]     Bipolar II disorder [F31.81]         OB History    Para Term  AB Living   4 2 1 1 1 2   SAB IAB Ectopic Molar Multiple Live Births   0 0 1 0 0 2      # Outcome Date GA Lbr Olegario/2nd Weight Sex Type Anes PTL Lv   4 Current            3 Term 23 38w4d  3650 g (8 lb 0.8 oz) M CS-LTranv Spinal N CINDY      Name: Rajeev Galeano      Apgar1: 7  Apgar5: 8   2 Ectopic 2018 8w0d    ECTOPIC      1  14 35w5d  2929 g (6 lb 7.3 oz) M CS-LTranv Spinal Y CINDY      Apgar1: 6  Apgar5: 9        Past Medical History:   Diagnosis Date    Allergies     Anxiety     Asthma     Bipolar disorder     Depression     Kidney stone     Panic disorder     Placental abruption 2014    Premature  labor after 22 weeks and before 37 weeks without delivery 2014    PTSD (post-traumatic stress disorder)     Status post  delivery 2014       Past Surgical History:   Procedure Laterality Date     SECTION       SECTION N/A 2023    Procedure:  SECTION REPEAT;  Surgeon: Marine Peterson DO;  Location: [facility] LABOR DELIVERY;  Service: Gynecology;  Laterality: N/A;       No current facility-administered medications on file prior to encounter.     Current Outpatient Medications on File Prior to Encounter   Medication Sig Dispense Refill    prenatal vitamin (prenatal, CLASSIC, vitamin) tablet Take  by mouth Daily.      [DISCONTINUED] busPIRone (BUSPAR) 10 MG tablet Take 1 tablet by mouth 3 (Three) Times a Day. 90 tablet 1    [DISCONTINUED] escitalopram (Lexapro) 10 MG tablet Take 1 tablet by mouth Daily. 30 tablet 0       Allergies   Allergen Reactions    Erythromycin Unknown - Low Severity    Nitrofurantoin Macrocrystal Hives    Penicillins Hives       Family History   Problem Relation Age of Onset    Depression Father     ADD / ADHD Father     Depression Mother     Bipolar disorder Mother     ADD / ADHD Mother     Suicide Attempts Sister     Anxiety disorder Sister     ADD / ADHD Sister     ADD / ADHD Son     Diabetes Maternal Grandfather     Diabetes Other         UNCLE    Breast cancer Neg Hx     Uterine cancer Neg Hx     Ovarian cancer Neg Hx     Colon cancer Neg Hx        Social History     Socioeconomic History    Marital status: Single    Number of children: 2    Years of education: 14    Highest education level: High school graduate   Tobacco Use    Smoking status: Former     Current packs/day: 4.00     Average packs/day: 4.0 packs/day for 2.0 years (8.0 ttl pk-yrs)     Types: Cigarettes     Passive exposure: Past    Smokeless tobacco: Never   Vaping Use    Vaping status: Never Used   Substance and Sexual Activity    Alcohol use: Not Currently    Drug use: Not  Currently     Types: Marijuana    Sexual activity: Not Currently     Partners: Male           Review of Systems   Constitutional: Negative.    HENT: Negative.     Respiratory: Negative.     Cardiovascular: Negative.    Gastrointestinal: Negative.    Genitourinary: Negative.    Neurological: Negative.         PHYSICAL EXAM:      VITAL SIGNS:  Vitals:    25 1415 25 1430   BP:  110/78   BP Location:  Right arm   Patient Position:  Sitting   Pulse:  95   Resp:  16   Temp:  97.6 °F (36.4 °C)   TempSrc:  Oral   Weight: 104 kg (230 lb)         FHT:                   Baseline:  140 BPM  Variability:  Moderate = 6 - 25 BPM  Accelerations:  10 x 10 accelerations present     Decelerations:  absent  Contractions:   absent     Interpretation:    Reactive NST for gestational age, CAT 1 tracing        PHYSICAL EXAM:    General: well developed; well nourished  no acute distress  mentation appropriate   Heart: Not performed.   Lungs  : breathing is unlabored     Abdomen: soft, non-tender; no masses  no umbilical or inguinal hernias are present  no hepato-splenomegaly       Cervix: was checked (by RN): 0 cm / 0 % / -3  Cervical Dilation (cm): 0  Cervical Effacement: 0  Fetal Station: -3  Cervical Position: posterior   Contractions: none        Extremities: peripheral pulses normal, no pedal edema, no clubbing or cyanosis      LABS AND TESTING ORDERED:  Uterine and fetal monitoring  Urinalysis      LAB RESULTS:    No results found for this or any previous visit (from the past 24 hours).    Lab Results   Component Value Date    ABO B 2025    RH Positive 2025       Lab Results   Component Value Date    STREPGPB Negative 2023                 Assessment & Plan     ASSESSMENT/PLAN:  Shruti Pearl is a 29 y.o. female  at 26w2d who presented with:     Decreased fetal movement  [place]ar[place]-Donahue contractions        PLAN:     Patient began to feel fetal movement while in [facility]  Her cervix is closed  Patient  discharged home with labor precautions.  She will keep her scheduled outpatient prenatal appointment.    I have spent 30 minutes including face to face time with the patient, greater than 50% in discussion of the diagnosis (counseling) and/or coordination of care.     Mone Servin MD  1/20/2025  14:59 EST  OB Hospitalist  Phone:  [phone]

## 2025-01-22 LAB — BACTERIA SPEC AEROBE CULT: NO GROWTH

## 2025-01-28 ENCOUNTER — HOSPITAL ENCOUNTER (EMERGENCY)
Facility: HOSPITAL | Age: 30
Discharge: HOME | End: 2025-01-28
Attending: OBSTETRICS & GYNECOLOGY | Admitting: OBSTETRICS & GYNECOLOGY
Payer: COMMERCIAL

## 2025-01-28 VITALS
HEART RATE: 92 BPM | DIASTOLIC BLOOD PRESSURE: 78 MMHG | OXYGEN SATURATION: 98 % | SYSTOLIC BLOOD PRESSURE: 116 MMHG | TEMPERATURE: 98 F | RESPIRATION RATE: 18 BRPM

## 2025-01-28 LAB
BACTERIA UR QL AUTO: ABNORMAL /HPF
BILIRUB UR QL STRIP: NEGATIVE
CLARITY UR: ABNORMAL
COLOR UR: YELLOW
GLUCOSE UR STRIP-MCNC: NEGATIVE MG/DL
HGB UR QL STRIP.AUTO: NEGATIVE
HYALINE CASTS UR QL AUTO: ABNORMAL /LPF
KETONES UR QL STRIP: NEGATIVE
LEUKOCYTE ESTERASE UR QL STRIP.AUTO: ABNORMAL
NITRITE UR QL STRIP: NEGATIVE
PH UR STRIP.AUTO: 6.5 [PH] (ref 5–8)
PROT UR QL STRIP: NEGATIVE
RBC # UR STRIP: ABNORMAL /HPF
REF LAB TEST METHOD: ABNORMAL
SP GR UR STRIP: 1.02 (ref 1–1.03)
SQUAMOUS #/AREA URNS HPF: ABNORMAL /HPF
UROBILINOGEN UR QL STRIP: ABNORMAL
WBC # UR STRIP: ABNORMAL /HPF

## 2025-01-28 PROCEDURE — 99284 EMERGENCY DEPT VISIT MOD MDM: CPT | Performed by: OBSTETRICS & GYNECOLOGY

## 2025-01-28 PROCEDURE — 59025 FETAL NON-STRESS TEST: CPT

## 2025-01-28 PROCEDURE — 81001 URINALYSIS AUTO W/SCOPE: CPT | Performed by: OBSTETRICS & GYNECOLOGY

## 2025-01-29 NOTE — NURSING NOTE
Discharged to home with instructions to keep next MD appointment, and to return to labor and delivery with contractions that are 5 minutes for 1 hour and are getting stronger, vaginal bleeding (informed that there might be some vaginal spotting after tonight's cervical exam), rupture of membranes, increased pain, decreased fetal movement.  Verbalizes knowledge of home instructions.

## 2025-01-29 NOTE — NURSING NOTE
"  Shruti Pearl, a  at 27w3d with an TALIB of 2025, Date entered prior to episode creation, was seen at [facility] OBSTETRIC EMERGENCY DEPARTMENT for a nonstress test.    Chief Complaint   Patient presents with    Contractions     Arrived to [facility] with complaints of abd pain that started earlier today around 930 that \"comes and goes\".  Did not call MD, currently sees Dr. Wesley in [facility].  Denies vaginal bleeding or discharge.  States, \"baby active\".  History of kidney stones, last 2 years ago.  States,  \"does not feel like a kidney stone\".       Patient Active Problem List   Diagnosis    Bipolar II disorder    Supervision of other normal pregnancy, antepartum    Previous  section    Hx of  delivery, currently pregnant    History of placental abruption       Start Time:   Stop Time:     Interpretation A  Nonstress Test Interpretation A: Reactive        RHINA Gonzalez RN      "

## 2025-01-29 NOTE — OBED NOTES
"[facility] Note [facility]        Patient Name: Shruti Pearl  YOB: 1995  MRN: [id number]  Admission Date: 2025  6:58 PM  Date of Service: 2025    Chief Complaint: Contractions (Arrived to [facility] with complaints of abd pain that started earlier today around 930 that \"comes and goes\".  Did not call MD, currently sees Dr. Wesley in [facility].  Denies vaginal bleeding or discharge.  States, \"baby active\".  History of kidney stones, last 2 years ago.  States,  \"does not feel like a kidney stone\".)        Subjective     Shruti Pearl is a 29 y.o. female  at 27w3d with Estimated Date of Delivery: 25 who presents with the chief complaint listed above.  She has felt abdominal tightening throughout the day and came to the hospital for evaluation.  She sees Dr. Kessler at [facility] for her prenatal care. Her pregnancy has been complicated by:  history of placental abruption with  delivery, two previous  sections.    She describes fetal movement as normal.  She denies rupture of membranes.  She denies vaginal bleeding. She is feeling contractions.          Objective   Patient Active Problem List    Diagnosis     History of placental abruption [Z87.59]     Supervision of other normal pregnancy, antepartum [Z34.80]     Previous  section [Z98.891]     Hx of  delivery, currently pregnant [O09.899]     Bipolar II disorder [F31.81]         OB History    Para Term  AB Living   4 2 1 1 1 2   SAB IAB Ectopic Molar Multiple Live Births   0 0 1 0 0 2      # Outcome Date GA Lbr Olegario/2nd Weight Sex Type Anes PTL Lv   4 Current            3 Term 23 38w4d  3650 g (8 lb 0.8 oz) M CS-LTranv Spinal N CINDY      Name: Rajeev Galeano      Apgar1: 7  Apgar5: 8   2 Ectopic 2018 8w0d    ECTOPIC      1  14 35w5d  2929 g (6 lb 7.3 oz) M CS-LTranv Spinal Y CINDY      Apgar1: 6  Apgar5: 9        Past Medical History:   Diagnosis Date    Allergies     Anxiety     " Asthma     Bipolar disorder     Depression     Kidney stone     Panic disorder     Placental abruption 2014    Premature labor after 22 weeks and before 37 weeks without delivery 2014    PTSD (post-traumatic stress disorder)     Status post  delivery 2014       Past Surgical History:   Procedure Laterality Date     SECTION       SECTION N/A 2023    Procedure:  SECTION REPEAT;  Surgeon: Marine Peterson DO;  Location: [facility] LABOR DELIVERY;  Service: Gynecology;  Laterality: N/A;       No current facility-administered medications on file prior to encounter.     Current Outpatient Medications on File Prior to Encounter   Medication Sig Dispense Refill    prenatal vitamin (prenatal, CLASSIC, vitamin) tablet Take  by mouth Daily.      [DISCONTINUED] busPIRone (BUSPAR) 10 MG tablet Take 1 tablet by mouth 3 (Three) Times a Day. 90 tablet 1    [DISCONTINUED] escitalopram (Lexapro) 10 MG tablet Take 1 tablet by mouth Daily. 30 tablet 0       Allergies   Allergen Reactions    Erythromycin Unknown - Low Severity    Nitrofurantoin Macrocrystal Hives    Penicillins Hives       Family History   Problem Relation Age of Onset    Depression Father     ADD / ADHD Father     Depression Mother     Bipolar disorder Mother     ADD / ADHD Mother     Suicide Attempts Sister     Anxiety disorder Sister     ADD / ADHD Sister     ADD / ADHD Son     Diabetes Maternal Grandfather     Diabetes Other         UNCLE    Breast cancer Neg Hx     Uterine cancer Neg Hx     Ovarian cancer Neg Hx     Colon cancer Neg Hx        Social History     Socioeconomic History    Marital status: Single    Number of children: 2    Years of education: 14    Highest education level: High school graduate   Tobacco Use    Smoking status: Former     Current packs/day: 4.00     Average packs/day: 4.0 packs/day for 2.0 years (8.0 ttl pk-yrs)     Types: Cigarettes     Passive exposure: Past    Smokeless tobacco: Never    Vaping Use    Vaping status: Never Used   Substance and Sexual Activity    Alcohol use: Not Currently    Drug use: Not Currently     Types: Marijuana    Sexual activity: Not Currently     Partners: Male           Review of Systems   Constitutional: Negative.    HENT: Negative.     Respiratory: Negative.     Cardiovascular: Negative.    Gastrointestinal:  Positive for abdominal pain.   Genitourinary: Negative.    Musculoskeletal:  Positive for back pain.   Neurological: Negative.        PHYSICAL EXAM:      VITAL SIGNS:  Vitals:    25 1917   BP: 101/63   BP Location: Right arm   Patient Position: Lying   Pulse: 98   Resp: 18   Temp: 98 °F (36.7 °C)   TempSrc: Oral   SpO2: 98%        FHT:                   Baseline:  150 BPM  Variability:  Moderate = 6 - 25 BPM  Accelerations:  10 x 10 accelerations present     Decelerations:  absent  Contractions:   absent     Interpretation:    Reactive NST for gestational age, CAT 1 tracing        PHYSICAL EXAM:    General: well developed; well nourished  no acute distress  mentation appropriate   Heart: Not performed.   Lungs  : breathing is unlabored     Abdomen: soft, non-tender; no masses       Cervix: was checked (by RN): 0 cm / 0 % / -3  Cervical Dilation (cm): 0 (Per Sujey Salamanca  RN)  Cervical Effacement: 0  Fetal Station: -3   Contractions: none        Extremities: peripheral pulses normal, no pedal edema, no clubbing or cyanosis      LABS AND TESTING ORDERED:  Uterine and fetal monitoring      LAB RESULTS:    No results found for this or any previous visit (from the past 24 hours).    Lab Results   Component Value Date    ABO B 2025    RH Positive 2025       Lab Results   Component Value Date    STREPGPB Negative 2023                 Assessment & Plan     ASSESSMENT/PLAN:  Shruti Pearl is a 29 y.o. female  at 27w3d who presented with:     [place]-Donahue contractions  No contractions on toco in [facility]        PLAN:     Patient had intercourse  last night so FFN not sent  Cervix closed, no contractions on toco.  Patient discharged home with recommendation to increase oral fluids  She was advised to return if she experiences worsening contractions, leaking, bleeding or decreased fetal movement.    Patient has a prenatal appointment with Dr. Kessler on 2/7/25.    I have spent 20 minutes including face to face time with the patient, greater than 50% in discussion of the diagnosis (counseling) and/or coordination of care.     Mone Servin MD  1/28/2025  20:00 [facility]  OB Hospitalist  Phone:  [phone]

## 2025-01-29 NOTE — DISCHARGE INSTRUCTIONS
Discharged to home with instructions to keep next MD appointment, and to return to labor and delivery with contractions that are 5 minutes for 1 hour and are getting stronger, vaginal bleeding (informed that there might be some vaginal spotting after tonight's cervical exam), rupture of membranes, increased pain, decreased fetal movement.

## 2025-02-03 ENCOUNTER — PATIENT OUTREACH (OUTPATIENT)
Dept: LABOR AND DELIVERY | Facility: HOSPITAL | Age: 30
End: 2025-02-03
Payer: COMMERCIAL

## 2025-02-03 NOTE — OUTREACH NOTE
[facility]  Check-In    Current Estimated Gestational Age: 28w2d      Questions/Answers      Flowsheet Row Responses   Best Method for Contacting Cell   Demographics Reviewed Yes   Currently Employed Yes   Able to keep appointments as scheduled Yes   Gender(s) and Name(s) Girl - Lesvia   Baby Active/Feeling Fetal Movemen Yes   How are you presently feeling? Good   Questions regarding prenatal visits or tests to be ordered? No   Resource/Environmental Concerns None   Do you have any questions related to your care experience, your pregnancy, plans for delivery, any concerns, etc? No   Other Education How to find a pediatrician, WIC Benefits            Pediatrician: Dr Derrick BARTHOLOMEW: Anthony  Feeding Plan: breast feeding - has breast pump    Encouraged enrollment in [place]    No current concerns with food, housing or transportation.  Encouraged to reach out for any questions, needs or concerns.      Francia Holloway RN  Maternity Nurse Navigator    2/3/2025, 13:37 EST

## 2025-02-06 PROBLEM — O99.810 ABNORMAL GLUCOSE IN PREGNANCY, ANTEPARTUM: Status: ACTIVE | Noted: 2025-02-06

## 2025-02-07 ENCOUNTER — ROUTINE PRENATAL (OUTPATIENT)
Dept: OBSTETRICS AND GYNECOLOGY | Facility: CLINIC | Age: 30
End: 2025-02-07
Payer: COMMERCIAL

## 2025-02-07 VITALS — DIASTOLIC BLOOD PRESSURE: 73 MMHG | WEIGHT: 228 LBS | BODY MASS INDEX: 39.14 KG/M2 | SYSTOLIC BLOOD PRESSURE: 111 MMHG

## 2025-02-07 DIAGNOSIS — O99.810 ABNORMAL GLUCOSE TOLERANCE IN PREGNANCY: ICD-10-CM

## 2025-02-07 DIAGNOSIS — Z87.59 HISTORY OF PLACENTAL ABRUPTION: ICD-10-CM

## 2025-02-07 DIAGNOSIS — Z28.39 RUBELLA NON-IMMUNE STATUS, ANTEPARTUM: ICD-10-CM

## 2025-02-07 DIAGNOSIS — Z34.80 SUPERVISION OF OTHER NORMAL PREGNANCY, ANTEPARTUM: Primary | ICD-10-CM

## 2025-02-07 DIAGNOSIS — Z98.891 PREVIOUS CESAREAN SECTION: ICD-10-CM

## 2025-02-07 DIAGNOSIS — O09.899 RUBELLA NON-IMMUNE STATUS, ANTEPARTUM: ICD-10-CM

## 2025-02-07 DIAGNOSIS — O09.899 HX OF PRETERM DELIVERY, CURRENTLY PREGNANT: ICD-10-CM

## 2025-02-07 LAB
GLUCOSE UR STRIP-MCNC: NEGATIVE MG/DL
PROT UR STRIP-MCNC: NEGATIVE MG/DL

## 2025-02-07 NOTE — PROGRESS NOTES
Routine Prenatal Visit     Subjective  Shruti Pearl is a 29 y.o.  at 28w6d here for her routine OB visit.   She is taking her prenatal vitamins.Reports no loss of fluid or vaginal bleeding. Patient doing well without any complaints. Pregnancy complicated by:     Patient Active Problem List   Diagnosis    Bipolar II disorder    Supervision of other normal pregnancy, antepartum    Previous  section    Hx of  delivery, currently pregnant    History of placental abruption    Abnormal glucose in pregnancy, antepartum         OB History    Para Term  AB Living   4 2 1 1 1 2   SAB IAB Ectopic Molar Multiple Live Births       1   0 2      # Outcome Date GA Lbr Olegario/2nd Weight Sex Type Anes PTL Lv   4 Current            3 Term 23 38w4d  3650 g (8 lb 0.8 oz) M CS-LTranv Spinal N CINDY   2 Ectopic 2018 8w0d    ECTOPIC      1  14 35w5d  2929 g (6 lb 7.3 oz) M CS-LTranv Spinal Y CINDY           ROS:   Review of Systems - General ROS: negative  Psychological ROS: negative  Hematological and Lymphatic ROS: negative  Endocrine ROS: negative  Breast ROS: negative  Respiratory ROS: negative  Cardiovascular ROS: negative  Gastrointestinal ROS: negative  Genito-Urinary ROS: negative  Musculoskeletal ROS: negative  Neurological ROS: negative  Dermatological ROS: negative    Objective  Physical Exam:   Vitals:    25 0958   BP: 111/73       Uterine Size: size equals dates  FHT: 110-160 BPM    General appearance - alert, well appearing, and in no distress  Mental status - alert, oriented to person, place, and time  Abdomen- Soft, Gravid uterus, non-tender to palpation  Musculoskeletal: negative for - gait disturbance or joint pain  Extremeties: negative swelling or cyanosis   Dermatological: negative rashes or skin lesions       Assessment/Plan:   Diagnoses and all orders for this visit:    1. Supervision of other normal pregnancy, antepartum (Primary)  -     POC Urinalysis  Dipstick    2. Hx of  delivery, currently pregnant    3. Rubella non-immune status, antepartum    4. Previous  section    5. History of placental abruption    6. Abnormal glucose tolerance in pregnancy  -     Gestational, Glucose Tolerance, 3 Hour; Future            Counseling:   OB precautions, leaking, VB, radha acosta vs PTL/Labor  [facility]  HTN precautions reviewed: HA, vision change, RUQ/epigastric pain, edema  CS scheduled  CS reviewed in detail.  All history reviewed and updated.  Preop exam performed.  See separate scanned in counseling note.  All questions answered.  She desires to proceed as planned.   Round Ligament Pain:  The uterus has several ligaments which provide support and keep the uterus in place. As the  uterus grows these ligaments are pulled and stretched which often causes sharp stabbing like pain in the inguinal area.   You may find a pregnancy support band helpful. Changing positions may also help. Yoga is a great way to cope with round ligament and low back pain in pregnancy.    Massage may also help with low back pain   Things to Consider at this Point in your Pregnancy:  Some women experience swelling in their feet during pregnancy. Compression stockings may help  Drink plenty of water and stay active   Make sure you are eating frequent small meals, nuts are a wonderful snack to keep with you            Return in about 2 weeks (around 2025) for Routine OB visit.      We have gone over prenatal care to include the timing and content of visits. I informed her how to contact the office and/or on call person in the event of any problems and encouraged her to do so when she feels it is necessary.  We then spent time answering her questions which she indicated were answered to her satisfaction.    Leslie Kessler DO  2025 10:23 EST

## 2025-02-24 ENCOUNTER — TELEPHONE (OUTPATIENT)
Dept: OBSTETRICS AND GYNECOLOGY | Facility: CLINIC | Age: 30
End: 2025-02-24

## 2025-02-24 NOTE — TELEPHONE ENCOUNTER
"    Caller: Shruti Pearl \"Shruti\"  Female, 30 y.o., 1995   MRN: [id number]  CSN: [id number]  Phone: [phone]    Relationship to patient: SELF        Type of visit: 3 HOUR GLU TEST        Additional notes:PT CALLED TO SEE THE LOCATION THAT SHE IS TO GO TO FOR HER 3 HOUR GLUCOSE TEST TODAY, UNABLE TO FIND LOCATION IN THE ORDER, PT REQ A CALL BACK TO BE ADVISED           "

## 2025-02-25 ENCOUNTER — ROUTINE PRENATAL (OUTPATIENT)
Dept: OBSTETRICS AND GYNECOLOGY | Facility: CLINIC | Age: 30
End: 2025-02-25
Payer: COMMERCIAL

## 2025-02-25 VITALS — WEIGHT: 231 LBS | DIASTOLIC BLOOD PRESSURE: 70 MMHG | SYSTOLIC BLOOD PRESSURE: 108 MMHG | BODY MASS INDEX: 39.65 KG/M2

## 2025-02-25 DIAGNOSIS — Z87.59 HISTORY OF PLACENTAL ABRUPTION: ICD-10-CM

## 2025-02-25 DIAGNOSIS — O09.899 RUBELLA NON-IMMUNE STATUS, ANTEPARTUM: ICD-10-CM

## 2025-02-25 DIAGNOSIS — R30.0 DYSURIA: ICD-10-CM

## 2025-02-25 DIAGNOSIS — Z28.39 RUBELLA NON-IMMUNE STATUS, ANTEPARTUM: ICD-10-CM

## 2025-02-25 DIAGNOSIS — Z3A.31 31 WEEKS GESTATION OF PREGNANCY: Primary | ICD-10-CM

## 2025-02-25 DIAGNOSIS — O09.899 HX OF PRETERM DELIVERY, CURRENTLY PREGNANT: ICD-10-CM

## 2025-02-25 DIAGNOSIS — Z98.891 PREVIOUS CESAREAN SECTION: ICD-10-CM

## 2025-02-25 LAB
GLUCOSE UR STRIP-MCNC: NEGATIVE MG/DL
PROT UR STRIP-MCNC: NEGATIVE MG/DL

## 2025-02-25 PROCEDURE — 87086 URINE CULTURE/COLONY COUNT: CPT | Performed by: OBSTETRICS & GYNECOLOGY

## 2025-02-25 NOTE — PROGRESS NOTES
Routine Prenatal Visit     Subjective  Shruti Pearl is a 30 y.o.  at 31w3d here for her routine OB visit.   She is taking her prenatal vitamins.Reports no loss of fluid or vaginal bleeding. Patient doing well without any complaints. Pregnancy complicated by:     Patient Active Problem List   Diagnosis    Bipolar II disorder    Supervision of other normal pregnancy, antepartum    Previous  section    Hx of  delivery, currently pregnant    History of placental abruption    Abnormal glucose in pregnancy, antepartum         OB History    Para Term  AB Living   4 2 1 1 1 2   SAB IAB Ectopic Molar Multiple Live Births       1   0 2      # Outcome Date GA Lbr Olegario/2nd Weight Sex Type Anes PTL Lv   4 Current            3 Term 23 38w4d  3650 g (8 lb 0.8 oz) M CS-LTranv Spinal N CINDY   2 Ectopic 2018 8w0d    ECTOPIC      1  14 35w5d  2929 g (6 lb 7.3 oz) M CS-LTranv Spinal Y CINDY           ROS:   Review of Systems - General ROS: negative  Psychological ROS: negative  Endocrine ROS: negative  Breast ROS: negative  Respiratory ROS: negative  Cardiovascular ROS: negative  Gastrointestinal ROS: negative  Genito-Urinary ROS: negative  Musculoskeletal ROS: negative  Neurological ROS: negative  Dermatological ROS: negative    Objective  Physical Exam:   Vitals:    25 0914   BP: 108/70       Uterine Size: size equals dates  FHT: 110-160 BPM    General appearance - alert, well appearing, and in no distress  Mental status - alert, oriented to person, place, and time  Abdomen- Soft, Gravid uterus, non-tender to palpation  Musculoskeletal: negative for - gait disturbance or joint pain  Extremeties: negative swelling or cyanosis   Dermatological: negative rashes or skin lesions       Assessment/Plan:   Diagnoses and all orders for this visit:    1. 31 weeks gestation of pregnancy (Primary)  -     POC Urinalysis Dipstick    2. Hx of  delivery, currently pregnant    3.  Rubella non-immune status, antepartum  Comments:  MMR PP    4. Previous  section  Assessment & Plan:  Delivered first baby at 35w5d due to abruption         5. History of placental abruption    6. Dysuria  -     Urine Culture - Urine, Urine, Clean Catch            Counseling:   OB precautions, leaking, VB, radha acosta vs PTL/Labor  [facility]  HTN precautions reviewed: HA, vision change, RUQ/epigastric pain, edema  CS scheduled  CS reviewed in detail.  All history reviewed and updated.  Preop exam performed.  See separate scanned in counseling note.  All questions answered.  She desires to proceed as planned.   Round Ligament Pain:  The uterus has several ligaments which provide support and keep the uterus in place. As the  uterus grows these ligaments are pulled and stretched which often causes sharp stabbing like pain in the inguinal area.   You may find a pregnancy support band helpful. Changing positions may also help. Yoga is a great way to cope with round ligament and low back pain in pregnancy.    Massage may also help with low back pain   Things to Consider at this Point in your Pregnancy:  Some women experience swelling in their feet during pregnancy. Compression stockings may help  Drink plenty of water and stay active   Make sure you are eating frequent small meals, nuts are a wonderful snack to keep with you            Return in about 2 weeks (around 3/11/2025) for Routine OB visit.      We have gone over prenatal care to include the timing and content of visits. I informed her how to contact the office and/or on call person in the event of any problems and encouraged her to do so when she feels it is necessary.  We then spent time answering her questions which she indicated were answered to her satisfaction.    Leslie Kessler DO  2025 09:28 EST

## 2025-02-26 LAB — BACTERIA SPEC AEROBE CULT: NORMAL

## 2025-03-10 NOTE — PROGRESS NOTES
Routine Prenatal Visit     Subjective  Shruti Pearl is a 30 y.o.  at 33w3d here for her routine OB visit.   She is taking her prenatal vitamins.Reports no loss of fluid or vaginal bleeding. Patient doing well. Admits to some shortness of breath and fatigue.     Pregnancy complicated by:     Patient Active Problem List   Diagnosis    Bipolar II disorder    Supervision of other normal pregnancy, antepartum    Previous  section    Hx of  delivery, currently pregnant    History of placental abruption    Abnormal glucose in pregnancy, antepartum    HSV infection         OB History    Para Term  AB Living   4 2 1 1 1 2   SAB IAB Ectopic Molar Multiple Live Births     1  0 2      # Outcome Date GA Lbr Olegario/2nd Weight Sex Type Anes PTL Lv   4 Current            3 Term 23 38w4d  3650 g (8 lb 0.8 oz) M CS-LTranv Spinal N CINDY   2 Ectopic 2018 8w0d    ECTOPIC      1  14 35w5d  2929 g (6 lb 7.3 oz) M CS-LTranv Spinal Y CINDY           ROS:  General ROS: negative for - chills or fatigue  Genito-Urinary ROS: negative for  change in urinary stream, vaginal discharge     Objective  Physical Exam:   Vitals:    25 1107   BP: 128/83       Uterine Size: size equals dates  FHT: 110-160 BPM         Assessment/Plan:   Diagnoses and all orders for this visit:    1. Supervision of other normal pregnancy, antepartum (Primary)  Assessment & Plan:  PROBLEM LIST/PLAN:   Hx of  labor/delivery:   labor at 31 and 33 weeks with first pregnancy, delivered at 35w5d due to abruption  Previous  - Recommend repeat, desires sterilization   Obesity-BMI >35- testing 37 weeks   Bipolar 2-NO meds, stable   Short interval pregnancy   HSV- suppression rx given 25    Orders:  -     POC Urinalysis Dipstick    2. Pregnancy related fatigue in third trimester  -     CBC (No Diff)  -     Iron Profile    3. HSV infection  -     valACYclovir (Valtrex) 500 MG tablet;  Take 1 tablet by mouth 2 (Two) Times a Day for 30 days.  Dispense: 60 tablet; Refill: 1    4. Previous  section    5. Hx of  delivery, currently pregnant    Other orders  -     Discontinue: valACYclovir (VALTREX) 500 MG tablet; Take daily to prevent cold sores for up to 4months  Dispense: 30 tablet; Refill: 3            Counseling:   OB precautions, leaking, VB, radha acosta vs PTL/Labor  [facility]  Round Ligament Pain:  The uterus has several ligaments which provide support and keep the uterus in place. As the  uterus grows these ligaments are pulled and stretched which often causes sharp stabbing like pain in the inguinal area.   You may find a pregnancy support band helpful. Changing positions may also help. Yoga is a great way to cope with round ligament and low back pain in pregnancy.    Massage may also help with low back pain   Things to Consider at this Point in your Pregnancy:  Some women experience swelling in their feet during pregnancy. Compression stockings may help  Drink plenty of water and stay active   Make sure you are eating frequent small meals, nuts are a wonderful snack to keep with you            Return in about 2 weeks (around 3/25/2025) for Routine OB visit.      We have gone over prenatal care to include the timing and content of visits. I informed her how to contact the office and/or on call person in the event of any problems and encouraged her to do so when she feels it is necessary.  We then spent time answering her questions which she indicated were answered to her satisfaction.    Marine Peterson,   3/11/2025 12:42 EDT

## 2025-03-11 ENCOUNTER — ROUTINE PRENATAL (OUTPATIENT)
Dept: OBSTETRICS AND GYNECOLOGY | Facility: CLINIC | Age: 30
End: 2025-03-11
Payer: COMMERCIAL

## 2025-03-11 VITALS — WEIGHT: 228 LBS | BODY MASS INDEX: 39.14 KG/M2 | SYSTOLIC BLOOD PRESSURE: 128 MMHG | DIASTOLIC BLOOD PRESSURE: 83 MMHG

## 2025-03-11 DIAGNOSIS — B00.9 HSV INFECTION: ICD-10-CM

## 2025-03-11 DIAGNOSIS — O26.813 PREGNANCY RELATED FATIGUE IN THIRD TRIMESTER: ICD-10-CM

## 2025-03-11 DIAGNOSIS — Z34.80 SUPERVISION OF OTHER NORMAL PREGNANCY, ANTEPARTUM: Primary | ICD-10-CM

## 2025-03-11 DIAGNOSIS — O09.899 HX OF PRETERM DELIVERY, CURRENTLY PREGNANT: ICD-10-CM

## 2025-03-11 DIAGNOSIS — Z98.891 PREVIOUS CESAREAN SECTION: ICD-10-CM

## 2025-03-11 LAB
DEPRECATED RDW RBC AUTO: 39 FL (ref 37–54)
ERYTHROCYTE [DISTWIDTH] IN BLOOD BY AUTOMATED COUNT: 14.9 % (ref 12.3–15.4)
GLUCOSE UR STRIP-MCNC: NEGATIVE MG/DL
HCT VFR BLD AUTO: 31.8 % (ref 34–46.6)
HGB BLD-MCNC: 9.9 G/DL (ref 12–15.9)
IRON 24H UR-MRATE: 33 MCG/DL (ref 37–145)
IRON SATN MFR SERPL: 5 % (ref 20–50)
MCH RBC QN AUTO: 22.4 PG (ref 26.6–33)
MCHC RBC AUTO-ENTMCNC: 31.1 G/DL (ref 31.5–35.7)
MCV RBC AUTO: 72.1 FL (ref 79–97)
PLATELET # BLD AUTO: 211 10*3/MM3 (ref 140–450)
PMV BLD AUTO: 10.3 FL (ref 6–12)
PROT UR STRIP-MCNC: NEGATIVE MG/DL
RBC # BLD AUTO: 4.41 10*6/MM3 (ref 3.77–5.28)
TIBC SERPL-MCNC: 687 MCG/DL (ref 298–536)
TRANSFERRIN SERPL-MCNC: 461 MG/DL (ref 200–360)
WBC NRBC COR # BLD AUTO: 9.57 10*3/MM3 (ref 3.4–10.8)

## 2025-03-11 PROCEDURE — 84466 ASSAY OF TRANSFERRIN: CPT | Performed by: STUDENT IN AN ORGANIZED HEALTH CARE EDUCATION/TRAINING PROGRAM

## 2025-03-11 PROCEDURE — 83540 ASSAY OF IRON: CPT | Performed by: STUDENT IN AN ORGANIZED HEALTH CARE EDUCATION/TRAINING PROGRAM

## 2025-03-11 PROCEDURE — 85027 COMPLETE CBC AUTOMATED: CPT | Performed by: STUDENT IN AN ORGANIZED HEALTH CARE EDUCATION/TRAINING PROGRAM

## 2025-03-11 RX ORDER — VALACYCLOVIR HYDROCHLORIDE 500 MG/1
500 TABLET, FILM COATED ORAL 2 TIMES DAILY
Qty: 60 TABLET | Refills: 1 | Status: SHIPPED | OUTPATIENT
Start: 2025-03-11 | End: 2025-04-10

## 2025-03-11 RX ORDER — VALACYCLOVIR HYDROCHLORIDE 500 MG/1
TABLET, FILM COATED ORAL
Qty: 30 TABLET | Refills: 3 | Status: SHIPPED | OUTPATIENT
Start: 2025-03-11 | End: 2025-03-11

## 2025-03-11 NOTE — ASSESSMENT & PLAN NOTE
PROBLEM LIST/PLAN:   Hx of  labor/delivery:   labor at 31 and 33 weeks with first pregnancy, delivered at 35w5d due to abruption  Previous  - Recommend repeat, desires sterilization   Obesity-BMI >35- testing 37 weeks   Bipolar 2-NO meds, stable   Short interval pregnancy   HSV- suppression rx given 25

## 2025-03-12 DIAGNOSIS — O99.019 MATERNAL ANEMIA IN PREGNANCY, ANTEPARTUM: Primary | ICD-10-CM

## 2025-03-12 RX ORDER — SODIUM CHLORIDE 9 MG/ML
20 INJECTION, SOLUTION INTRAVENOUS ONCE
OUTPATIENT
Start: 2025-03-17

## 2025-03-12 RX ORDER — FAMOTIDINE 10 MG/ML
20 INJECTION, SOLUTION INTRAVENOUS AS NEEDED
OUTPATIENT
Start: 2025-03-17

## 2025-03-12 RX ORDER — HYDROCORTISONE SODIUM SUCCINATE 100 MG/2ML
100 INJECTION INTRAMUSCULAR; INTRAVENOUS AS NEEDED
OUTPATIENT
Start: 2025-03-17

## 2025-03-12 RX ORDER — DIPHENHYDRAMINE HYDROCHLORIDE 50 MG/ML
50 INJECTION INTRAMUSCULAR; INTRAVENOUS AS NEEDED
OUTPATIENT
Start: 2025-03-17

## 2025-03-25 ENCOUNTER — HOSPITAL ENCOUNTER (OUTPATIENT)
Dept: INFUSION THERAPY | Facility: HOSPITAL | Age: 30
Discharge: HOME | End: 2025-03-25
Admitting: STUDENT IN AN ORGANIZED HEALTH CARE EDUCATION/TRAINING PROGRAM
Payer: COMMERCIAL

## 2025-03-25 VITALS
SYSTOLIC BLOOD PRESSURE: 106 MMHG | TEMPERATURE: 97.4 F | BODY MASS INDEX: 39.54 KG/M2 | HEART RATE: 72 BPM | RESPIRATION RATE: 16 BRPM | OXYGEN SATURATION: 100 % | DIASTOLIC BLOOD PRESSURE: 60 MMHG | WEIGHT: 230.38 LBS

## 2025-03-25 DIAGNOSIS — O99.019 MATERNAL ANEMIA IN PREGNANCY, ANTEPARTUM: Primary | ICD-10-CM

## 2025-03-25 PROCEDURE — 96374 THER/PROPH/DIAG INJ IV PUSH: CPT

## 2025-03-25 PROCEDURE — 25010000002 IRON SUCROSE PER 1 MG: Performed by: STUDENT IN AN ORGANIZED HEALTH CARE EDUCATION/TRAINING PROGRAM

## 2025-03-25 RX ORDER — SODIUM CHLORIDE 9 MG/ML
20 INJECTION, SOLUTION INTRAVENOUS ONCE
Status: CANCELLED | OUTPATIENT
Start: 2025-03-26

## 2025-03-25 RX ORDER — SODIUM CHLORIDE 9 MG/ML
20 INJECTION, SOLUTION INTRAVENOUS ONCE
Status: DISCONTINUED | OUTPATIENT
Start: 2025-03-25 | End: 2025-03-27 | Stop reason: HOSPADM

## 2025-03-25 RX ORDER — FAMOTIDINE 10 MG/ML
20 INJECTION, SOLUTION INTRAVENOUS AS NEEDED
Status: CANCELLED | OUTPATIENT
Start: 2025-03-26

## 2025-03-25 RX ORDER — HYDROCORTISONE SODIUM SUCCINATE 100 MG/2ML
100 INJECTION INTRAMUSCULAR; INTRAVENOUS AS NEEDED
Status: CANCELLED | OUTPATIENT
Start: 2025-03-26

## 2025-03-25 RX ORDER — DIPHENHYDRAMINE HYDROCHLORIDE 50 MG/ML
50 INJECTION, SOLUTION INTRAMUSCULAR; INTRAVENOUS AS NEEDED
Status: CANCELLED | OUTPATIENT
Start: 2025-03-26

## 2025-03-25 RX ADMIN — IRON SUCROSE 200 MG: 20 INJECTION, SOLUTION INTRAVENOUS at 15:14

## 2025-03-26 ENCOUNTER — HOSPITAL ENCOUNTER (OUTPATIENT)
Dept: INFUSION THERAPY | Facility: HOSPITAL | Age: 30
Discharge: HOME | End: 2025-03-26
Payer: COMMERCIAL

## 2025-03-26 VITALS
RESPIRATION RATE: 18 BRPM | DIASTOLIC BLOOD PRESSURE: 68 MMHG | OXYGEN SATURATION: 100 % | HEART RATE: 98 BPM | SYSTOLIC BLOOD PRESSURE: 105 MMHG

## 2025-03-26 DIAGNOSIS — O99.019 MATERNAL ANEMIA IN PREGNANCY, ANTEPARTUM: Primary | ICD-10-CM

## 2025-03-26 PROCEDURE — 25010000002 IRON SUCROSE PER 1 MG: Performed by: STUDENT IN AN ORGANIZED HEALTH CARE EDUCATION/TRAINING PROGRAM

## 2025-03-26 PROCEDURE — 96374 THER/PROPH/DIAG INJ IV PUSH: CPT

## 2025-03-26 RX ORDER — FAMOTIDINE 10 MG/ML
20 INJECTION, SOLUTION INTRAVENOUS AS NEEDED
Status: CANCELLED | OUTPATIENT
Start: 2025-03-27

## 2025-03-26 RX ORDER — HYDROCORTISONE SODIUM SUCCINATE 100 MG/2ML
100 INJECTION INTRAMUSCULAR; INTRAVENOUS AS NEEDED
Status: CANCELLED | OUTPATIENT
Start: 2025-03-27

## 2025-03-26 RX ORDER — SODIUM CHLORIDE 9 MG/ML
20 INJECTION, SOLUTION INTRAVENOUS ONCE
Status: DISCONTINUED | OUTPATIENT
Start: 2025-03-26 | End: 2025-03-29 | Stop reason: HOSPADM

## 2025-03-26 RX ORDER — SODIUM CHLORIDE 9 MG/ML
20 INJECTION, SOLUTION INTRAVENOUS ONCE
Status: CANCELLED | OUTPATIENT
Start: 2025-03-27

## 2025-03-26 RX ORDER — DIPHENHYDRAMINE HYDROCHLORIDE 50 MG/ML
50 INJECTION, SOLUTION INTRAMUSCULAR; INTRAVENOUS AS NEEDED
Status: CANCELLED | OUTPATIENT
Start: 2025-03-27

## 2025-03-26 RX ADMIN — IRON SUCROSE 200 MG: 20 INJECTION, SOLUTION INTRAVENOUS at 07:35

## 2025-03-27 ENCOUNTER — HOSPITAL ENCOUNTER (OUTPATIENT)
Dept: INFUSION THERAPY | Facility: HOSPITAL | Age: 30
Discharge: HOME | End: 2025-03-27
Payer: COMMERCIAL

## 2025-03-27 ENCOUNTER — ROUTINE PRENATAL (OUTPATIENT)
Dept: OBSTETRICS AND GYNECOLOGY | Facility: CLINIC | Age: 30
End: 2025-03-27
Payer: COMMERCIAL

## 2025-03-27 VITALS — WEIGHT: 230 LBS | SYSTOLIC BLOOD PRESSURE: 110 MMHG | DIASTOLIC BLOOD PRESSURE: 86 MMHG | BODY MASS INDEX: 39.48 KG/M2

## 2025-03-27 VITALS
SYSTOLIC BLOOD PRESSURE: 103 MMHG | TEMPERATURE: 97.7 F | RESPIRATION RATE: 18 BRPM | HEART RATE: 78 BPM | OXYGEN SATURATION: 100 % | DIASTOLIC BLOOD PRESSURE: 66 MMHG

## 2025-03-27 DIAGNOSIS — O99.019 MATERNAL ANEMIA IN PREGNANCY, ANTEPARTUM: Primary | ICD-10-CM

## 2025-03-27 DIAGNOSIS — O09.899 RUBELLA NON-IMMUNE STATUS, ANTEPARTUM: ICD-10-CM

## 2025-03-27 DIAGNOSIS — Z98.891 PREVIOUS CESAREAN SECTION: ICD-10-CM

## 2025-03-27 DIAGNOSIS — Z87.59 HISTORY OF PLACENTAL ABRUPTION: ICD-10-CM

## 2025-03-27 DIAGNOSIS — Z3A.35 35 WEEKS GESTATION OF PREGNANCY: Primary | ICD-10-CM

## 2025-03-27 DIAGNOSIS — Z28.39 RUBELLA NON-IMMUNE STATUS, ANTEPARTUM: ICD-10-CM

## 2025-03-27 DIAGNOSIS — O09.899 HX OF PRETERM DELIVERY, CURRENTLY PREGNANT: ICD-10-CM

## 2025-03-27 LAB
GLUCOSE UR STRIP-MCNC: NEGATIVE MG/DL
PROT UR STRIP-MCNC: ABNORMAL MG/DL

## 2025-03-27 PROCEDURE — 25010000002 IRON SUCROSE PER 1 MG: Performed by: STUDENT IN AN ORGANIZED HEALTH CARE EDUCATION/TRAINING PROGRAM

## 2025-03-27 PROCEDURE — 96374 THER/PROPH/DIAG INJ IV PUSH: CPT

## 2025-03-27 PROCEDURE — 87653 STREP B DNA AMP PROBE: CPT | Performed by: OBSTETRICS & GYNECOLOGY

## 2025-03-27 RX ORDER — DIPHENHYDRAMINE HYDROCHLORIDE 50 MG/ML
50 INJECTION, SOLUTION INTRAMUSCULAR; INTRAVENOUS AS NEEDED
OUTPATIENT
Start: 2025-03-28

## 2025-03-27 RX ORDER — SODIUM CHLORIDE 9 MG/ML
20 INJECTION, SOLUTION INTRAVENOUS ONCE
OUTPATIENT
Start: 2025-03-28

## 2025-03-27 RX ORDER — HYDROCORTISONE SODIUM SUCCINATE 100 MG/2ML
100 INJECTION INTRAMUSCULAR; INTRAVENOUS AS NEEDED
OUTPATIENT
Start: 2025-03-28

## 2025-03-27 RX ORDER — FAMOTIDINE 10 MG/ML
20 INJECTION, SOLUTION INTRAVENOUS AS NEEDED
OUTPATIENT
Start: 2025-03-28

## 2025-03-27 RX ADMIN — IRON SUCROSE 200 MG: 20 INJECTION, SOLUTION INTRAVENOUS at 07:56

## 2025-03-27 NOTE — PROGRESS NOTES
Routine Prenatal Visit     Subjective  Shruti Pearl is a 30 y.o.  at 35w6d here for her routine OB visit.   She is taking her prenatal vitamins.Reports no loss of fluid or vaginal bleeding. Patient doing well without any complaints. Pregnancy complicated by:     Patient Active Problem List   Diagnosis    Bipolar II disorder    Supervision of other normal pregnancy, antepartum    Previous  section    Hx of  delivery, currently pregnant    History of placental abruption    Abnormal glucose in pregnancy, antepartum    HSV infection    Maternal anemia in pregnancy, antepartum         OB History    Para Term  AB Living   4 2 1 1 1 2   SAB IAB Ectopic Molar Multiple Live Births     1  0 2      # Outcome Date GA Lbr Olegario/2nd Weight Sex Type Anes PTL Lv   4 Current            3 Term 23 38w4d  3650 g (8 lb 0.8 oz) M CS-LTranv Spinal N CINDY   2 Ectopic 2018 8w0d    ECTOPIC      1  14 35w5d  2929 g (6 lb 7.3 oz) M CS-LTranv Spinal Y CINDY           ROS:   Review of Systems - General ROS: negative  Psychological ROS: negative  Endocrine ROS: negative  Breast ROS: negative  Respiratory ROS: negative  Cardiovascular ROS: negative  Gastrointestinal ROS: negative  Genito-Urinary ROS: negative  Musculoskeletal ROS: negative  Neurological ROS: negative  Dermatological ROS: negative    Objective  Physical Exam:   Vitals:    25 0844   BP: 110/86       Uterine Size: size equals dates  FHT: 110-160 BPM    General appearance - alert, well appearing, and in no distress  Mental status - alert, oriented to person, place, and time  Abdomen- Soft, Gravid uterus, non-tender to palpation  Musculoskeletal: negative for - gait disturbance or joint pain  Extremeties: negative swelling or cyanosis   Dermatological: negative rashes or skin lesions   GBS RV swab performed today    Assessment/Plan:   Diagnoses and all orders for this visit:    1. 35 weeks gestation of pregnancy (Primary)  -      Cancel: CBC (No Diff)  -     Group B Strep Molecular by PCR - Swab, Vaginal/Rectum  -     POC Urinalysis Dipstick    2. Hx of  delivery, currently pregnant    3. Rubella non-immune status, antepartum  Comments:  MMR PP    4. Previous  section  Comments:  RLTCS with BS scheduled 25    5. History of placental abruption            Counseling:   OB precautions, leaking, VB, radha acosta vs PTL/Labor  [facility]  HTN precautions reviewed: HA, vision change, RUQ/epigastric pain, edema  CS scheduled  Round Ligament Pain:  The uterus has several ligaments which provide support and keep the uterus in place. As the  uterus grows these ligaments are pulled and stretched which often causes sharp stabbing like pain in the inguinal area.   You may find a pregnancy support band helpful. Changing positions may also help. Yoga is a great way to cope with round ligament and low back pain in pregnancy.    Massage may also help with low back pain   Things to Consider at this Point in your Pregnancy:  Some women experience swelling in their feet during pregnancy. Compression stockings may help  Drink plenty of water and stay active   Make sure you are eating frequent small meals, nuts are a wonderful snack to keep with you            Return in about 1 week (around 4/3/2025) for Routine OB visit.      We have gone over prenatal care to include the timing and content of visits. I informed her how to contact the office and/or on call person in the event of any problems and encouraged her to do so when she feels it is necessary.  We then spent time answering her questions which she indicated were answered to her satisfaction.    Leslie Kessler DO  3/27/2025 08:55 EDT

## 2025-03-28 ENCOUNTER — HOSPITAL ENCOUNTER (OUTPATIENT)
Dept: INFUSION THERAPY | Facility: HOSPITAL | Age: 30
End: 2025-03-28
Payer: COMMERCIAL

## 2025-03-28 LAB — GP B STREP DNA VAG+RECTUM QL NAA+PROBE: NEGATIVE

## 2025-03-31 ENCOUNTER — TELEPHONE (OUTPATIENT)
Dept: OBSTETRICS AND GYNECOLOGY | Age: 30
End: 2025-03-31

## 2025-03-31 ENCOUNTER — HOSPITAL ENCOUNTER (OUTPATIENT)
Dept: INFUSION THERAPY | Facility: HOSPITAL | Age: 30
Discharge: HOME | End: 2025-03-31
Admitting: STUDENT IN AN ORGANIZED HEALTH CARE EDUCATION/TRAINING PROGRAM
Payer: COMMERCIAL

## 2025-03-31 VITALS
BODY MASS INDEX: 39.78 KG/M2 | DIASTOLIC BLOOD PRESSURE: 62 MMHG | OXYGEN SATURATION: 100 % | SYSTOLIC BLOOD PRESSURE: 98 MMHG | TEMPERATURE: 97.7 F | HEART RATE: 107 BPM | HEIGHT: 64 IN | WEIGHT: 233.03 LBS | RESPIRATION RATE: 20 BRPM

## 2025-03-31 DIAGNOSIS — O99.019 MATERNAL ANEMIA IN PREGNANCY, ANTEPARTUM: Primary | ICD-10-CM

## 2025-03-31 PROCEDURE — 96374 THER/PROPH/DIAG INJ IV PUSH: CPT

## 2025-03-31 PROCEDURE — 25010000002 IRON SUCROSE PER 1 MG: Performed by: STUDENT IN AN ORGANIZED HEALTH CARE EDUCATION/TRAINING PROGRAM

## 2025-03-31 RX ORDER — HYDROCORTISONE SODIUM SUCCINATE 100 MG/2ML
100 INJECTION INTRAMUSCULAR; INTRAVENOUS AS NEEDED
OUTPATIENT
Start: 2025-04-01

## 2025-03-31 RX ORDER — SODIUM CHLORIDE 9 MG/ML
20 INJECTION, SOLUTION INTRAVENOUS ONCE
OUTPATIENT
Start: 2025-04-01

## 2025-03-31 RX ORDER — DIPHENHYDRAMINE HYDROCHLORIDE 50 MG/ML
50 INJECTION, SOLUTION INTRAMUSCULAR; INTRAVENOUS AS NEEDED
OUTPATIENT
Start: 2025-04-01

## 2025-03-31 RX ORDER — FAMOTIDINE 10 MG/ML
20 INJECTION, SOLUTION INTRAVENOUS AS NEEDED
OUTPATIENT
Start: 2025-04-01

## 2025-03-31 RX ADMIN — IRON SUCROSE 200 MG: 20 INJECTION, SOLUTION INTRAVENOUS at 14:11

## 2025-03-31 NOTE — TELEPHONE ENCOUNTER
Caller: Shruti Pearl    Relationship: Self    Best call back number: [phone]    What is the best time to reach you: ANY    Who are you requesting to speak with (clinical staff, provider,  specific staff member):        What was the call regarding: PT STATES SHE IS SUPPOSED TO HAVE OB FOLLOW-UP APPT THIS WEEK; NO ONE HAS CALLED TO SCHEDULE. PT STATES SHE WOULD PREFER 4/1 OR 4/3    Is it okay if the provider responds through MyChart: YES

## 2025-04-04 ENCOUNTER — ROUTINE PRENATAL (OUTPATIENT)
Dept: OBSTETRICS AND GYNECOLOGY | Age: 30
End: 2025-04-04
Payer: COMMERCIAL

## 2025-04-04 VITALS — BODY MASS INDEX: 39.65 KG/M2 | DIASTOLIC BLOOD PRESSURE: 68 MMHG | WEIGHT: 231 LBS | SYSTOLIC BLOOD PRESSURE: 97 MMHG

## 2025-04-04 DIAGNOSIS — O99.019 MATERNAL ANEMIA IN PREGNANCY, ANTEPARTUM: ICD-10-CM

## 2025-04-04 DIAGNOSIS — O09.899 RUBELLA NON-IMMUNE STATUS, ANTEPARTUM: ICD-10-CM

## 2025-04-04 DIAGNOSIS — Z87.59 HISTORY OF PLACENTAL ABRUPTION: ICD-10-CM

## 2025-04-04 DIAGNOSIS — O09.899 HX OF PRETERM DELIVERY, CURRENTLY PREGNANT: ICD-10-CM

## 2025-04-04 DIAGNOSIS — Z28.39 RUBELLA NON-IMMUNE STATUS, ANTEPARTUM: ICD-10-CM

## 2025-04-04 DIAGNOSIS — Z34.80 SUPERVISION OF OTHER NORMAL PREGNANCY, ANTEPARTUM: Primary | ICD-10-CM

## 2025-04-04 DIAGNOSIS — Z98.891 PREVIOUS CESAREAN SECTION: ICD-10-CM

## 2025-04-04 LAB
GLUCOSE UR STRIP-MCNC: NEGATIVE MG/DL
PROT UR STRIP-MCNC: NEGATIVE MG/DL

## 2025-04-04 RX ORDER — OMEPRAZOLE 20 MG/1
20 CAPSULE, DELAYED RELEASE ORAL DAILY
COMMUNITY

## 2025-04-04 NOTE — PROGRESS NOTES
Routine Prenatal Visit     Subjective  Shruti Pearl is a 30 y.o.  at 36w6d here for her routine OB visit.   She is taking her prenatal vitamins.Reports no loss of fluid or vaginal bleeding. Patient doing well without any complaints. Pregnancy complicated by:     Patient Active Problem List   Diagnosis    Bipolar II disorder    Supervision of other normal pregnancy, antepartum    Previous  section    Hx of  delivery, currently pregnant    History of placental abruption    Abnormal glucose in pregnancy, antepartum    HSV infection    Maternal anemia in pregnancy, antepartum         OB History    Para Term  AB Living   4 2 1 1 1 2   SAB IAB Ectopic Molar Multiple Live Births     1  0 2      # Outcome Date GA Lbr Olegario/2nd Weight Sex Type Anes PTL Lv   4 Current            3 Term 23 38w4d  3650 g (8 lb 0.8 oz) M CS-LTranv Spinal N CINDY   2 Ectopic 2018 8w0d    ECTOPIC      1  14 35w5d  2929 g (6 lb 7.3 oz) M CS-LTranv Spinal Y CINDY           ROS:   Review of Systems - General ROS: negative  Psychological ROS: negative  Endocrine ROS: negative  Breast ROS: negative  Respiratory ROS: negative  Cardiovascular ROS: negative  Gastrointestinal ROS: negative  Genito-Urinary ROS: negative  Musculoskeletal ROS: negative  Neurological ROS: negative  Dermatological ROS: negative    Objective  Physical Exam:   Vitals:    25 1419   BP: 97/68       Uterine Size: size equals dates  FHT: 110-160 BPM    General appearance - alert, well appearing, and in no distress  Mental status - alert, oriented to person, place, and time  Abdomen- Soft, Gravid uterus, non-tender to palpation  Musculoskeletal: negative for - gait disturbance or joint pain  Extremeties: negative swelling or cyanosis   Dermatological: negative rashes or skin lesions       Assessment/Plan:   Diagnoses and all orders for this visit:    1. Supervision of other normal pregnancy, antepartum (Primary)  -     POC  Urinalysis Dipstick    2. Hx of  delivery, currently pregnant    3. Rubella non-immune status, antepartum    4. Previous  section    5. History of placental abruption    6. Maternal anemia in pregnancy, antepartum  -     CBC (No Diff); Future            Counseling:   OB precautions, leaking, VB, radha acosta vs PTL/Labor  [facility]  HTN precautions reviewed: HA, vision change, RUQ/epigastric pain, edema  Round Ligament Pain:  The uterus has several ligaments which provide support and keep the uterus in place. As the  uterus grows these ligaments are pulled and stretched which often causes sharp stabbing like pain in the inguinal area.   You may find a pregnancy support band helpful. Changing positions may also help. Yoga is a great way to cope with round ligament and low back pain in pregnancy.    Massage may also help with low back pain   Things to Consider at this Point in your Pregnancy:  Some women experience swelling in their feet during pregnancy. Compression stockings may help  Drink plenty of water and stay active   Make sure you are eating frequent small meals, nuts are a wonderful snack to keep with you            Return in about 1 week (around 2025) for Routine OB visit.      We have gone over prenatal care to include the timing and content of visits. I informed her how to contact the office and/or on call person in the event of any problems and encouraged her to do so when she feels it is necessary.  We then spent time answering her questions which she indicated were answered to her satisfaction.    Leslie Kessler,   2025 14:34 EDT

## 2025-04-07 NOTE — PROGRESS NOTES
Routine Prenatal Visit     Subjective  Shruti Pearl is a 30 y.o.  at 37w3d here for her routine OB visit.   She is taking her prenatal vitamins.Reports no loss of fluid or vaginal bleeding. Patient doing well without any complaints. Pregnancy complicated by:     Patient Active Problem List   Diagnosis    Bipolar II disorder    Supervision of other normal pregnancy, antepartum    Previous  section    Hx of  delivery, currently pregnant    History of placental abruption    Abnormal glucose in pregnancy, antepartum    HSV infection    Maternal anemia in pregnancy, antepartum         OB History    Para Term  AB Living   4 2 1 1 1 2   SAB IAB Ectopic Molar Multiple Live Births     1  0 2      # Outcome Date GA Lbr Olegario/2nd Weight Sex Type Anes PTL Lv   4 Current            3 Term 23 38w4d  3650 g (8 lb 0.8 oz) M CS-LTranv Spinal N CINDY   2 Ectopic 2018 8w0d    ECTOPIC      1  14 35w5d  2929 g (6 lb 7.3 oz) M CS-LTranv Spinal Y CINDY           ROS:   Review of Systems - History obtained from the patient  General ROS: negative  Hematological and Lymphatic ROS: negative  Endocrine ROS: negative  Breast ROS: negative  Respiratory ROS: negative  Cardiovascular ROS: negative  Gastrointestinal ROS: negative  Genito-Urinary ROS: negative  Musculoskeletal ROS: negative  Neurological ROS: negative  Dermatological ROS: negative    Objective  Physical Exam:   Vitals:    25 0828   BP: 121/82       Uterine Size: size equals dates  FHT: 110-160 BPM    General appearance - alert, well appearing, and in no distress  Mental status - alert, oriented to person, place, and time  Abdomen- Soft, Gravid uterus, non-tender to palpation  Musculoskeletal: negative for - gait disturbance or joint pain  Extremeties: negative swelling or cyanosis   Dermatological: negative rashes or skin lesions       Assessment/Plan:   Diagnoses and all orders for this visit:    1. Supervision of other  normal pregnancy, antepartum (Primary)  -     POC Urinalysis Dipstick    2. Hx of  delivery, currently pregnant    3. Rubella non-immune status, antepartum  Comments:  MMR PP    4. Previous  section  Comments:  RLTCs with BS 25    5. History of placental abruption    6. Maternal anemia in pregnancy, antepartum  Comments:  Iron supplementation            Counseling:   OB precautions, leaking, VB, radha acosta vs PTL/Labor  FKC  HTN precautions reviewed: HA, vision change, RUQ/epigastric pain, edema  CS scheduled  CS reviewed in detail.  All history reviewed and updated.  Preop exam performed.  See separate scanned in counseling note.  All questions answered.  She desires to proceed as planned.   Round Ligament Pain:  The uterus has several ligaments which provide support and keep the uterus in place. As the  uterus grows these ligaments are pulled and stretched which often causes sharp stabbing like pain in the inguinal area.   You may find a pregnancy support band helpful. Changing positions may also help. Yoga is a great way to cope with round ligament and low back pain in pregnancy.    Massage may also help with low back pain   Things to Consider at this Point in your Pregnancy:  Some women experience swelling in their feet during pregnancy. Compression stockings may help  Drink plenty of water and stay active   Make sure you are eating frequent small meals, nuts are a wonderful snack to keep with you            Return in about 1 week (around 4/15/2025) for Routine OB visit.      We have gone over prenatal care to include the timing and content of visits. I informed her how to contact the office and/or on call person in the event of any problems and encouraged her to do so when she feels it is necessary.  We then spent time answering her questions which she indicated were answered to her satisfaction.    Leslie Kessler DO  2025 08:41 EDT

## 2025-04-08 ENCOUNTER — ROUTINE PRENATAL (OUTPATIENT)
Dept: OBSTETRICS AND GYNECOLOGY | Age: 30
End: 2025-04-08
Payer: COMMERCIAL

## 2025-04-08 VITALS — SYSTOLIC BLOOD PRESSURE: 121 MMHG | WEIGHT: 233 LBS | BODY MASS INDEX: 39.99 KG/M2 | DIASTOLIC BLOOD PRESSURE: 82 MMHG

## 2025-04-08 DIAGNOSIS — Z87.59 HISTORY OF PLACENTAL ABRUPTION: ICD-10-CM

## 2025-04-08 DIAGNOSIS — O09.899 HX OF PRETERM DELIVERY, CURRENTLY PREGNANT: ICD-10-CM

## 2025-04-08 DIAGNOSIS — Z34.80 SUPERVISION OF OTHER NORMAL PREGNANCY, ANTEPARTUM: Primary | ICD-10-CM

## 2025-04-08 DIAGNOSIS — O99.019 MATERNAL ANEMIA IN PREGNANCY, ANTEPARTUM: ICD-10-CM

## 2025-04-08 DIAGNOSIS — Z98.891 PREVIOUS CESAREAN SECTION: ICD-10-CM

## 2025-04-08 DIAGNOSIS — O09.899 RUBELLA NON-IMMUNE STATUS, ANTEPARTUM: ICD-10-CM

## 2025-04-08 DIAGNOSIS — Z28.39 RUBELLA NON-IMMUNE STATUS, ANTEPARTUM: ICD-10-CM

## 2025-04-08 LAB
GLUCOSE UR STRIP-MCNC: NEGATIVE MG/DL
PROT UR STRIP-MCNC: NEGATIVE MG/DL

## 2025-04-09 ENCOUNTER — CLINICAL SUPPORT (OUTPATIENT)
Dept: OBSTETRICS AND GYNECOLOGY | Age: 30
End: 2025-04-09
Payer: COMMERCIAL

## 2025-04-09 DIAGNOSIS — O99.019 MATERNAL ANEMIA IN PREGNANCY, ANTEPARTUM: ICD-10-CM

## 2025-04-09 LAB
DEPRECATED RDW RBC AUTO: 58.8 FL (ref 37–54)
ERYTHROCYTE [DISTWIDTH] IN BLOOD BY AUTOMATED COUNT: 22.3 % (ref 12.3–15.4)
HCT VFR BLD AUTO: 35.8 % (ref 34–46.6)
HGB BLD-MCNC: 11 G/DL (ref 12–15.9)
MCH RBC QN AUTO: 23.8 PG (ref 26.6–33)
MCHC RBC AUTO-ENTMCNC: 30.7 G/DL (ref 31.5–35.7)
MCV RBC AUTO: 77.3 FL (ref 79–97)
PLATELET # BLD AUTO: 195 10*3/MM3 (ref 140–450)
PMV BLD AUTO: 10.5 FL (ref 6–12)
RBC # BLD AUTO: 4.63 10*6/MM3 (ref 3.77–5.28)
WBC NRBC COR # BLD AUTO: 8.53 10*3/MM3 (ref 3.4–10.8)

## 2025-04-09 PROCEDURE — 85027 COMPLETE CBC AUTOMATED: CPT | Performed by: OBSTETRICS & GYNECOLOGY

## 2025-04-09 NOTE — PROGRESS NOTES
Venipuncture Blood Specimen Collection  Venipuncture performed in left arm by Soraida Kang with good hemostasis. Patient tolerated the procedure well without complications.   04/09/25   Soraida Kang

## 2025-04-16 ENCOUNTER — ROUTINE PRENATAL (OUTPATIENT)
Dept: OBSTETRICS AND GYNECOLOGY | Age: 30
End: 2025-04-16
Payer: COMMERCIAL

## 2025-04-16 VITALS — SYSTOLIC BLOOD PRESSURE: 99 MMHG | BODY MASS INDEX: 39.65 KG/M2 | DIASTOLIC BLOOD PRESSURE: 74 MMHG | WEIGHT: 231 LBS

## 2025-04-16 DIAGNOSIS — Z87.59 HISTORY OF PLACENTAL ABRUPTION: ICD-10-CM

## 2025-04-16 DIAGNOSIS — R30.0 DYSURIA: ICD-10-CM

## 2025-04-16 DIAGNOSIS — O09.899 RUBELLA NON-IMMUNE STATUS, ANTEPARTUM: ICD-10-CM

## 2025-04-16 DIAGNOSIS — Z28.39 RUBELLA NON-IMMUNE STATUS, ANTEPARTUM: ICD-10-CM

## 2025-04-16 DIAGNOSIS — Z98.891 PREVIOUS CESAREAN SECTION: ICD-10-CM

## 2025-04-16 DIAGNOSIS — O09.899 HX OF PRETERM DELIVERY, CURRENTLY PREGNANT: ICD-10-CM

## 2025-04-16 DIAGNOSIS — Z34.80 SUPERVISION OF OTHER NORMAL PREGNANCY, ANTEPARTUM: Primary | ICD-10-CM

## 2025-04-16 DIAGNOSIS — O99.019 MATERNAL ANEMIA IN PREGNANCY, ANTEPARTUM: ICD-10-CM

## 2025-04-16 LAB
GLUCOSE UR STRIP-MCNC: NEGATIVE MG/DL
PROT UR STRIP-MCNC: NEGATIVE MG/DL

## 2025-04-16 PROCEDURE — 87086 URINE CULTURE/COLONY COUNT: CPT | Performed by: OBSTETRICS & GYNECOLOGY

## 2025-04-16 RX ORDER — PRENATAL WITH FERROUS FUM AND FOLIC ACID 3080; 920; 120; 400; 22; 1.84; 3; 20; 10; 1; 12; 200; 27; 25; 2 [IU]/1; [IU]/1; MG/1; [IU]/1; MG/1; MG/1; MG/1; MG/1; MG/1; MG/1; UG/1; MG/1; MG/1; MG/1; MG/1
1 TABLET ORAL DAILY
Qty: 30 TABLET | Refills: 11 | Status: ON HOLD | OUTPATIENT
Start: 2025-04-16

## 2025-04-16 RX ORDER — PRENATAL WITH FERROUS FUM AND FOLIC ACID 3080; 920; 120; 400; 22; 1.84; 3; 20; 10; 1; 12; 200; 27; 25; 2 [IU]/1; [IU]/1; MG/1; [IU]/1; MG/1; MG/1; MG/1; MG/1; MG/1; MG/1; UG/1; MG/1; MG/1; MG/1; MG/1
1 TABLET ORAL DAILY
Qty: 30 TABLET | Refills: 11 | Status: SHIPPED | OUTPATIENT
Start: 2025-04-16 | End: 2025-04-16

## 2025-04-16 RX ORDER — DOCUSATE SODIUM 100 MG/1
100 CAPSULE, LIQUID FILLED ORAL 2 TIMES DAILY
Qty: 60 CAPSULE | Refills: 1 | Status: ON HOLD | OUTPATIENT
Start: 2025-04-16 | End: 2025-04-18

## 2025-04-16 NOTE — PROGRESS NOTES
Routine Prenatal Visit     Subjective  Shruti Pearl is a 30 y.o.  at 38w4d here for her routine OB visit.   She is taking her prenatal vitamins.Reports no loss of fluid or vaginal bleeding. Patient doing well without any complaints. Pregnancy complicated by:     Patient Active Problem List   Diagnosis    Bipolar II disorder    Supervision of other normal pregnancy, antepartum    Previous  section    Hx of  delivery, currently pregnant    History of placental abruption    Abnormal glucose in pregnancy, antepartum    HSV infection    Maternal anemia in pregnancy, antepartum         OB History    Para Term  AB Living   4 2 1 1 1 2   SAB IAB Ectopic Molar Multiple Live Births     1  0 2      # Outcome Date GA Lbr Olegario/2nd Weight Sex Type Anes PTL Lv   4 Current            3 Term 23 38w4d  3650 g (8 lb 0.8 oz) M CS-LTranv Spinal N CINDY   2 Ectopic 2018 8w0d    ECTOPIC      1  14 35w5d  2929 g (6 lb 7.3 oz) M CS-LTranv Spinal Y CINDY           ROS:   Review of Systems - Psychological ROS: negative  Ophthalmic ROS: negative  Endocrine ROS: negative  Breast ROS: negative  Respiratory ROS: negative  Cardiovascular ROS: negative  Gastrointestinal ROS: negative  Genito-Urinary ROS: negative  Musculoskeletal ROS: negative  Neurological ROS: negative  Dermatological ROS: negative    Objective  Physical Exam:   Vitals:    25 0850   BP: 99/74       Uterine Size: size equals dates  FHT: 110-160 BPM    General appearance - alert, well appearing, and in no distress  Mental status - alert, oriented to person, place, and time  Abdomen- Soft, Gravid uterus, non-tender to palpation  Musculoskeletal: negative for - gait disturbance or joint pain  Extremeties: negative swelling or cyanosis   Dermatological: negative rashes or skin lesions   60%/-3    Assessment/Plan:   Diagnoses and all orders for this visit:    1. Supervision of other normal pregnancy, antepartum  (Primary)  -     POC Urinalysis Dipstick    2. Hx of  delivery, currently pregnant    3. Rubella non-immune status, antepartum    4. Previous  section    5. History of placental abruption    6. Maternal anemia in pregnancy, antepartum  Assessment & Plan:  S/p iv infusions, repeat hemoglobin 11 on       7. Dysuria  -     Urine Culture - Urine, Urine, Clean Catch    Other orders  -     Discontinue: Prenatal Vit-Fe Fumarate-FA (Prenatal ) 27-1 MG tablet tablet; Take 1 tablet by mouth Daily.  Dispense: 30 tablet; Refill: 11  -     docusate sodium (Colace) 100 MG capsule; Take 1 capsule by mouth 2 (Two) Times a Day.  Dispense: 60 capsule; Refill: 1  -     Prenatal Vit-Fe Fumarate-FA (Prenatal ) 27-1 MG tablet tablet; Take 1 tablet by mouth Daily.  Dispense: 30 tablet; Refill: 11            Counseling:   OB precautions, leaking, VB, radha acosta vs PTL/Labor  FKC  HTN precautions reviewed: HA, vision change, RUQ/epigastric pain, edema  CS scheduled  CS reviewed in detail.  All history reviewed and updated.  Preop exam performed.  See separate scanned in counseling note.  All questions answered.  She desires to proceed as planned.   Round Ligament Pain:  The uterus has several ligaments which provide support and keep the uterus in place. As the  uterus grows these ligaments are pulled and stretched which often causes sharp stabbing like pain in the inguinal area.   You may find a pregnancy support band helpful. Changing positions may also help. Yoga is a great way to cope with round ligament and low back pain in pregnancy.    Massage may also help with low back pain   Things to Consider at this Point in your Pregnancy:  Some women experience swelling in their feet during pregnancy. Compression stockings may help  Drink plenty of water and stay active   Make sure you are eating frequent small meals, nuts are a wonderful snack to keep with you            Return in about 2 weeks (around 2025)  for Incision check.      We have gone over prenatal care to include the timing and content of visits. I informed her how to contact the office and/or on call person in the event of any problems and encouraged her to do so when she feels it is necessary.  We then spent time answering her questions which she indicated were answered to her satisfaction.    Leslie Kessler DO  4/16/2025 12:43 EDT

## 2025-04-18 ENCOUNTER — HOSPITAL ENCOUNTER (OUTPATIENT)
Facility: HOSPITAL | Age: 30
Setting detail: SURGERY ADMIT
Discharge: HOME | End: 2025-04-18
Attending: OBSTETRICS & GYNECOLOGY | Admitting: OBSTETRICS & GYNECOLOGY
Payer: COMMERCIAL

## 2025-04-18 VITALS
RESPIRATION RATE: 18 BRPM | HEART RATE: 118 BPM | TEMPERATURE: 98 F | OXYGEN SATURATION: 97 % | SYSTOLIC BLOOD PRESSURE: 115 MMHG | DIASTOLIC BLOOD PRESSURE: 79 MMHG

## 2025-04-18 LAB
BACTERIA SPEC AEROBE CULT: NO GROWTH
BILIRUB BLD-MCNC: NEGATIVE MG/DL
CLARITY, POC: CLEAR
COLOR UR: ABNORMAL
GLUCOSE UR STRIP-MCNC: NEGATIVE MG/DL
KETONES UR QL: ABNORMAL
LEUKOCYTE EST, POC: ABNORMAL
NITRITE UR-MCNC: NEGATIVE MG/ML
PH UR: 5.5 [PH] (ref 5–8)
PROT UR STRIP-MCNC: NEGATIVE MG/DL
RBC # UR STRIP: NEGATIVE /UL
SP GR UR: 1.03 (ref 1–1.03)
UROBILINOGEN UR QL: ABNORMAL

## 2025-04-18 PROCEDURE — G0463 HOSPITAL OUTPT CLINIC VISIT: HCPCS

## 2025-04-18 PROCEDURE — 81002 URINALYSIS NONAUTO W/O SCOPE: CPT | Performed by: OBSTETRICS & GYNECOLOGY

## 2025-04-18 PROCEDURE — 59025 FETAL NON-STRESS TEST: CPT

## 2025-04-18 RX ORDER — ACYCLOVIR 400 MG/1
400 TABLET ORAL 2 TIMES DAILY
COMMUNITY

## 2025-04-19 NOTE — NON STRESS TEST
Obstetrical Non-stress Test Interpretation     Name:  Shruti Pearl  MRN: [id number]    30 y.o. female  at 38w6d    Indication: contractions      Fetal Assessment  Fetal Movement: active  Fetal HR Assessment Method: external  Fetal HR (beats/min): 135  Fetal HR Baseline: normal range  Fetal HR Variability: moderate (amplitude range 6 to 25 bpm)  Fetal HR Accelerations: episodic, greater than/equal to 15 bpm, lasting at least 15 seconds  Fetal HR Decelerations: absent  Sinusoidal Pattern Present: absent    /79 (BP Location: Right arm, Patient Position: Sitting)   Pulse 118   Temp 98 °F (36.7 °C) (Oral)   Resp 18   LMP 2024 (Exact Date)   SpO2 97%     Reason for test: OB Triage  Date of Test: 2025  Time frame of test: 1 hour 15min  RN NST Interpretation: Reactive      Dayna Samano RN  2025  22:41 EDT

## 2025-04-19 NOTE — OBED NOTES
ANNITA Rothman  Obstetric History and Physical    Chief Complaint   Patient presents with    Contractions       Subjective     [place]  [facility] [place]    [place]PI:    Patient is a 30 y.o. female  currently at 39w0d, who presents with contractions since this am;  no LOF, vb;  good FM.    Her prenatal care is complicated by  Prior  desires repeat  and Sexually Transmitted Disease genital herpes  no current active lesion or prodromal symptoms are preseent.  Her previous obstetric/gynecological history is noted for is non-contributory.    The following portions of the patients history were reviewed and updated as appropriate:   current medications, allergies, past medical history, past surgical history, past family history, past social history and current problem list.     Prenatal Information:  Prenatal Results       Initial Prenatal Labs       Test Value Reference Range Date Time    Hemoglobin  13.4 g/dL 12.0 - 15.9 10/01/24 0929    Hematocrit  41.4 % 34.0 - 46.6 10/01/24 0929    Platelets  229 10*3/mm3 140 - 450 10/01/24 0929    Rubella IgG  1.41 index Immune >0.99 10/01/24 0929    Hepatitis B SAg  Non-Reactive  Non-Reactive 10/01/24 0929    Hepatitis C Ab  Non-Reactive  Non-Reactive 10/01/24 0929    RPR  Non-Reactive  Non-Reactive 10/01/24 0929    T. Pallidum Ab   Non-Reactive  Non-Reactive 25 1040    ABO  B   25 1032    Rh  Positive   25 1032    Antibody Screen  Negative   10/01/24 0929    HIV  Non-Reactive  Non-Reactive 10/01/24 0929    Urine Culture  No growth   25 0924       25,000 CFU/mL Normal Urogenital Nicole   25 0915       No growth   25 1438       <25,000 CFU/mL Normal Urogenital Nicole   10/01/24 09    Gonorrhea  Not Detected  Not Detected  10/01/24 0925    Chlamydia  Not Detected  Not Detected  10/01/24 0925    TSH        HgB A1c   4.80 % 4.80 - 5.60 10/01/24 0929    Varicella IgG        Hemoglobinopathy Fractionation  Comment   06/15/23 1352     Hemoglobinopathy (genetic testing)        Cystic fibrosis         Spinal muscular atrophy        Fragile X                  Fetal testing        Test Value Reference Range Date Time    NIPT        MSAFP        AFP-4                  2nd and 3rd Trimester       Test Value Reference Range Date Time    Hemoglobin (repeated)  11.0 g/dL 12.0 - 15.9 04/09/25 1519       9.9 g/dL 12.0 - 15.9 03/11/25 1623       10.4 g/dL 12.0 - 15.9 01/08/25 1032       11.2 g/dL 12.0 - 15.9 01/07/25 1040    Hematocrit (repeated)  35.8 % 34.0 - 46.6 04/09/25 1519       31.8 % 34.0 - 46.6 03/11/25 1623       31.9 % 34.0 - 46.6 01/08/25 1032       34.6 % 34.0 - 46.6 01/07/25 1040    Platelets   195 10*3/mm3 140 - 450 04/09/25 1519       211 10*3/mm3 140 - 450 03/11/25 1623       189 10*3/mm3 140 - 450 01/08/25 1032       221 10*3/mm3 140 - 450 01/07/25 1040       229 10*3/mm3 140 - 450 10/01/24 0929    1 hour GTT   143 mg/dL 65 - 139 01/07/25 1040    Antibody Screen (repeated)        3rd TM syphilis scrn (repeated)  RPR         3rd TM syphilis scrn (repeated) TP-Ab        3rd TM syphilis screen TB-Ab (FTA)        Syphilis cascade test TP-Ab (EIA)        Syphilis cascade TPPA        GTT Fasting        GTT 1 Hr        GTT 2 Hr        GTT 3 Hr        Group B Strep  Negative  Negative 03/27/25 0903              Other testing        Test Value Reference Range Date Time    Parvo IgG         CMV IgG                   Drug Screening       Test Value Reference Range Date Time    Amphetamine Screen  Negative  Negative 10/01/24 0925    Barbiturate Screen  Negative  Negative 10/01/24 0925    Benzodiazepine Screen  Negative  Negative 10/01/24 0925    Methadone Screen  Negative  Negative 10/01/24 0925    Phencyclidine Screen  Negative  Negative 10/01/24 0925    Opiates Screen  Negative  Negative 10/01/24 0925    THC Screen  Negative  Negative 10/01/24 0925    Cocaine Screen  Negative  Negative 10/01/24 0925    Propoxyphene Screen        Buprenorphine Screen   Negative  Negative 10/01/24 0925    Methamphetamine Screen  Negative  Negative 10/01/24 0925    Oxycodone Screen  Negative  Negative 10/01/24 0925    Tricyclic Antidepressants Screen  Negative  Negative 10/01/24 0925              Legend    ^: Historical                          External Prenatal Results       Pregnancy Outside Results - Transcribed From Office Records - See Scanned Records For Details       Test Value Date Time    ABO  B  01/08/25 1032    Rh  Positive  01/08/25 1032    Antibody Screen  Negative  01/08/25 1032       Negative  10/01/24 0929    Varicella IgG       Rubella  1.41 index 10/01/24 0929    Hgb  11.0 g/dL 04/09/25 1519       9.9 g/dL 03/11/25 1623       10.4 g/dL 01/08/25 1032       11.2 g/dL 01/07/25 1040       13.4 g/dL 10/01/24 0929    Hct  35.8 % 04/09/25 1519       31.8 % 03/11/25 1623       31.9 % 01/08/25 1032       34.6 % 01/07/25 1040       41.4 % 10/01/24 0929    HgB A1c   4.80 % 10/01/24 0929    1h GTT  143 mg/dL 01/07/25 1040    3h GTT Fasting       3h GTT 1 hour       3h GTT 2 hour       3h GTT 3 hour        Gonorrhea (discrete)  Not Detected  10/01/24 0925    Chlamydia (discrete)  Not Detected  10/01/24 0925    RPR  Non-Reactive  10/01/24 0929    Syphils cascade: TP-Ab (FTA)  Non-Reactive  01/07/25 1040    TP-Ab  Non-Reactive  01/07/25 1040    TP-Ab (EIA)       TPPA       HBsAg  Non-Reactive  10/01/24 0929    Herpes Simplex Virus PCR       Herpes Simplex VIrus Culture       HIV  Non-Reactive  10/01/24 0929    Hep C RNA Quant PCR       Hep C Antibody  Non-Reactive  10/01/24 0929    AFP       NIPT       Cystic Fibrosis (Jennifer)       Cystic Fibroisis        Spinal Muscular atrophy       Fragile X       Group B Strep  Negative  03/27/25 0903    GBS Susceptibility to Clindamycin       GBS Susceptibility to Erythromycin       Fetal Fibronectin       Genetic Testing, Maternal Blood                 Drug Screening       Test Value Date Time    Urine Drug Screen       Amphetamine Screen   Negative  10/01/24 0925    Barbiturate Screen  Negative  10/01/24 0925    Benzodiazepine Screen  Negative  10/01/24 0925    Methadone Screen  Negative  10/01/24 0925    Phencyclidine Screen  Negative  10/01/24 0925    Opiates Screen  Negative  10/01/24 0925    THC Screen  Negative  10/01/24 0925    Cocaine Screen       Propoxyphene Screen       Buprenorphine Screen  Negative  10/01/24 0925    Methamphetamine Screen       Oxycodone Screen  Negative  10/01/24 0925    Tricyclic Antidepressants Screen  Negative  10/01/24 0925              Legend    ^: Historical                             Problem List:     Patient Active Problem List   Diagnosis    Bipolar II disorder    Supervision of other normal pregnancy, antepartum    Previous  section    Hx of  delivery, currently pregnant    History of placental abruption    Abnormal glucose in pregnancy, antepartum    HSV infection    Maternal anemia in pregnancy, antepartum        Past OB History:     OB History    Para Term  AB Living   4 2 1 1 1 2   SAB IAB Ectopic Molar Multiple Live Births   0 0 1 0 0 2      # Outcome Date GA Lbr Olegario/2nd Weight Sex Type Anes PTL Lv   4 Current            3 Term 23 38w4d  3650 g (8 lb 0.8 oz) M CS-LTranv Spinal N CINDY      Name: Rajeev Galeano      Apgar1: 7  Apgar5: 8   2 Ectopic 2018 8w0d    ECTOPIC      1  14 35w5d  2929 g (6 lb 7.3 oz) M CS-LTranv Spinal Y CINDY      Apgar1: 6  Apgar5: 9       Past Medical History: Past Medical History:   Diagnosis Date    Allergies     Anxiety     Asthma     Bipolar disorder     Depression     HSV infection 3/11/2025    H/o of HSV  No recent infections  Valtrex rx sent 25      Kidney stone     Panic disorder     Placental abruption 2014    Premature labor after 22 weeks and before 37 weeks without delivery 2014    PTSD (post-traumatic stress disorder)     Status post  delivery 2014      Past Surgical History Past  Surgical History:   Procedure Laterality Date     SECTION       SECTION N/A 2023    Procedure:  SECTION REPEAT;  Surgeon: Marine Peetrson DO;  Location: [facility] LAB[facility];  Service: Gynecology;  Laterality: N/A;      Family History: Family History   Problem Relation Age of Onset    Depression Father     ADD / ADHD Father     Depression Mother     Bipolar disorder Mother     ADD / ADHD Mother     Suicide Attempts Sister     Anxiety disorder Sister     ADD / ADHD Sister     ADD / ADHD Son     Diabetes Maternal Grandfather     Diabetes Other         UNCLE    Breast cancer Neg Hx     Uterine cancer Neg Hx     Ovarian cancer Neg Hx     Colon cancer Neg Hx       Social History:  reports that she has quit smoking. Her smoking use included cigarettes. She has a 8 pack-year smoking history. She has been exposed to tobacco smoke. She has never used smokeless tobacco.   reports that she does not currently use alcohol.   reports that she does not currently use drugs after having used the following drugs: Marijuana.        General ROS: Pertinent items are noted in HPI        Home Medications:  Prenatal 27-1, acyclovir, busPIRone, escitalopram, and omeprazole    Allergies:  Allergies   Allergen Reactions    Nitrofurantoin Macrocrystal Hives    Penicillins Hives    Erythromycin Unknown - Low Severity       Objective       Vital Signs Range for the last 24 hours  Temperature: Temp:  [98 °F (36.7 °C)] 98 °F (36.7 °C)   Temp Source: Temp src: Oral   BP: BP: (115-123)/(74-79) 115/79   Pulse: Heart Rate:  [102-122] 118   Respirations: Resp:  [16-18] 18   SPO2: SpO2:  [97 %-98 %] 97 %     Physical Examination:   General appearance - alert, well appearing, and in no distress  Mental status - alert, oriented to person, place, and time  Abdomen - soft, nontender, nondistended,   Pelvic - normal external genitalia, vulva, vagina, cervix, and uterus   Back exam - full range of motion, no tenderness or pain on  motion  Neurological - alert, oriented, normal speech  Extremities - peripheral pulses normal  Skin - normal coloration and turgor, no suspicious skin lesions noted    Presentation: vtx   Cervix: Method: sterile vaginal exam performed   Dilation: Cervical Dilation (cm): 2   Effacement: Cervical Effacement: 70   Station:         Fetal Heart Rate Assessment   Method: Fetal HR Assessment Method: external   Beats/min: Fetal HR (beats/min): 135   Baseline: Fetal HR Baseline: normal range   Variability: Fetal HR Variability: moderate (amplitude range 6 to 25 bpm)   Accels: Fetal HR Accelerations: episodic, greater than/equal to 15 bpm, lasting at least 15 seconds   Decels: Fetal HR Decelerations: absent   Tracing Category:       Uterine Assessment   Method: Method: external tocotransducer, palpation   Frequency (min): Contraction Frequency (Minutes): occasional   Ctx Count in 10 min:     Duration:     Intensity: Contraction Intensity: mild by palpation   [place] Units:       Lab Results (last 24 hours)       Procedure Component Value Units Date/Time    POC Urinalysis Dipstick [[id number]]  (Abnormal) Collected: 04/18/25 2146    Specimen: Urine Updated: 04/18/25 2147     Color Dark Yellow     Clarity, UA Clear     Glucose, UA Negative mg/dL      Bilirubin Negative     Ketones, UA 40 mg/dL     Specific Gravity  1.030     Blood, UA Negative     pH, Urine 5.5     Protein, POC Negative mg/dL      Urobilinogen, UA 0.2 E.U./dL     Leukocytes Trace     Nitrite, UA Negative             GBS is negative     Assessment & Plan     False labor        Assessment:  1.  Intrauterine pregnancy at 39w0d gestation with reactive fetal status.    2.  labor  false  3.  Obstetrical history significant for is remarkable for h/o CS  4.  GBS status:   Group B Strep, DNA   Date Value Ref Range Status   03/27/2025 Negative Negative Final       Plan:  1. Discharge to home.  As no evidence of labor;  no cvx change and decrease in ctx;  precautions  given  2.  Plan of care has been reviewed with patient and patient agrees.   3.  Risks, benefits of treatment plan have been discussed.  4.  All questions have been answered.        Electronically signed by Marisol Low MD, 04/19/25, 7:27 AM EDT.

## 2025-04-19 NOTE — NURSING NOTE
, 38 6/7 weeks, presented with complaints of contractions since 2pm becoming more frequent at 6 or 7 pm. Denies water leaking or bleeding. States fetal movement positive. Admitted to triage 2 for evaluation and monitoring. External monitors applied, abdomen soft, non-tender to palpation. SVE 2/70/-3, irritability per toco with occasional mild contractions.   VS WNL  Dr Low on unit and notified of the above and urine results. Orders to re-check SVE in 1 hour.

## 2025-04-20 ENCOUNTER — PREP FOR SURGERY (OUTPATIENT)
Dept: OTHER | Facility: HOSPITAL | Age: 30
End: 2025-04-20
Payer: COMMERCIAL

## 2025-04-20 ENCOUNTER — ANESTHESIA EVENT (OUTPATIENT)
Dept: LABOR AND DELIVERY | Facility: HOSPITAL | Age: 30
End: 2025-04-20
Payer: COMMERCIAL

## 2025-04-20 ENCOUNTER — ANESTHESIA (OUTPATIENT)
Dept: LABOR AND DELIVERY | Facility: HOSPITAL | Age: 30
End: 2025-04-20
Payer: COMMERCIAL

## 2025-04-20 ENCOUNTER — HOSPITAL ENCOUNTER (INPATIENT)
Facility: HOSPITAL | Age: 30
LOS: 3 days | Discharge: HOME | End: 2025-04-23
Attending: OBSTETRICS & GYNECOLOGY | Admitting: OBSTETRICS & GYNECOLOGY
Payer: COMMERCIAL

## 2025-04-20 DIAGNOSIS — O47.9 UTERINE CONTRACTIONS: Primary | ICD-10-CM

## 2025-04-20 DIAGNOSIS — Z98.891 HX OF CESAREAN SECTION: ICD-10-CM

## 2025-04-20 DIAGNOSIS — Z30.09 UNWANTED FERTILITY: ICD-10-CM

## 2025-04-20 PROBLEM — Z37.9 NORMAL LABOR: Status: ACTIVE | Noted: 2025-04-20

## 2025-04-20 LAB
ABO GROUP BLD: NORMAL
AMPHET+METHAMPHET UR QL: NEGATIVE
AMPHETAMINES UR QL: NEGATIVE
ATMOSPHERIC PRESS: 742.3 MMHG
BARBITURATES UR QL SCN: NEGATIVE
BASE EXCESS BLDCOA CALC-SCNC: -2.5 MMOL/L (ref -2–2)
BENZODIAZ UR QL SCN: NEGATIVE
BLD GP AB SCN SERPL QL: NEGATIVE
BUPRENORPHINE SERPL-MCNC: NEGATIVE NG/ML
CANNABINOIDS SERPL QL: NEGATIVE
COCAINE UR QL: NEGATIVE
DEPRECATED RDW RBC AUTO: 62.3 FL (ref 37–54)
ERYTHROCYTE [DISTWIDTH] IN BLOOD BY AUTOMATED COUNT: 23.8 % (ref 12.3–15.4)
FENTANYL UR-MCNC: NEGATIVE NG/ML
HCO3 BLDCOA-SCNC: 24.7 MMOL/L
HCT VFR BLD AUTO: 38.3 % (ref 34–46.6)
HGB BLD-MCNC: 12.2 G/DL (ref 12–15.9)
MCH RBC QN AUTO: 24.5 PG (ref 26.6–33)
MCHC RBC AUTO-ENTMCNC: 31.9 G/DL (ref 31.5–35.7)
MCV RBC AUTO: 76.9 FL (ref 79–97)
METHADONE UR QL SCN: NEGATIVE
OPIATES UR QL: NEGATIVE
OXYCODONE UR QL SCN: NEGATIVE
PCO2 BLDCOA: 51.4 MMHG (ref 33–49)
PCP UR QL SCN: NEGATIVE
PH BLDCOA: 7.29 PH UNITS (ref 7.18–7.34)
PLATELET # BLD AUTO: 218 10*3/MM3 (ref 140–450)
PMV BLD AUTO: 10.1 FL (ref 6–12)
PO2 BLDCOA: 12.3 MMHG
RBC # BLD AUTO: 4.98 10*6/MM3 (ref 3.77–5.28)
RH BLD: POSITIVE
SAO2 % BLDCOA: 11.2 %
T&S EXPIRATION DATE: NORMAL
TREPONEMA PALLIDUM IGG+IGM AB [PRESENCE] IN SERUM OR PLASMA BY IMMUNOASSAY: NORMAL
TRICYCLICS UR QL SCN: NEGATIVE
WBC NRBC COR # BLD AUTO: 10.22 10*3/MM3 (ref 3.4–10.8)

## 2025-04-20 PROCEDURE — 25810000003 LACTATED RINGERS PER 1000 ML: Performed by: OBSTETRICS & GYNECOLOGY

## 2025-04-20 PROCEDURE — 25010000002 ONDANSETRON PER 1 MG: Performed by: REGISTERED NURSE

## 2025-04-20 PROCEDURE — 25010000002 CEFAZOLIN PER 500 MG: Performed by: OBSTETRICS & GYNECOLOGY

## 2025-04-20 PROCEDURE — 25010000002 OXYTOCIN PER 10 UNITS: Performed by: REGISTERED NURSE

## 2025-04-20 PROCEDURE — 25810000003 LACTATED RINGERS PER 1000 ML: Performed by: REGISTERED NURSE

## 2025-04-20 PROCEDURE — 59515 CESAREAN DELIVERY: CPT | Performed by: OBSTETRICS & GYNECOLOGY

## 2025-04-20 PROCEDURE — 25010000002 BUPIVACAINE PF 0.75 % SOLUTION: Performed by: REGISTERED NURSE

## 2025-04-20 PROCEDURE — 86850 RBC ANTIBODY SCREEN: CPT | Performed by: OBSTETRICS & GYNECOLOGY

## 2025-04-20 PROCEDURE — 86780 TREPONEMA PALLIDUM: CPT | Performed by: OBSTETRICS & GYNECOLOGY

## 2025-04-20 PROCEDURE — 25010000002 FAMOTIDINE 10 MG/ML SOLUTION: Performed by: REGISTERED NURSE

## 2025-04-20 PROCEDURE — 58611 LIGATE OVIDUCT(S) ADD-ON: CPT | Performed by: OBSTETRICS & GYNECOLOGY

## 2025-04-20 PROCEDURE — 86901 BLOOD TYPING SEROLOGIC RH(D): CPT | Performed by: OBSTETRICS & GYNECOLOGY

## 2025-04-20 PROCEDURE — 85027 COMPLETE CBC AUTOMATED: CPT | Performed by: OBSTETRICS & GYNECOLOGY

## 2025-04-20 PROCEDURE — 80307 DRUG TEST PRSMV CHEM ANLYZR: CPT | Performed by: OBSTETRICS & GYNECOLOGY

## 2025-04-20 PROCEDURE — 82803 BLOOD GASES ANY COMBINATION: CPT

## 2025-04-20 PROCEDURE — 25010000002 PHENYLEPHRINE 10 MG/ML SOLUTION: Performed by: REGISTERED NURSE

## 2025-04-20 PROCEDURE — 86900 BLOOD TYPING SEROLOGIC ABO: CPT | Performed by: OBSTETRICS & GYNECOLOGY

## 2025-04-20 PROCEDURE — 25010000002 MORPHINE PER 10 MG: Performed by: REGISTERED NURSE

## 2025-04-20 DEVICE — ABSORBABLE HEMOSTAT (OXIDIZED REGENERATED CELLULOSE)
Type: IMPLANTABLE DEVICE | Site: ABDOMEN | Status: FUNCTIONAL
Brand: SURGICEL

## 2025-04-20 RX ORDER — OXYTOCIN 10 [USP'U]/ML
INJECTION, SOLUTION INTRAMUSCULAR; INTRAVENOUS AS NEEDED
Status: DISCONTINUED | OUTPATIENT
Start: 2025-04-20 | End: 2025-04-21 | Stop reason: SURG

## 2025-04-20 RX ORDER — SODIUM CHLORIDE, SODIUM LACTATE, POTASSIUM CHLORIDE, CALCIUM CHLORIDE 600; 310; 30; 20 MG/100ML; MG/100ML; MG/100ML; MG/100ML
150 INJECTION, SOLUTION INTRAVENOUS CONTINUOUS
Status: CANCELLED | OUTPATIENT
Start: 2025-04-20 | End: 2025-04-20

## 2025-04-20 RX ORDER — SODIUM CHLORIDE 0.9 % (FLUSH) 0.9 %
10 SYRINGE (ML) INJECTION EVERY 12 HOURS SCHEDULED
Status: DISCONTINUED | OUTPATIENT
Start: 2025-04-20 | End: 2025-04-21 | Stop reason: HOSPADM

## 2025-04-20 RX ORDER — FAMOTIDINE 10 MG/ML
20 INJECTION, SOLUTION INTRAVENOUS EVERY 12 HOURS SCHEDULED
Status: DISCONTINUED | OUTPATIENT
Start: 2025-04-20 | End: 2025-04-21

## 2025-04-20 RX ORDER — SODIUM CHLORIDE 0.9 % (FLUSH) 0.9 %
10 SYRINGE (ML) INJECTION AS NEEDED
Status: CANCELLED | OUTPATIENT
Start: 2025-04-20

## 2025-04-20 RX ORDER — ACETAMINOPHEN 500 MG
1000 TABLET ORAL ONCE
Status: COMPLETED | OUTPATIENT
Start: 2025-04-20 | End: 2025-04-20

## 2025-04-20 RX ORDER — FAMOTIDINE 10 MG/ML
20 INJECTION, SOLUTION INTRAVENOUS ONCE AS NEEDED
Status: CANCELLED | OUTPATIENT
Start: 2025-04-20

## 2025-04-20 RX ORDER — METHYLERGONOVINE MALEATE 0.2 MG/ML
INJECTION INTRAVENOUS
Status: DISCONTINUED
Start: 2025-04-20 | End: 2025-04-21 | Stop reason: WASHOUT

## 2025-04-20 RX ORDER — OXYTOCIN/0.9 % SODIUM CHLORIDE 30/500 ML
999 PLASTIC BAG, INJECTION (ML) INTRAVENOUS ONCE
Status: CANCELLED | OUTPATIENT
Start: 2025-04-20 | End: 2025-04-20

## 2025-04-20 RX ORDER — ONDANSETRON 2 MG/ML
INJECTION INTRAMUSCULAR; INTRAVENOUS AS NEEDED
Status: DISCONTINUED | OUTPATIENT
Start: 2025-04-20 | End: 2025-04-21 | Stop reason: SURG

## 2025-04-20 RX ORDER — OXYTOCIN/0.9 % SODIUM CHLORIDE 30/500 ML
250 PLASTIC BAG, INJECTION (ML) INTRAVENOUS CONTINUOUS
Status: CANCELLED | OUTPATIENT
Start: 2025-04-20 | End: 2025-04-20

## 2025-04-20 RX ORDER — TRANEXAMIC ACID 10 MG/ML
INJECTION, SOLUTION INTRAVENOUS
Status: DISCONTINUED
Start: 2025-04-20 | End: 2025-04-21 | Stop reason: WASHOUT

## 2025-04-20 RX ORDER — EPHEDRINE SULFATE 50 MG/ML
INJECTION, SOLUTION INTRAVENOUS AS NEEDED
Status: DISCONTINUED | OUTPATIENT
Start: 2025-04-20 | End: 2025-04-21 | Stop reason: SURG

## 2025-04-20 RX ORDER — ONDANSETRON 4 MG/1
4 TABLET, ORALLY DISINTEGRATING ORAL EVERY 6 HOURS PRN
Status: CANCELLED | OUTPATIENT
Start: 2025-04-20

## 2025-04-20 RX ORDER — SODIUM CHLORIDE 9 MG/ML
40 INJECTION, SOLUTION INTRAVENOUS AS NEEDED
Status: CANCELLED | OUTPATIENT
Start: 2025-04-20

## 2025-04-20 RX ORDER — PHENYLEPHRINE HYDROCHLORIDE 10 MG/ML
INJECTION INTRAVENOUS AS NEEDED
Status: DISCONTINUED | OUTPATIENT
Start: 2025-04-20 | End: 2025-04-21 | Stop reason: SURG

## 2025-04-20 RX ORDER — ACETAMINOPHEN 500 MG
1000 TABLET ORAL ONCE
Status: CANCELLED | OUTPATIENT
Start: 2025-04-20 | End: 2025-04-20

## 2025-04-20 RX ORDER — CITRIC ACID/SODIUM CITRATE 334-500MG
30 SOLUTION, ORAL ORAL ONCE
Status: COMPLETED | OUTPATIENT
Start: 2025-04-20 | End: 2025-04-20

## 2025-04-20 RX ORDER — MISOPROSTOL 200 UG/1
800 TABLET ORAL AS NEEDED
Status: CANCELLED | OUTPATIENT
Start: 2025-04-20

## 2025-04-20 RX ORDER — SODIUM CHLORIDE 0.9 % (FLUSH) 0.9 %
10 SYRINGE (ML) INJECTION AS NEEDED
Status: DISCONTINUED | OUTPATIENT
Start: 2025-04-20 | End: 2025-04-21 | Stop reason: HOSPADM

## 2025-04-20 RX ORDER — FAMOTIDINE 20 MG/1
20 TABLET, FILM COATED ORAL ONCE AS NEEDED
Status: CANCELLED | OUTPATIENT
Start: 2025-04-20

## 2025-04-20 RX ORDER — SODIUM CHLORIDE 0.9 % (FLUSH) 0.9 %
10 SYRINGE (ML) INJECTION EVERY 12 HOURS SCHEDULED
Status: CANCELLED | OUTPATIENT
Start: 2025-04-20

## 2025-04-20 RX ORDER — ONDANSETRON 2 MG/ML
4 INJECTION INTRAMUSCULAR; INTRAVENOUS EVERY 6 HOURS PRN
Status: CANCELLED | OUTPATIENT
Start: 2025-04-20

## 2025-04-20 RX ORDER — MORPHINE SULFATE 0.5 MG/ML
INJECTION, SOLUTION EPIDURAL; INTRATHECAL; INTRAVENOUS
Status: COMPLETED | OUTPATIENT
Start: 2025-04-20 | End: 2025-04-20

## 2025-04-20 RX ORDER — CARBOPROST TROMETHAMINE 250 UG/ML
INJECTION, SOLUTION INTRAMUSCULAR
Status: DISCONTINUED
Start: 2025-04-20 | End: 2025-04-21 | Stop reason: WASHOUT

## 2025-04-20 RX ORDER — DEXMEDETOMIDINE HYDROCHLORIDE 100 UG/ML
INJECTION, SOLUTION INTRAVENOUS AS NEEDED
Status: DISCONTINUED | OUTPATIENT
Start: 2025-04-20 | End: 2025-04-21 | Stop reason: SURG

## 2025-04-20 RX ORDER — BUPIVACAINE HYDROCHLORIDE 7.5 MG/ML
INJECTION, SOLUTION EPIDURAL; RETROBULBAR
Status: COMPLETED | OUTPATIENT
Start: 2025-04-20 | End: 2025-04-20

## 2025-04-20 RX ORDER — SODIUM CHLORIDE, SODIUM LACTATE, POTASSIUM CHLORIDE, CALCIUM CHLORIDE 600; 310; 30; 20 MG/100ML; MG/100ML; MG/100ML; MG/100ML
125 INJECTION, SOLUTION INTRAVENOUS CONTINUOUS
Status: ACTIVE | OUTPATIENT
Start: 2025-04-20 | End: 2025-04-20

## 2025-04-20 RX ORDER — SODIUM CHLORIDE, SODIUM LACTATE, POTASSIUM CHLORIDE, CALCIUM CHLORIDE 600; 310; 30; 20 MG/100ML; MG/100ML; MG/100ML; MG/100ML
INJECTION, SOLUTION INTRAVENOUS CONTINUOUS PRN
Status: DISCONTINUED | OUTPATIENT
Start: 2025-04-20 | End: 2025-04-21 | Stop reason: SURG

## 2025-04-20 RX ORDER — MISOPROSTOL 200 UG/1
TABLET ORAL
Status: DISCONTINUED
Start: 2025-04-20 | End: 2025-04-21 | Stop reason: WASHOUT

## 2025-04-20 RX ORDER — LIDOCAINE HYDROCHLORIDE 10 MG/ML
0.5 INJECTION, SOLUTION EPIDURAL; INFILTRATION; INTRACAUDAL; PERINEURAL ONCE AS NEEDED
Status: CANCELLED | OUTPATIENT
Start: 2025-04-20

## 2025-04-20 RX ORDER — CLINDAMYCIN PHOSPHATE 900 MG/50ML
900 INJECTION, SOLUTION INTRAVENOUS ONCE
Status: CANCELLED | OUTPATIENT
Start: 2025-04-20 | End: 2025-04-20

## 2025-04-20 RX ORDER — KETOROLAC TROMETHAMINE 30 MG/ML
30 INJECTION, SOLUTION INTRAMUSCULAR; INTRAVENOUS ONCE
Status: CANCELLED | OUTPATIENT
Start: 2025-04-20 | End: 2025-04-20

## 2025-04-20 RX ORDER — METHYLERGONOVINE MALEATE 0.2 MG/ML
200 INJECTION INTRAVENOUS ONCE AS NEEDED
Status: CANCELLED | OUTPATIENT
Start: 2025-04-20

## 2025-04-20 RX ORDER — SODIUM CHLORIDE 9 MG/ML
40 INJECTION, SOLUTION INTRAVENOUS AS NEEDED
Status: DISCONTINUED | OUTPATIENT
Start: 2025-04-20 | End: 2025-04-21 | Stop reason: HOSPADM

## 2025-04-20 RX ORDER — LIDOCAINE HYDROCHLORIDE 10 MG/ML
0.5 INJECTION, SOLUTION EPIDURAL; INFILTRATION; INTRACAUDAL; PERINEURAL ONCE AS NEEDED
Status: DISCONTINUED | OUTPATIENT
Start: 2025-04-20 | End: 2025-04-21 | Stop reason: HOSPADM

## 2025-04-20 RX ADMIN — PHENYLEPHRINE HYDROCHLORIDE 100 MCG: 10 INJECTION INTRAVENOUS at 23:20

## 2025-04-20 RX ADMIN — BUPIVACAINE HYDROCHLORIDE 1.4 ML: 7.5 INJECTION, SOLUTION EPIDURAL; RETROBULBAR at 22:29

## 2025-04-20 RX ADMIN — SODIUM CHLORIDE, POTASSIUM CHLORIDE, SODIUM LACTATE AND CALCIUM CHLORIDE 125 ML/HR: 600; 310; 30; 20 INJECTION, SOLUTION INTRAVENOUS at 21:35

## 2025-04-20 RX ADMIN — SODIUM CITRATE AND CITRIC ACID MONOHYDRATE 30 ML: 334; 500 SOLUTION ORAL at 22:17

## 2025-04-20 RX ADMIN — SODIUM CHLORIDE, POTASSIUM CHLORIDE, SODIUM LACTATE AND CALCIUM CHLORIDE: 600; 310; 30; 20 INJECTION, SOLUTION INTRAVENOUS at 23:01

## 2025-04-20 RX ADMIN — SODIUM CHLORIDE, POTASSIUM CHLORIDE, SODIUM LACTATE AND CALCIUM CHLORIDE: 600; 310; 30; 20 INJECTION, SOLUTION INTRAVENOUS at 23:19

## 2025-04-20 RX ADMIN — OXYTOCIN 40 UNITS: 10 INJECTION, SOLUTION INTRAMUSCULAR; INTRAVENOUS at 23:01

## 2025-04-20 RX ADMIN — SODIUM CHLORIDE 2000 MG: 9 INJECTION, SOLUTION INTRAVENOUS at 22:17

## 2025-04-20 RX ADMIN — PHENYLEPHRINE HYDROCHLORIDE 100 MCG: 10 INJECTION INTRAVENOUS at 23:10

## 2025-04-20 RX ADMIN — ACETAMINOPHEN 1000 MG: 500 TABLET ORAL at 22:18

## 2025-04-20 RX ADMIN — FAMOTIDINE 20 MG: 10 INJECTION INTRAVENOUS at 22:18

## 2025-04-20 RX ADMIN — EPHEDRINE SULFATE 25 MG: 50 INJECTION INTRAVENOUS at 22:30

## 2025-04-20 RX ADMIN — EPHEDRINE SULFATE 10 MG: 50 INJECTION, SOLUTION INTRAVENOUS at 23:01

## 2025-04-20 RX ADMIN — PHENYLEPHRINE HYDROCHLORIDE 100 MCG: 10 INJECTION INTRAVENOUS at 22:30

## 2025-04-20 RX ADMIN — MORPHINE SULFATE 0.1 MG: 0.5 INJECTION, SOLUTION EPIDURAL; INTRATHECAL; INTRAVENOUS at 22:29

## 2025-04-20 RX ADMIN — DEXMEDETOMIDINE 7 MCG: 100 INJECTION, SOLUTION, CONCENTRATE INTRAVENOUS at 22:29

## 2025-04-20 RX ADMIN — ONDANSETRON 8 MG: 2 INJECTION INTRAMUSCULAR; INTRAVENOUS at 22:26

## 2025-04-21 PROBLEM — O47.9 UTERINE CONTRACTIONS: Status: ACTIVE | Noted: 2025-04-21

## 2025-04-21 PROBLEM — Z30.09 UNWANTED FERTILITY: Status: ACTIVE | Noted: 2025-04-21

## 2025-04-21 PROBLEM — Z98.891 HX OF CESAREAN SECTION: Status: ACTIVE | Noted: 2025-04-21

## 2025-04-21 PROCEDURE — 59025 FETAL NON-STRESS TEST: CPT

## 2025-04-21 PROCEDURE — 88302 TISSUE EXAM BY PATHOLOGIST: CPT | Performed by: OBSTETRICS & GYNECOLOGY

## 2025-04-21 PROCEDURE — 0503F POSTPARTUM CARE VISIT: CPT | Performed by: OBSTETRICS & GYNECOLOGY

## 2025-04-21 PROCEDURE — 25010000002 KETOROLAC TROMETHAMINE PER 15 MG: Performed by: OBSTETRICS & GYNECOLOGY

## 2025-04-21 PROCEDURE — 0UT70ZZ RESECTION OF BILATERAL FALLOPIAN TUBES, OPEN APPROACH: ICD-10-PCS | Performed by: OBSTETRICS & GYNECOLOGY

## 2025-04-21 PROCEDURE — 25010000002 ENOXAPARIN PER 10 MG: Performed by: OBSTETRICS & GYNECOLOGY

## 2025-04-21 PROCEDURE — 25810000003 SODIUM CHLORIDE 0.9 % SOLUTION: Performed by: OBSTETRICS & GYNECOLOGY

## 2025-04-21 PROCEDURE — 51702 INSERT TEMP BLADDER CATH: CPT

## 2025-04-21 PROCEDURE — 25010000002 OXYTOCIN PER 10 UNITS: Performed by: OBSTETRICS & GYNECOLOGY

## 2025-04-21 RX ORDER — ONDANSETRON 4 MG/1
4 TABLET, ORALLY DISINTEGRATING ORAL EVERY 6 HOURS PRN
Status: DISCONTINUED | OUTPATIENT
Start: 2025-04-21 | End: 2025-04-21 | Stop reason: HOSPADM

## 2025-04-21 RX ORDER — HYDROMORPHONE HYDROCHLORIDE 2 MG/ML
0.25 INJECTION, SOLUTION INTRAMUSCULAR; INTRAVENOUS; SUBCUTANEOUS
Status: DISCONTINUED | OUTPATIENT
Start: 2025-04-21 | End: 2025-04-23 | Stop reason: HOSPADM

## 2025-04-21 RX ORDER — METHYLERGONOVINE MALEATE 0.2 MG/ML
200 INJECTION INTRAVENOUS ONCE AS NEEDED
Status: DISCONTINUED | OUTPATIENT
Start: 2025-04-21 | End: 2025-04-21 | Stop reason: HOSPADM

## 2025-04-21 RX ORDER — NALOXONE HCL 0.4 MG/ML
0.4 VIAL (ML) INJECTION ONCE AS NEEDED
Status: ACTIVE | OUTPATIENT
Start: 2025-04-21 | End: 2025-04-22

## 2025-04-21 RX ORDER — FAMOTIDINE 10 MG/ML
20 INJECTION, SOLUTION INTRAVENOUS ONCE AS NEEDED
Status: DISCONTINUED | OUTPATIENT
Start: 2025-04-21 | End: 2025-04-21 | Stop reason: HOSPADM

## 2025-04-21 RX ORDER — ACETAMINOPHEN 325 MG/1
650 TABLET ORAL EVERY 6 HOURS
Status: DISCONTINUED | OUTPATIENT
Start: 2025-04-22 | End: 2025-04-23 | Stop reason: HOSPADM

## 2025-04-21 RX ORDER — LIDOCAINE HYDROCHLORIDE 10 MG/ML
0.5 INJECTION, SOLUTION EPIDURAL; INFILTRATION; INTRACAUDAL; PERINEURAL ONCE AS NEEDED
Status: DISCONTINUED | OUTPATIENT
Start: 2025-04-21 | End: 2025-04-21 | Stop reason: HOSPADM

## 2025-04-21 RX ORDER — ENOXAPARIN SODIUM 100 MG/ML
30 INJECTION SUBCUTANEOUS EVERY 24 HOURS
Status: DISCONTINUED | OUTPATIENT
Start: 2025-04-21 | End: 2025-04-23 | Stop reason: HOSPADM

## 2025-04-21 RX ORDER — DIPHENHYDRAMINE HYDROCHLORIDE 50 MG/ML
12.5 INJECTION, SOLUTION INTRAMUSCULAR; INTRAVENOUS EVERY 6 HOURS PRN
Status: ACTIVE | OUTPATIENT
Start: 2025-04-21 | End: 2025-04-22

## 2025-04-21 RX ORDER — CLINDAMYCIN PHOSPHATE 900 MG/50ML
900 INJECTION, SOLUTION INTRAVENOUS ONCE
Status: DISCONTINUED | OUTPATIENT
Start: 2025-04-21 | End: 2025-04-21

## 2025-04-21 RX ORDER — MISOPROSTOL 200 UG/1
800 TABLET ORAL AS NEEDED
Status: DISCONTINUED | OUTPATIENT
Start: 2025-04-21 | End: 2025-04-21 | Stop reason: HOSPADM

## 2025-04-21 RX ORDER — OXYTOCIN/0.9 % SODIUM CHLORIDE 30/500 ML
125 PLASTIC BAG, INJECTION (ML) INTRAVENOUS ONCE AS NEEDED
Status: COMPLETED | OUTPATIENT
Start: 2025-04-21 | End: 2025-04-21

## 2025-04-21 RX ORDER — ONDANSETRON 2 MG/ML
4 INJECTION INTRAMUSCULAR; INTRAVENOUS EVERY 6 HOURS PRN
Status: DISCONTINUED | OUTPATIENT
Start: 2025-04-21 | End: 2025-04-21 | Stop reason: HOSPADM

## 2025-04-21 RX ORDER — ACETAMINOPHEN 500 MG
1000 TABLET ORAL EVERY 6 HOURS
Status: COMPLETED | OUTPATIENT
Start: 2025-04-21 | End: 2025-04-21

## 2025-04-21 RX ORDER — SODIUM CHLORIDE 9 MG/ML
40 INJECTION, SOLUTION INTRAVENOUS AS NEEDED
Status: DISCONTINUED | OUTPATIENT
Start: 2025-04-21 | End: 2025-04-21 | Stop reason: HOSPADM

## 2025-04-21 RX ORDER — ONDANSETRON 2 MG/ML
4 INJECTION INTRAMUSCULAR; INTRAVENOUS EVERY 6 HOURS PRN
Status: DISCONTINUED | OUTPATIENT
Start: 2025-04-21 | End: 2025-04-23 | Stop reason: HOSPADM

## 2025-04-21 RX ORDER — OXYTOCIN/0.9 % SODIUM CHLORIDE 30/500 ML
250 PLASTIC BAG, INJECTION (ML) INTRAVENOUS CONTINUOUS
Status: DISPENSED | OUTPATIENT
Start: 2025-04-21 | End: 2025-04-21

## 2025-04-21 RX ORDER — OXYTOCIN/0.9 % SODIUM CHLORIDE 30/500 ML
999 PLASTIC BAG, INJECTION (ML) INTRAVENOUS ONCE
Status: DISCONTINUED | OUTPATIENT
Start: 2025-04-21 | End: 2025-04-21 | Stop reason: HOSPADM

## 2025-04-21 RX ORDER — DIPHENHYDRAMINE HCL 25 MG
25 CAPSULE ORAL EVERY 6 HOURS PRN
Status: ACTIVE | OUTPATIENT
Start: 2025-04-21 | End: 2025-04-22

## 2025-04-21 RX ORDER — OXYCODONE HYDROCHLORIDE 5 MG/1
5 TABLET ORAL EVERY 4 HOURS PRN
Status: DISCONTINUED | OUTPATIENT
Start: 2025-04-21 | End: 2025-04-23 | Stop reason: HOSPADM

## 2025-04-21 RX ORDER — ACETAMINOPHEN 500 MG
1000 TABLET ORAL ONCE
Status: DISCONTINUED | OUTPATIENT
Start: 2025-04-21 | End: 2025-04-21 | Stop reason: HOSPADM

## 2025-04-21 RX ORDER — FAMOTIDINE 20 MG/1
20 TABLET, FILM COATED ORAL ONCE AS NEEDED
Status: DISCONTINUED | OUTPATIENT
Start: 2025-04-21 | End: 2025-04-21 | Stop reason: HOSPADM

## 2025-04-21 RX ORDER — KETOROLAC TROMETHAMINE 30 MG/ML
30 INJECTION, SOLUTION INTRAMUSCULAR; INTRAVENOUS ONCE
Status: COMPLETED | OUTPATIENT
Start: 2025-04-21 | End: 2025-04-21

## 2025-04-21 RX ORDER — SODIUM CHLORIDE 0.9 % (FLUSH) 0.9 %
10 SYRINGE (ML) INJECTION AS NEEDED
Status: DISCONTINUED | OUTPATIENT
Start: 2025-04-21 | End: 2025-04-21 | Stop reason: HOSPADM

## 2025-04-21 RX ORDER — IBUPROFEN 600 MG/1
600 TABLET, FILM COATED ORAL EVERY 6 HOURS
Status: DISCONTINUED | OUTPATIENT
Start: 2025-04-22 | End: 2025-04-23 | Stop reason: HOSPADM

## 2025-04-21 RX ORDER — HYDROMORPHONE HYDROCHLORIDE 2 MG/ML
0.5 INJECTION, SOLUTION INTRAMUSCULAR; INTRAVENOUS; SUBCUTANEOUS
Status: DISCONTINUED | OUTPATIENT
Start: 2025-04-21 | End: 2025-04-23 | Stop reason: HOSPADM

## 2025-04-21 RX ORDER — OXYCODONE HYDROCHLORIDE 5 MG/1
10 TABLET ORAL EVERY 4 HOURS PRN
Status: DISCONTINUED | OUTPATIENT
Start: 2025-04-21 | End: 2025-04-23 | Stop reason: HOSPADM

## 2025-04-21 RX ORDER — ALUMINA, MAGNESIA, AND SIMETHICONE 2400; 2400; 240 MG/30ML; MG/30ML; MG/30ML
15 SUSPENSION ORAL EVERY 4 HOURS PRN
Status: DISCONTINUED | OUTPATIENT
Start: 2025-04-21 | End: 2025-04-23 | Stop reason: HOSPADM

## 2025-04-21 RX ORDER — KETOROLAC TROMETHAMINE 30 MG/ML
15 INJECTION, SOLUTION INTRAMUSCULAR; INTRAVENOUS EVERY 6 HOURS
Status: COMPLETED | OUTPATIENT
Start: 2025-04-21 | End: 2025-04-22

## 2025-04-21 RX ORDER — CALCIUM CARBONATE 500 MG/1
1 TABLET, CHEWABLE ORAL EVERY 4 HOURS PRN
Status: DISCONTINUED | OUTPATIENT
Start: 2025-04-21 | End: 2025-04-23 | Stop reason: HOSPADM

## 2025-04-21 RX ORDER — CARBOPROST TROMETHAMINE 250 UG/ML
250 INJECTION, SOLUTION INTRAMUSCULAR
Status: DISCONTINUED | OUTPATIENT
Start: 2025-04-21 | End: 2025-04-21 | Stop reason: HOSPADM

## 2025-04-21 RX ORDER — SODIUM CHLORIDE 0.9 % (FLUSH) 0.9 %
10 SYRINGE (ML) INJECTION EVERY 12 HOURS SCHEDULED
Status: DISCONTINUED | OUTPATIENT
Start: 2025-04-21 | End: 2025-04-21 | Stop reason: HOSPADM

## 2025-04-21 RX ORDER — HYDROCORTISONE 25 MG/G
CREAM TOPICAL 3 TIMES DAILY PRN
Status: DISCONTINUED | OUTPATIENT
Start: 2025-04-21 | End: 2025-04-23 | Stop reason: HOSPADM

## 2025-04-21 RX ORDER — ONDANSETRON 2 MG/ML
4 INJECTION INTRAMUSCULAR; INTRAVENOUS EVERY 6 HOURS PRN
Status: ACTIVE | OUTPATIENT
Start: 2025-04-21 | End: 2025-04-22

## 2025-04-21 RX ORDER — SODIUM CHLORIDE, SODIUM LACTATE, POTASSIUM CHLORIDE, CALCIUM CHLORIDE 600; 310; 30; 20 MG/100ML; MG/100ML; MG/100ML; MG/100ML
150 INJECTION, SOLUTION INTRAVENOUS CONTINUOUS
Status: DISCONTINUED | OUTPATIENT
Start: 2025-04-21 | End: 2025-04-21

## 2025-04-21 RX ORDER — MISOPROSTOL 200 UG/1
800 TABLET ORAL ONCE AS NEEDED
Status: DISCONTINUED | OUTPATIENT
Start: 2025-04-21 | End: 2025-04-21 | Stop reason: HOSPADM

## 2025-04-21 RX ORDER — MISOPROSTOL 200 UG/1
200 TABLET ORAL EVERY 6 HOURS SCHEDULED
Status: COMPLETED | OUTPATIENT
Start: 2025-04-21 | End: 2025-04-21

## 2025-04-21 RX ORDER — ONDANSETRON 4 MG/1
4 TABLET, ORALLY DISINTEGRATING ORAL EVERY 6 HOURS PRN
Status: DISCONTINUED | OUTPATIENT
Start: 2025-04-21 | End: 2025-04-23 | Stop reason: HOSPADM

## 2025-04-21 RX ORDER — KETOROLAC TROMETHAMINE 30 MG/ML
30 INJECTION, SOLUTION INTRAMUSCULAR; INTRAVENOUS ONCE
Status: DISCONTINUED | OUTPATIENT
Start: 2025-04-21 | End: 2025-04-21 | Stop reason: SDUPTHER

## 2025-04-21 RX ADMIN — MAGNESIUM HYDROXIDE 5 ML: 2400 SUSPENSION ORAL at 23:18

## 2025-04-21 RX ADMIN — ACETAMINOPHEN 1000 MG: 500 TABLET ORAL at 23:19

## 2025-04-21 RX ADMIN — MISOPROSTOL 200 MCG: 200 TABLET ORAL at 06:38

## 2025-04-21 RX ADMIN — KETOROLAC TROMETHAMINE 15 MG: 30 INJECTION, SOLUTION INTRAMUSCULAR; INTRAVENOUS at 12:56

## 2025-04-21 RX ADMIN — OXYTOCIN 125 ML/HR: 10 INJECTION, SOLUTION INTRAMUSCULAR; INTRAVENOUS at 02:04

## 2025-04-21 RX ADMIN — MISOPROSTOL 200 MCG: 200 TABLET ORAL at 17:46

## 2025-04-21 RX ADMIN — OXYCODONE 5 MG: 5 TABLET ORAL at 17:46

## 2025-04-21 RX ADMIN — MISOPROSTOL 200 MCG: 200 TABLET ORAL at 23:19

## 2025-04-21 RX ADMIN — ENOXAPARIN SODIUM 30 MG: 30 INJECTION, SOLUTION SUBCUTANEOUS at 23:19

## 2025-04-21 RX ADMIN — KETOROLAC TROMETHAMINE 15 MG: 30 INJECTION, SOLUTION INTRAMUSCULAR; INTRAVENOUS at 06:38

## 2025-04-21 RX ADMIN — KETOROLAC TROMETHAMINE 15 MG: 30 INJECTION, SOLUTION INTRAMUSCULAR; INTRAVENOUS at 20:00

## 2025-04-21 RX ADMIN — ACETAMINOPHEN 1000 MG: 500 TABLET ORAL at 04:48

## 2025-04-21 RX ADMIN — KETOROLAC TROMETHAMINE 30 MG: 30 INJECTION, SOLUTION INTRAMUSCULAR; INTRAVENOUS at 01:21

## 2025-04-21 RX ADMIN — MISOPROSTOL 200 MCG: 200 TABLET ORAL at 12:56

## 2025-04-21 RX ADMIN — ACETAMINOPHEN 1000 MG: 500 TABLET ORAL at 17:00

## 2025-04-21 RX ADMIN — ACETAMINOPHEN 1000 MG: 500 TABLET ORAL at 10:38

## 2025-04-21 NOTE — PROGRESS NOTES
"ANNITA Rothman   PROGRESS NOTE    Post-Op Day 1 S/P     Subjective:  Patient has no complaints  Pain controlled  Not OOB yet  Calderón cath in place  Denies HA, vision change, or RUQ/epigastric pain  No lightheadedness or dizziness    Objective    Objective:  Vital signs (most recent): Blood pressure (!) 82/47, pulse 70, temperature 97.6 °F (36.4 °C), temperature source Oral, resp. rate 16, height 162.6 cm (64\"), weight 105 kg (231 lb), last menstrual period 2024, SpO2 99%, currently breastfeeding.       Temp:  [97.6 °F (36.4 °C)-98.5 °F (36.9 °C)] 97.6 °F (36.4 °C)  Heart Rate:  [] 70  Resp:  [16-20] 16  BP: ()/(47-90) 82/47     Physical Exam:  GEN: alert and in no distress  Abdomen: fundus firm - below the umbilicus  abdomen soft + BS's  Incision: dressed  Extremities: Laura's sign negative bilaterally, no redness or tenderness in the calves or thighs     Labs:  Lab Results (last 24 hours)       Procedure Component Value Units Date/Time    CBC (No Diff) [[id number]]  (Abnormal) Collected: 25    Specimen: Blood Updated: 25     WBC 10.22 10*3/mm3      RBC 4.98 10*6/mm3      Hemoglobin 12.2 g/dL      Hematocrit 38.3 %      MCV 76.9 fL      MCH 24.5 pg      MCHC 31.9 g/dL      RDW 23.8 %      RDW-SD 62.3 fl      MPV 10.1 fL      Platelets 218 10*3/mm3     Treponema pallidum AB w/Reflex RPR [[id number]]  (Normal) Collected: 25    Specimen: Blood Updated: 25     Treponemal AB Total Non-Reactive    Narrative:      Reactive results will reflex RPR testing.    Urine Drug Screen - Urine, Clean Catch [[id number]]  (Normal) Collected: 25    Specimen: Urine, Clean Catch Updated: 25     THC, Screen, Urine Negative     Phencyclidine (PCP), Urine Negative     Cocaine Screen, Urine Negative     Methamphetamine, Ur Negative     Opiate Screen Negative     Amphetamine Screen, Urine Negative     Benzodiazepine Screen, Urine Negative     Tricyclic " Antidepressants Screen Negative     Methadone Screen, Urine Negative     Barbiturates Screen, Urine Negative     Oxycodone Screen, Urine Negative     Buprenorphine, Screen, Urine Negative    Narrative:      Cutoff For Drugs Screened:    Amphetamines               500 ng/ml  Barbiturates               200 ng/ml  Benzodiazepines            150 ng/ml  Cocaine                    150 ng/ml  Methadone                  200 ng/ml  Opiates                    100 ng/ml  Phencyclidine               25 ng/ml  THC                         50 ng/ml  Methamphetamine            500 ng/ml  Tricyclic Antidepressants  300 ng/ml  Oxycodone                  100 ng/ml  Buprenorphine               10 ng/ml    The normal value for all drugs tested is negative. This report includes unconfirmed screening results, with the cutoff values listed, to be used for medical treatment purposes only.  Unconfirmed results must not be used for non-medical purposes such as employment or legal testing.  Clinical consideration should be applied to any drug of abuse test, particularly when unconfirmed results are used.      Fentanyl, Urine - Urine, Clean Catch [[id number]]  (Normal) Collected: 04/20/25 2148    Specimen: Urine, Clean Catch Updated: 04/20/25 2218     Fentanyl, Urine Negative    Narrative:      Negative Threshold:      Fentanyl 5 ng/mL     The normal value for the drug tested is negative. This report includes final unconfirmed screening results to be used for medical treatment purposes only. Unconfirmed results must not be used for non-medical purposes such as employment or legal testing. Clinical consideration should be applied to any drug of abuse test, particularly when unconfirmed results are used.           Blood Gas, Arterial, Cord [[id number]]  (Abnormal) Collected: 04/20/25 2317    Specimen: Cord Blood Arterial from Umbilical Cord Updated: 04/20/25 2320     pH, Cord Arterial 7.29 pH Units      pCO2, Cord Arterial 51.4 mmHg      pO2, Cord  Arterial 12.3 mmHg      HCO3, Cord Arterial 24.7 mmol/L      Base Exc, Cord Arterial -2.5 mmol/L      Comment: Serial Number: [id number]:  [id number]        O2 Sat, Cord Arterial 11.2 %      Barometric Pressure for Blood Gas 742.3000 mmHg     Tissue Pathology Exam [[id number]] Collected: 25 0142    Specimen: Tissue from Fallopian Tube, Left; Tissue from Fallopian Tube, Right Updated: 25 0652               Assessment & Plan        Normal labor    Hx of  section    Unwanted fertility    Uterine contractions    Assessment & Plan    Assessment:    Shruti Pearl is Day 1  post-partum  , Low Transverse  .      Plan:    Routine postpartum/postop care    Remove diop, Ambulate, Saline lock IV, PO pain meds, Importance of wound care/keep clean and dry, Breast feeding support        Oanh Pimentel DO  25  07:26 EDT

## 2025-04-21 NOTE — NURSING NOTE
" 39.1 weeks presents with c/o ctx: since around 1930 that have become more intense, pt. Denies leakage of fluid or vaginal bleeding, states +fm noted, pt. States \"I am scheduled for a RC/S and tubal removal with  in the morning\",regular ctx: noted on monitor, palpate moderate, sve 4-5/70/-3, ,  notified of the above, states she will notify  and go ahead prepare pt. For RC/S tonight.   "

## 2025-04-21 NOTE — ANESTHESIA POSTPROCEDURE EVALUATION
Patient: Shruti Pearl    Procedure Summary       Date: 25 Room / Location: [facility] LABOR DELIVERY  [facility] LABOR DELIVERY    Anesthesia Start:  Anesthesia Stop: 25    Procedure:  SECTION REPEAT (Abdomen) Diagnosis:     Surgeons: Oanh Pimentel DO Provider: Lyndsey Menjivar CRNA    Anesthesia Type: spinal ASA Status: 2 - Emergent            Anesthesia Type: spinal    Vitals  Vitals Value Taken Time   BP 82/47 25 06:44   Temp 36.4 °C (97.6 °F) 25 06:44   Pulse 70 25 06:44   Resp 16 25 06:44   SpO2 99 % 25 03:15           Post Anesthesia Care and Evaluation    Patient location during evaluation: bedside  Patient participation: complete - patient participated  Level of consciousness: awake  Pain score: 1  Pain management: adequate    Airway patency: patent  Anesthetic complications: No anesthetic complications  PONV Status: none  Cardiovascular status: acceptable and stable  Respiratory status: acceptable  Hydration status: acceptable  Post Neuraxial Block status: Motor and sensory function returned to baseline and No signs or symptoms of PDPH

## 2025-04-21 NOTE — H&P
ANNITA Rothmna  Obstetric History and Physical    Chief Complaint   Patient presents with    Contractions       Subjective     PNC provided by:  DANIKA    HPI:    Patient is a 30 y.o. female  currently at 39w1d, who presents with contractions since 1900;  no LOF, vb;  good FM;  .    Her prenatal care is complicated by  Prior  desires repeat  and h/o HSV - no lesions;   Her previous obstetric/gynecological history is noted for is non-contributory.    The following portions of the patients history were reviewed and updated as appropriate:   current medications, allergies, past medical history, past surgical history, past family history, past social history and current problem list.     Prenatal Information:  Prenatal Results       Initial Prenatal Labs       Test Value Reference Range Date Time    Hemoglobin  13.4 g/dL 12.0 - 15.9 10/01/24 0929    Hematocrit  41.4 % 34.0 - 46.6 10/01/24 0929    Platelets  229 10*3/mm3 140 - 450 10/01/24 0929    Rubella IgG  1.41 index Immune >0.99 10/01/24 0929    Hepatitis B SAg  Non-Reactive  Non-Reactive 10/01/24 0929    Hepatitis C Ab  Non-Reactive  Non-Reactive 10/01/24 0929    RPR  Non-Reactive  Non-Reactive 10/01/24 0929    T. Pallidum Ab   Non-Reactive  Non-Reactive 25 1040    ABO  B   25 1032    Rh  Positive   25 1032    Antibody Screen  Negative   10/01/24 0929    HIV  Non-Reactive  Non-Reactive 10/01/24 0929    Urine Culture  No growth   25 0924       25,000 CFU/mL Normal Urogenital Nicole   25 0915       No growth   25 1438       <25,000 CFU/mL Normal Urogenital Nicole   10/01/24 09    Gonorrhea  Not Detected  Not Detected  10/01/24 0925    Chlamydia  Not Detected  Not Detected  10/01/24 0925    TSH        HgB A1c   4.80 % 4.80 - 5.60 10/01/24 09    Varicella IgG        Hemoglobinopathy Fractionation  Comment   06/15/23 1352    Hemoglobinopathy (genetic testing)        Cystic fibrosis         Spinal muscular atrophy         Fragile X                  Fetal testing        Test Value Reference Range Date Time    NIPT        MSAFP        AFP-4                  2nd and 3rd Trimester       Test Value Reference Range Date Time    Hemoglobin (repeated)  11.0 g/dL 12.0 - 15.9 04/09/25 1519       9.9 g/dL 12.0 - 15.9 03/11/25 1623       10.4 g/dL 12.0 - 15.9 01/08/25 1032       11.2 g/dL 12.0 - 15.9 01/07/25 1040    Hematocrit (repeated)  35.8 % 34.0 - 46.6 04/09/25 1519       31.8 % 34.0 - 46.6 03/11/25 1623       31.9 % 34.0 - 46.6 01/08/25 1032       34.6 % 34.0 - 46.6 01/07/25 1040    Platelets   195 10*3/mm3 140 - 450 04/09/25 1519       211 10*3/mm3 140 - 450 03/11/25 1623       189 10*3/mm3 140 - 450 01/08/25 1032       221 10*3/mm3 140 - 450 01/07/25 1040       229 10*3/mm3 140 - 450 10/01/24 0929    1 hour GTT   143 mg/dL 65 - 139 01/07/25 1040    Antibody Screen (repeated)        3rd TM syphilis scrn (repeated)  RPR         3rd TM syphilis scrn (repeated) TP-Ab        3rd TM syphilis screen TB-Ab (FTA)        Syphilis cascade test TP-Ab (EIA)        Syphilis cascade TPPA        GTT Fasting        GTT 1 Hr        GTT 2 Hr        GTT 3 Hr        Group B Strep  Negative  Negative 03/27/25 0903              Other testing        Test Value Reference Range Date Time    Parvo IgG         CMV IgG                   Drug Screening       Test Value Reference Range Date Time    Amphetamine Screen  Negative  Negative 10/01/24 0925    Barbiturate Screen  Negative  Negative 10/01/24 0925    Benzodiazepine Screen  Negative  Negative 10/01/24 0925    Methadone Screen  Negative  Negative 10/01/24 0925    Phencyclidine Screen  Negative  Negative 10/01/24 0925    Opiates Screen  Negative  Negative 10/01/24 0925    THC Screen  Negative  Negative 10/01/24 0925    Cocaine Screen  Negative  Negative 10/01/24 0925    Propoxyphene Screen        Buprenorphine Screen  Negative  Negative 10/01/24 0925    Methamphetamine Screen  Negative  Negative 10/01/24  0925    Oxycodone Screen  Negative  Negative 10/01/24 0925    Tricyclic Antidepressants Screen  Negative  Negative 10/01/24 0925              Legend    ^: Historical                          External Prenatal Results       Pregnancy Outside Results - Transcribed From Office Records - See Scanned Records For Details       Test Value Date Time    ABO  B  01/08/25 1032    Rh  Positive  01/08/25 1032    Antibody Screen  Negative  01/08/25 1032       Negative  10/01/24 0929    Varicella IgG       Rubella  1.41 index 10/01/24 0929    Hgb  11.0 g/dL 04/09/25 1519       9.9 g/dL 03/11/25 1623       10.4 g/dL 01/08/25 1032       11.2 g/dL 01/07/25 1040       13.4 g/dL 10/01/24 0929    Hct  35.8 % 04/09/25 1519       31.8 % 03/11/25 1623       31.9 % 01/08/25 1032       34.6 % 01/07/25 1040       41.4 % 10/01/24 0929    HgB A1c   4.80 % 10/01/24 0929    1h GTT  143 mg/dL 01/07/25 1040    3h GTT Fasting       3h GTT 1 hour       3h GTT 2 hour       3h GTT 3 hour        Gonorrhea (discrete)  Not Detected  10/01/24 0925    Chlamydia (discrete)  Not Detected  10/01/24 0925    RPR  Non-Reactive  10/01/24 0929    Syphils cascade: TP-Ab (FTA)  Non-Reactive  01/07/25 1040    TP-Ab  Non-Reactive  01/07/25 1040    TP-Ab (EIA)       TPPA       HBsAg  Non-Reactive  10/01/24 0929    Herpes Simplex Virus PCR       Herpes Simplex VIrus Culture       HIV  Non-Reactive  10/01/24 0929    Hep C RNA Quant PCR       Hep C Antibody  Non-Reactive  10/01/24 0929    AFP       NIPT       Cystic Fibrosis (Jennifer)       Cystic Fibroisis        Spinal Muscular atrophy       Fragile X       Group B Strep  Negative  03/27/25 0903    GBS Susceptibility to Clindamycin       GBS Susceptibility to Erythromycin       Fetal Fibronectin       Genetic Testing, Maternal Blood                 Drug Screening       Test Value Date Time    Urine Drug Screen       Amphetamine Screen  Negative  10/01/24 0925    Barbiturate Screen  Negative  10/01/24 0925     Benzodiazepine Screen  Negative  10/01/24 0925    Methadone Screen  Negative  10/01/24 0925    Phencyclidine Screen  Negative  10/01/24 0925    Opiates Screen  Negative  10/01/24 0925    THC Screen  Negative  10/01/24 0925    Cocaine Screen       Propoxyphene Screen       Buprenorphine Screen  Negative  10/01/24 0925    Methamphetamine Screen       Oxycodone Screen  Negative  10/01/24 0925    Tricyclic Antidepressants Screen  Negative  10/01/24 0925              Legend    ^: Historical                             Problem List:     Patient Active Problem List   Diagnosis    Bipolar II disorder    Supervision of other normal pregnancy, antepartum    Previous  section    Hx of  delivery, currently pregnant    History of placental abruption    Abnormal glucose in pregnancy, antepartum    HSV infection    Maternal anemia in pregnancy, antepartum    Normal labor        Past OB History:     OB History    Para Term  AB Living   4 2 1 1 1 2   SAB IAB Ectopic Molar Multiple Live Births   0 0 1 0 0 2      # Outcome Date GA Lbr Olegario/2nd Weight Sex Type Anes PTL Lv   4 Current            3 Term 23 38w4d  3650 g (8 lb 0.8 oz) M CS-LTranv Spinal N CINDY      Name: Rajeev Galeano      Apgar1: 7  Apgar5: 8   2 Ectopic 2018 8w0d    ECTOPIC      1  14 35w5d  2929 g (6 lb 7.3 oz) M CS-LTranv Spinal Y CINDY      Apgar1: 6  Apgar5: 9       Past Medical History: Past Medical History:   Diagnosis Date    Allergies     Anxiety     Asthma     Bipolar disorder     Depression     HSV infection 3/11/2025    H/o of HSV  No recent infections  Valtrex rx sent 25      Kidney stone     Panic disorder     Placental abruption 2014    Premature labor after 22 weeks and before 37 weeks without delivery 2014    PTSD (post-traumatic stress disorder)     Status post  delivery 2014      Past Surgical History Past Surgical History:   Procedure Laterality Date      SECTION       SECTION N/A 2023    Procedure:  SECTION REPEAT;  Surgeon: Marine Peterson DO;  Location: [facility] LABOR DELIVERY;  Service: Gynecology;  Laterality: N/A;      Family History: Family History   Problem Relation Age of Onset    Depression Father     ADD / ADHD Father     Depression Mother     Bipolar disorder Mother     ADD / ADHD Mother     Suicide Attempts Sister     Anxiety disorder Sister     ADD / ADHD Sister     ADD / ADHD Son     Diabetes Maternal Grandfather     Diabetes Other         UNCLE    Breast cancer Neg Hx     Uterine cancer Neg Hx     Ovarian cancer Neg Hx     Colon cancer Neg Hx       Social History:  reports that she has quit smoking. Her smoking use included cigarettes. She has a 8 pack-year smoking history. She has been exposed to tobacco smoke. She has never used smokeless tobacco.   reports that she does not currently use alcohol.   reports that she does not currently use drugs after having used the following drugs: Marijuana.        General ROS: Pertinent items are noted in HPI        Home Medications:  Prenatal 27-1, acyclovir, busPIRone, escitalopram, and omeprazole    Allergies:  Allergies   Allergen Reactions    Nitrofurantoin Macrocrystal Hives    Penicillins Hives    Erythromycin Unknown - Low Severity       Objective       Vital Signs Range for the last 24 hours  Temperature: Temp:  [98.5 °F (36.9 °C)] 98.5 °F (36.9 °C)   Temp Source: Temp src: Oral   BP: BP: (117)/(81) 117/81   Pulse: Heart Rate:  [96] 96   Respirations: Resp:  [20] 20   SPO2: SpO2:  [99 %] 99 %     Physical Examination:   General appearance - alert, well appearing, and in no distress  Mental status - alert, oriented to person, place, and time  Abdomen - soft, nontender, nondistended,   Pelvic - normal external genitalia, vulva, vagina, cervix, and uterus   Back exam - full range of motion, no tenderness or pain on motion  Neurological - alert, oriented, normal speech  Extremities -  peripheral pulses normal  Skin - normal coloration and turgor, no suspicious skin lesions noted    Presentation: vtx   Cervix: Method: sterile vaginal exam performed   Dilation: Cervical Dilation (cm): 4-5   Effacement: Cervical Effacement: 70   Station:         Fetal Heart Rate Assessment :  140s, R, GLTV, cat 1  Method:     Beats/min:     Baseline:     Variability:     Accels:     Decels:     Tracing Category:       Uterine Assessment :  q 3 min  Method:     Frequency (min):     Ctx Count in 10 min:     Duration:     Intensity:     [place] Units:       Lab Results (last 24 hours)       ** No results found for the last 24 hours. **             GBS is negative     Assessment & Plan       Normal labor        Assessment:  1.  Intrauterine pregnancy at 39w1d gestation with reactive fetal status.    2.  labor  without ROM  3.  Obstetrical history significant for is remarkable for h/o CS  4.  GBS status:   Group B Strep, DNA   Date Value Ref Range Status   2025 Negative Negative Final       Plan:  1.  with Tubal scheduled tomorrow but will proceed with delivery tonight;  Dr. Pimentel awared  2.  Plan of care has been reviewed with patient and patient agrees.   3.  Risks, benefits of treatment plan have been discussed.  4.  All questions have been answered.        Electronically signed by Marisol Low MD, 25, 9:34 PM EDT.

## 2025-04-21 NOTE — ANESTHESIA PREPROCEDURE EVALUATION
Anesthesia Evaluation     Patient summary reviewed and Nursing notes reviewed   no history of anesthetic complications:   NPO Solid Status: Waived due to emergency  NPO Liquid Status: > 2 hours           Airway   Mallampati: II  TM distance: >3 FB  Dental - normal exam     Pulmonary - normal exam   (+) asthma,  Cardiovascular - negative cardio ROS and normal exam  Exercise tolerance: good (4-7 METS)        Neuro/Psych  (+) psychiatric history Bipolar and Depression  GI/Hepatic/Renal/Endo    (+) GERD well controlled, renal disease- stones    Musculoskeletal (-) negative ROS    Abdominal    Substance History - negative use     OB/GYN    (+) Pregnant        Other - negative ROS       ROS/Med Hx Other: PAT Nursing Notes unavailable.                 Anesthesia Plan    ASA 2 - emergent     spinal     (HSV on zovirax preventive, no outbreaks during pregnancy)    Anesthetic plan, risks, benefits, and alternatives have been provided, discussed and informed consent has been obtained with: patient.    Plan discussed with CRNA.      CODE STATUS:    Code Status (Patient has no pulse and is not breathing): CPR (Attempt to Resuscitate)  Medical Interventions (Patient has pulse or is breathing): Full Support  Level Of Support Discussed With: Patient

## 2025-04-21 NOTE — PROGRESS NOTES
Late entry:  I was called by the hospitalist that patient with a previous  scheduled for repeat  with bilateral salpingectomies arrived in labor.  I saw the patient prior to going back for the repeat .  She was counseled to risks and benefits and confirmed that she desired sterilization procedure she was given opportunity ask questions and her questions were answered to her satisfaction.

## 2025-04-21 NOTE — L&D DELIVERY NOTE
SECTION REPEAT  Procedure Report    Patient Name:  Shruti Pearl  YOB: 1995    Date of Surgery:  2025     Indications: Previous  in labor desires repeat    Pre-op Diagnosis:   IUP at 39.2 weeks  Previous  section desires repeat  Uterine contractions  Unwanted fertility  Obesity       Post-Op Diagnosis Codes:  Same    Procedure/CPT® Codes:  No CPT Code Applied in Case Entry    Procedure(s):   SECTION REPEAT  Right salpingectomy, left proximal salpingectomy    Staff:  Surgeon(s):  Oanh Pimentel DO Hendrick, Tina R, MD         Anesthesia: Spinal    Estimated Blood Loss:  650 mL    Implants:    Implant Name Type Inv. Item Serial No.  Lot No. LRB No. Used Action   HEMOST ABS SURGICEL ORIG 2X14IN STRL - [id number] Implant HEMOST ABS SURGICEL ORIG 2X14IN STRL  [employer] J [id number] N/A 1 Implanted       Specimen:          Specimens       ID Source Type Tests Collected By Collected At Frozen?    A Fallopian Tube, Right Tissue TISSUE PATHOLOGY EXAM   Oanh Pimentel,  25 2320 No    This specimen was not marked as sent.    B Fallopian Tube, Left Tissue TISSUE PATHOLOGY EXAM   Oanh Pimentel,  25 2326 No    This specimen was not marked as sent.                Findings: Normal right tube and ovary.  Left distal tube not seen and left ovary not seen.  Left distal tube appeared to be encased in adhesions between the peritoneum and the uterus    Complications: None    Description of Procedure: After the appropriate consents had been obtained, the patient was taken  to the operative suite where she was placed in sitting position for  placement of a spinal anesthetic.  After the anesthetic had been placed  the patient was placed in supine position with left lateral uterine  displacement.  Fetal heart tones were obtained and noted to be in a normal  range.  She was prepped and draped in usual sterile fashion.  A  Pfannenstiel  incision was made through her previous incision and sharp and  blunt dissection was carried out to the rectus fascia which was incised in  the midline and developed laterally as well as superiorly and inferiorly.  The rectus muscles were  in the midline and the abdominal cavity  was entered sharply.  The bladder peritoneum was noted to be adhered high  onto the lower surface of the uterus.  The bladder adhesions were taken  down and the bladder tract behind the bladder blade.  The lower uterine  segment was noted to be extremely thin so the decision was made not to go  through the previous old incision.  The uterus was incised in a transverse  fashion and incision was extended bluntly.  Amniotomy revealed thin meconium stained   fluid.  A viable female  was delivered at 2300 hrs.  The infant was  bulb suctioned prior to deliver the shoulders.  The cord was clamped x2  and cut after 30 seconds.  The infant was handed off to the nurses were in  attendance for the delivery.  The weight was 7 pounds 2.6 ounces.  Apgars  were 8 and 9.  A specimen for the arterial cord pH as well as the cord  blood was obtained.  The placenta was delivered spontaneously and intact.  The uterus was delivered onto the abdomen.  The uterine incision was  reapproximated using a suture of 0 Vicryl and then imbricated in the  second layer using a suture of 0 Vicryl.  There was still some bleeding  that was contained with some figure-of-eight's.  The patient was desirous  of removal of the tubes if possible.  The right tube was grasped followed  to the fimbriated end and using the LigaSure device and remaining very  close to the antimesenteric portion of the tube, the tube was desiccated  cut and handed off the field.  The patient states that she has had a previous ectopic but had no surgical intervention.  She states she took methotrexate.  The proximal portion of the left tube was seen.  The distal portion of the tube was  encased in some adhesions between the peritoneum and the uterus.  The left ovary was also not seen.  The proximal portion of the tube was grasped and resected using the LigaSure device remaining very close to the antimesenteric portion of the tube.  Excellent hemostasis was noted.  The uterine incision was  reinspected and there was still some oozing but no active bleeding was noted.   The cul-de-sac was  irrigated with  warm saline all remaining blood clots removed.  The uterus was returned to  the abdomen.  The pelvic gutters were irrigated with warm saline.  All  remaining blood clots were removed.  The uterine incision was reinspected  and again some oozing was noted but no active bleeding was identified.  A piece of Surgicel was placed over the uterine incision.  The Calderón catheter was noted to be draining  clear urine.  The rectus muscles were loosely reapproximated in an  interrupted fashion using suture of 0 Vicryl.  The rectus fascia is  reapproximated using suture of 0 Vicryl.  The subcutaneous tissues were  irrigated with warm saline and the subcutaneous bleeders were cauterized  with electrocautery.  Dulce Maria's fascia was loosely reapproximated in a  running fashion using suture 3-0 chromic.  The skin was reapproximated in  a subcuticular fashion using suture of 4-0 Kwill.  A pressure dressing was applied.  The vagina was evacuated of  all remaining blood clots.  The patient was noted to be draining clear  urine.  The sponge counts and instrument counts were verified as correct.  The patient was awakened and transferred to the recovery room in stable  and satisfactory condition.   Dr Marisol Low MD was responsible for performing the following activities: Retraction, Suction, Irrigation, Suturing, and Delivery of Fetus and their skilled assistance was necessary for the success of this case.         Oanh Pimentel,      Date: 4/21/2025  Time: 00:19 EDT

## 2025-04-21 NOTE — PLAN OF CARE
"Goal Outcome Evaluation:      Repeat C/S performed by Dr. Pimentel. Delivered at 2300 on 4/20/25. . Fundal massage in recovery showed scant to light rubra bleeding, 1 below umbilicus, midline and firm fundus without massage. 125 ml/hr Pitocin still infusing. Indwelling urinary catheter still in place. Patient is able to move upper part of lower extremities and is \"beginning to feel her toes.\" Felicia care performed in recovery before being transported to postpartum. VS WNL. Slow introduction of food and fluids began in recovery. Patient tolerated ice chips, water, and crackers well.                                       "

## 2025-04-21 NOTE — ANESTHESIA PROCEDURE NOTES
Spinal Block    Pre-sedation assessment completed: 4/20/2025 10:00 PM    Patient reassessed immediately prior to procedure    Patient location during procedure: OB  Start Time: 4/20/2025 10:27 PM  Stop Time: 4/20/2025 10:29 PM  Indication:at surgeon's request and post-op pain management  Performed By  CRNA/CAA: Lyndsey Menjivar CRNA  Preanesthetic Checklist  Completed: patient identified, IV checked, risks and benefits discussed, surgical consent, monitors and equipment checked, pre-op evaluation and timeout performed  Spinal Block Prep:  Patient Position:sitting  Sterile Tech:cap, gloves, mask and sterile barriers  Prep:Betadine  Patient Monitoring:blood pressure monitoring, continuous pulse oximetry and EKG    Spinal Block Procedure  Approach:midline  Guidance:landmark technique and palpation technique  Location:L3-L4  Needle Type:Pencan  Needle Gauge:24 G  Placement of Spinal needle event:cerebrospinal fluid aspirated  Paresthesia: no  Fluid Appearance:clear  Medications: bupivacaine PF (MARCAINE) injection 0.75% - Intrathecal   1.4 mL - 4/20/2025 10:29:00 PM  morphine PF (DURAMORPH) injection - Intrathecal   0.1 mg - 4/20/2025 10:29:00 PM   Post Assessment  Patient Tolerance:patient tolerated the procedure well with no apparent complications  Complications no  Additional Notes  Skin infiltration with Lidocaine 1%. Introducer inserted, followed by spinal needle. + CSF return. Intrathecal marcaine and PF duramorph/fentanyl injected (see eMAR). Spinal needle/introducer removed. Site clean/dry/intact.    Spinal placed with ease on first attempt, no redirecting

## 2025-04-21 NOTE — PLAN OF CARE
Goal Outcome Evaluation:              Outcome Evaluation: Assessment and vital signs WNL. Pt unable to void after straight cath x1. Calderón anchored. Breastfeeding and bonding well with infant.

## 2025-04-21 NOTE — PLAN OF CARE
Goal Outcome Evaluation:  Plan of Care Reviewed With: patient        Progress: improving  Outcome Evaluation: Fundus is firm with scant to light bleeding. Pain is well controlled with scheduled tylenol and toradol. Breastfeeding infant every 2 to 3 hours and bonding well                              Detail Level: Detailed Depth Of Biopsy: dermis Was A Bandage Applied: Yes Size Of Lesion In Cm: 1 X Size Of Lesion In Cm: 0 Biopsy Type: H and E Biopsy Method: Dermablade Anesthesia Type: 1% lidocaine with epinephrine Anesthesia Volume In Cc (Will Not Render If 0): 0.5 Hemostasis: Drysol Wound Care: Petrolatum Dressing: no dressing applied Destruction After The Procedure: No Type Of Destruction Used: Curettage Curettage Text: The wound bed was treated with curettage after the biopsy was performed. Cryotherapy Text: The wound bed was treated with cryotherapy after the biopsy was performed. Electrodesiccation Text: The wound bed was treated with electrodesiccation after the biopsy was performed. Electrodesiccation And Curettage Text: The wound bed was treated with electrodesiccation and curettage after the biopsy was performed. Silver Nitrate Text: The wound bed was treated with silver nitrate after the biopsy was performed. Lab: -[id number] Lab Facility: [facility] Consent: Written consent was obtained and risks were reviewed including but not limited to scarring, infection, bleeding, scabbing, incomplete removal, nerve damage and allergy to anesthesia. Post-Care Instructions: I reviewed with the patient in detail post-care instructions. Patient is to keep the biopsy site dry overnight, and then apply bacitracin twice daily until healed. Patient may apply hydrogen peroxide soaks to remove any crusting. Notification Instructions: Patient will be notified of biopsy results. However, patient instructed to call the office if not contacted within 2 weeks. Billing Type: [employer] Information: Selecting Yes will display possible errors in your note based on the variables you have selected. This validation is only offered as a suggestion for you. PLEASE NOTE THAT THE VALIDATION TEXT WILL BE REMOVED WHEN YOU FINALIZE YOUR NOTE. IF YOU WANT TO FAX A PRELIMINARY NOTE YOU WILL NEED TO TOGGLE THIS TO 'NO' IF YOU DO NOT WANT IT IN YOUR FAXED NOTE.

## 2025-04-22 LAB
BASOPHILS # BLD AUTO: 0.02 10*3/MM3 (ref 0–0.2)
BASOPHILS NFR BLD AUTO: 0.2 % (ref 0–1.5)
CYTO UR: NORMAL
DEPRECATED RDW RBC AUTO: 62.8 FL (ref 37–54)
EOSINOPHIL # BLD AUTO: 0.13 10*3/MM3 (ref 0–0.4)
EOSINOPHIL NFR BLD AUTO: 1.4 % (ref 0.3–6.2)
ERYTHROCYTE [DISTWIDTH] IN BLOOD BY AUTOMATED COUNT: 23.1 % (ref 12.3–15.4)
HCT VFR BLD AUTO: 32.8 % (ref 34–46.6)
HGB BLD-MCNC: 10.6 G/DL (ref 12–15.9)
IMM GRANULOCYTES # BLD AUTO: 0.04 10*3/MM3 (ref 0–0.05)
IMM GRANULOCYTES NFR BLD AUTO: 0.4 % (ref 0–0.5)
LAB AP CASE REPORT: NORMAL
LAB AP CLINICAL INFORMATION: NORMAL
LARGE PLATELETS: NORMAL
LYMPHOCYTES # BLD AUTO: 1.44 10*3/MM3 (ref 0.7–3.1)
LYMPHOCYTES NFR BLD AUTO: 15.3 % (ref 19.6–45.3)
MCH RBC QN AUTO: 25.1 PG (ref 26.6–33)
MCHC RBC AUTO-ENTMCNC: 32.3 G/DL (ref 31.5–35.7)
MCV RBC AUTO: 77.7 FL (ref 79–97)
MICROCYTES BLD QL: NORMAL
MONOCYTES # BLD AUTO: 0.55 10*3/MM3 (ref 0.1–0.9)
MONOCYTES NFR BLD AUTO: 5.9 % (ref 5–12)
NEUTROPHILS NFR BLD AUTO: 7.21 10*3/MM3 (ref 1.7–7)
NEUTROPHILS NFR BLD AUTO: 76.8 % (ref 42.7–76)
NRBC BLD AUTO-RTO: 0 /100 WBC (ref 0–0.2)
PATH REPORT.FINAL DX SPEC: NORMAL
PATH REPORT.GROSS SPEC: NORMAL
PLATELET # BLD AUTO: 156 10*3/MM3 (ref 140–450)
PMV BLD AUTO: 9.5 FL (ref 6–12)
RBC # BLD AUTO: 4.22 10*6/MM3 (ref 3.77–5.28)
WBC MORPH BLD: NORMAL
WBC NRBC COR # BLD AUTO: 9.39 10*3/MM3 (ref 3.4–10.8)

## 2025-04-22 PROCEDURE — 25010000002 KETOROLAC TROMETHAMINE PER 15 MG: Performed by: OBSTETRICS & GYNECOLOGY

## 2025-04-22 PROCEDURE — 85025 COMPLETE CBC W/AUTO DIFF WBC: CPT | Performed by: OBSTETRICS & GYNECOLOGY

## 2025-04-22 PROCEDURE — 25010000002 ENOXAPARIN PER 10 MG: Performed by: OBSTETRICS & GYNECOLOGY

## 2025-04-22 PROCEDURE — 0503F POSTPARTUM CARE VISIT: CPT | Performed by: OBSTETRICS & GYNECOLOGY

## 2025-04-22 PROCEDURE — 85007 BL SMEAR W/DIFF WBC COUNT: CPT | Performed by: OBSTETRICS & GYNECOLOGY

## 2025-04-22 RX ADMIN — KETOROLAC TROMETHAMINE 15 MG: 30 INJECTION, SOLUTION INTRAMUSCULAR; INTRAVENOUS at 01:26

## 2025-04-22 RX ADMIN — ACETAMINOPHEN 650 MG: 325 TABLET ORAL at 05:02

## 2025-04-22 RX ADMIN — MAGNESIUM HYDROXIDE 5 ML: 2400 SUSPENSION ORAL at 08:04

## 2025-04-22 RX ADMIN — IBUPROFEN 600 MG: 600 TABLET, FILM COATED ORAL at 17:38

## 2025-04-22 RX ADMIN — ENOXAPARIN SODIUM 30 MG: 30 INJECTION, SOLUTION SUBCUTANEOUS at 23:04

## 2025-04-22 RX ADMIN — OXYCODONE 5 MG: 5 TABLET ORAL at 06:32

## 2025-04-22 RX ADMIN — ACETAMINOPHEN 650 MG: 325 TABLET ORAL at 16:13

## 2025-04-22 RX ADMIN — ACETAMINOPHEN 650 MG: 325 TABLET ORAL at 23:04

## 2025-04-22 RX ADMIN — OXYCODONE 5 MG: 5 TABLET ORAL at 14:09

## 2025-04-22 RX ADMIN — IBUPROFEN 600 MG: 600 TABLET, FILM COATED ORAL at 12:41

## 2025-04-22 RX ADMIN — MAGNESIUM HYDROXIDE 5 ML: 2400 SUSPENSION ORAL at 16:13

## 2025-04-22 RX ADMIN — OXYCODONE 5 MG: 5 TABLET ORAL at 18:56

## 2025-04-22 RX ADMIN — ACETAMINOPHEN 650 MG: 325 TABLET ORAL at 11:03

## 2025-04-22 NOTE — LACTATION NOTE
LN in to see patient. Patient had been sleeping at previous attempt to visit. Infant appears ready to eat and feeding cues reviewed with patient. Placed infant skin to skin and gained permission from patient to assist with deep latch. On oral assessment, upper lip appears tight. Infant vigorously suckled on LN's gloved finger. Patient easily able to express colostrum. Infant repeatedly attempted to latch to right breast unsuccessfully. Latch too shallow with too small of a gape. Infant transferred to left breast in cross-cradle positioning and successfully latched deeply to breast. Pain denied by patient. Audible swallows and deep jaw excursion noted. Encouraged patient to attempt next feeding session on R breast.     Discussed attempting to breastfeed baby on demand or at least every 2-2.5 hours. Feeding cues discussed. Encouraged patient to do awake skin to skin contact with  as much as possible, especially during periods of infant sleepiness. LN discussed proper positioning and deep latching techniques. LN discussed normal  feeding behavior and expected output during the first few days of breastfeeding. Patient states she has a pump for home use. Encouraged pt. to call out as needed for LN/staff assistance. Pt. verbalized good understanding.

## 2025-04-22 NOTE — LACTATION NOTE
LC in to assist with this feeding. Patient states she is struggling to get a deep latch. LC noted that baby's suck is backwards at times and this pushes her off the breast. Some suck training and calming used and after multiple attempts she did get on deeply. LC instructed mother to work with infant prior to latching to get a deep latch with suck training and the nipple shield may help organize infant's suck better.

## 2025-04-22 NOTE — PROGRESS NOTES
" [facility]   PROGRESS NOTE    Post-Op Day 2 S/P     Subjective:  Patient has no complaints  Pain controlled  Tolerating a regular diet  Passing flatus  Ambulating  Calderón just removed, no void yet  Denies HA, vision change, or RUQ/epigastric pain  No lightheadedness or dizziness    Objective    Objective:  Vital signs (most recent): Blood pressure 110/82, pulse 83, temperature 98.3 °F (36.8 °C), temperature source Oral, resp. rate 16, height 162.6 cm (64\"), weight 105 kg (231 lb), last menstrual period 2024, SpO2 99%, currently breastfeeding.       Temp:  [98.2 °F (36.8 °C)-99.2 °F (37.3 °C)] 98.3 °F (36.8 °C)  Heart Rate:  [78-94] 83  Resp:  [16-18] 16  BP: ()/(50-82) 110/82     Physical Exam:  GEN: alert and in no distress  Abdomen: fundus firm - below the umbilicus  abdomen soft + BS's  Incision: dressed, no drainage  Extremities: Laura's sign negative bilaterally, no redness or tenderness in the calves or thighs:     Labs:  Lab Results (last 24 hours)       Procedure Component Value Units Date/Time    CBC & Differential [[id number]]  (Abnormal) Collected: 25    Specimen: Blood Updated: 25    Narrative:      The following orders were created for panel order CBC & Differential.  Procedure                               Abnormality         Status                     ---------                               -----------         ------                     CBC Auto Differential[[id number]]        Abnormal            Final result               Scan Slide[[id number]]                                       Final result                 Please view results for these tests on the individual orders.    CBC Auto Differential [[id number]]  (Abnormal) Collected: 25    Specimen: Blood Updated: 25     WBC 9.39 10*3/mm3      RBC 4.22 10*6/mm3      Hemoglobin 10.6 g/dL      Hematocrit 32.8 %      MCV 77.7 fL      MCH 25.1 pg      MCHC 32.3 g/dL      RDW 23.1 %      " RDW-SD 62.8 fl      MPV 9.5 fL      Platelets 156 10*3/mm3      Neutrophil % 76.8 %      Lymphocyte % 15.3 %      Monocyte % 5.9 %      Eosinophil % 1.4 %      Basophil % 0.2 %      Immature Grans % 0.4 %      Neutrophils, Absolute 7.21 10*3/mm3      Lymphocytes, Absolute 1.44 10*3/mm3      Monocytes, Absolute 0.55 10*3/mm3      Eosinophils, Absolute 0.13 10*3/mm3      Basophils, Absolute 0.02 10*3/mm3      Immature Grans, Absolute 0.04 10*3/mm3      nRBC 0.0 /100 WBC     Scan Slide [[id number]] Collected: 25    Specimen: Blood Updated: 25     Microcytes Slight/1+     WBC Morphology Normal     Large Platelets Slight/1+               Assessment & Plan        Normal labor    Hx of  section    Unwanted fertility    Uterine contractions    Assessment & Plan    Assessment:    Shruti Pearl is Day 2  post-partum  , Low Transverse  .      Plan:    Routine postpartum/postop care    Remove diop, Remove bandage, Shower, PO pain meds, Breast feeding support        Oanh Pimentel DO  25  09:27 EDT

## 2025-04-22 NOTE — PLAN OF CARE
Goal Outcome Evaluation:  Plan of Care Reviewed With: patient        Progress: improving  Outcome Evaluation: VSS. Fundus is firm with scant to light bleeding. Urine has been aedquate all night, cath removed this am. Up ad katie. Bonding well with infant                              Yes

## 2025-04-22 NOTE — DISCHARGE SUMMARY
GYN Discharge Summary      Admit Date:  2025  Discharge Date: 2025    Reason for Admission/Chief Complaint:  Normal labor [O80, Z37.9]  Hx of  section [Z98.891]   IUP @ 39.1 weeks  Previous CS desires repeat  Uterine contractions  Unwanted Fertility  Maternal Obesity    Final Diagnosis:  same  To do at FU:   Pending Results       None            Hospital Course/Signifcant Findings/Treatment/Procedure performed:  RLTCS, Right salpingectomy, Left partial salpingectomy  On the day of discharge POSTOP CSECTION tolerating a regular diet, ambulating, pain well controlled, urinating spontaneously and lochia appropriate.   Vital signs were stable and afebrile.  Exam was within normal limits.  Incision was intact, well approximated without signs of infection ([employer] Wound [facility] System in place if present).  Abdomen was soft nondistended non-tender.  Fundus was below umbilicus and non-tender.  Meeting discharge criteria and desired discharge home.  Postop instructions and FU reviewed and questions answered.    Results from last 7 days   Lab Units 25  0524 25  2147   WBC 10*3/mm3 9.39 10.22   HEMOGLOBIN g/dL 10.6* 12.2   HEMATOCRIT % 32.8* 38.3   PLATELETS 10*3/mm3 156 218             Results for orders placed or performed during the hospital encounter of 25   CBC (No Diff)    Collection Time: 25  9:47 PM    Specimen: Blood   Result Value Ref Range    WBC 10.22 3.40 - 10.80 10*3/mm3    RBC 4.98 3.77 - 5.28 10*6/mm3    Hemoglobin 12.2 12.0 - 15.9 g/dL    Hematocrit 38.3 34.0 - 46.6 %    MCV 76.9 (L) 79.0 - 97.0 fL    MCH 24.5 (L) 26.6 - 33.0 pg    MCHC 31.9 31.5 - 35.7 g/dL    RDW 23.8 (H) 12.3 - 15.4 %    RDW-SD 62.3 (H) 37.0 - 54.0 fl    MPV 10.1 6.0 - 12.0 fL    Platelets 218 140 - 450 10*3/mm3   Treponema pallidum AB w/Reflex RPR    Collection Time: 25  9:47 PM    Specimen: Blood   Result Value Ref Range    Treponemal AB Total Non-Reactive Non-Reactive   Type & Screen     Collection Time: 04/20/25  9:47 PM    Specimen: Blood   Result Value Ref Range    ABO Type B     RH type Positive     Antibody Screen Negative     T&S Expiration Date 4/23/2025 11:59:59 PM    Urine Drug Screen - Urine, Clean Catch    Collection Time: 04/20/25  9:48 PM    Specimen: Urine, Clean Catch   Result Value Ref Range    THC, Screen, Urine Negative Negative    Phencyclidine (PCP), Urine Negative Negative    Cocaine Screen, Urine Negative Negative    Methamphetamine, Ur Negative Negative    Opiate Screen Negative Negative    Amphetamine Screen, Urine Negative Negative    Benzodiazepine Screen, Urine Negative Negative    Tricyclic Antidepressants Screen Negative Negative    Methadone Screen, Urine Negative Negative    Barbiturates Screen, Urine Negative Negative    Oxycodone Screen, Urine Negative Negative    Buprenorphine, Screen, Urine Negative Negative   Fentanyl, Urine - Urine, Clean Catch    Collection Time: 04/20/25  9:48 PM    Specimen: Urine, Clean Catch   Result Value Ref Range    Fentanyl, Urine Negative Negative   Blood Gas, Arterial, Cord    Collection Time: 04/20/25 11:17 PM    Specimen: Umbilical Cord; Cord Blood Arterial   Result Value Ref Range    pH, Cord Arterial 7.29 7.18 - 7.34 pH Units    pCO2, Cord Arterial 51.4 (H) 33.0 - 49.0 mmHg    pO2, Cord Arterial 12.3 mmHg    HCO3, Cord Arterial 24.7 mmol/L    Base Exc, Cord Arterial -2.5 (L) -2.0 - 2.0 mmol/L    O2 Sat, Cord Arterial 11.2 %    Barometric Pressure for Blood Gas 742.3000 mmHg   Tissue Pathology Exam    Collection Time: 04/21/25  1:42 AM    Specimen: A: Fallopian Tube, Left; Tissue    [id number] 2: Fallopian Tube, Right; Tissue   Result Value Ref Range    Case Report       Surgical Pathology Report                         Case: [id number]                                  Authorizing Provider:  Oanh Pimentel DO      Collected:           04/21/2025 01:42 AM          Ordering Location:     [facility]      Received:         "    04/21/2025 06:52 AM                                 LABOR AND DELIVERY                                                           Pathologist:           Madhav Thompson MD                                                            Specimens:   1) - Fallopian Tube, Left, partial                                                                  2) - Fallopian Tube, Right                                                                 Clinical Information       Unwanted fertility      Final Diagnosis       1. Partial left fallopian tube, partial salpingectomy:   - No significant pathologic change      2. Right fallopian tube, tubal ligation:   - No significant pathologic change      Gross Description       1. Fallopian Tube, Left.  The specimen is received in 1 formalin filled container labeled \" left fallopian tube; partial\" and consists of a 2.5 cm in length by 0.7 cm in average diameter purple-gray, smooth segment of fallopian tube.  No fimbria are present.  Sectioning reveals an unremarkable, pinpoint lumen.  No distinct lesions are identified.  The specimen is entirely submitted in 1 cassette.  2. Fallopian Tube, Right.  The specimen is received in 1 formalin filled container labeled \" right fallopian tube\" and consists of a 12.0 cm in length by 0.7 cm in average diameter purple-gray, smooth, fimbriated fallopian tube.  Sectioning reveals an unremarkable, pinpoint lumen.  No distinct lesions are identified.  Representative sections are submitted in 1 cassette, to include the entire fimbriae.   JOSE      Microscopic Description       Microscopic examination performed.     CBC Auto Differential    Collection Time: 04/22/25  5:24 AM    Specimen: Blood   Result Value Ref Range    WBC 9.39 3.40 - 10.80 10*3/mm3    RBC 4.22 3.77 - 5.28 10*6/mm3    Hemoglobin 10.6 (L) 12.0 - 15.9 g/dL    Hematocrit 32.8 (L) 34.0 - 46.6 %    MCV 77.7 (L) 79.0 - 97.0 fL    MCH 25.1 (L) 26.6 - 33.0 pg    MCHC 32.3 31.5 - 35.7 g/dL    RDW " 23.1 (H) 12.3 - 15.4 %    RDW-SD 62.8 (H) 37.0 - 54.0 fl    MPV 9.5 6.0 - 12.0 fL    Platelets 156 140 - 450 10*3/mm3    Neutrophil % 76.8 (H) 42.7 - 76.0 %    Lymphocyte % 15.3 (L) 19.6 - 45.3 %    Monocyte % 5.9 5.0 - 12.0 %    Eosinophil % 1.4 0.3 - 6.2 %    Basophil % 0.2 0.0 - 1.5 %    Immature Grans % 0.4 0.0 - 0.5 %    Neutrophils, Absolute 7.21 (H) 1.70 - 7.00 10*3/mm3    Lymphocytes, Absolute 1.44 0.70 - 3.10 10*3/mm3    Monocytes, Absolute 0.55 0.10 - 0.90 10*3/mm3    Eosinophils, Absolute 0.13 0.00 - 0.40 10*3/mm3    Basophils, Absolute 0.02 0.00 - 0.20 10*3/mm3    Immature Grans, Absolute 0.04 0.00 - 0.05 10*3/mm3    nRBC 0.0 0.0 - 0.2 /100 WBC   Scan Slide    Collection Time: 04/22/25  5:24 AM    Specimen: Blood   Result Value Ref Range    Microcytes Slight/1+ None Seen    WBC Morphology Normal Normal    Large Platelets Slight/1+ None Seen       [unfilled]                 Lab Results   Component Value Date    FIBRINOGEN 512 (H) 01/08/2025       No results found.       Discharge Medications        New Medications        Instructions Start Date   acetaminophen 325 MG tablet  Commonly known as: TYLENOL   650 mg, Oral, Every 6 Hours      ibuprofen 600 MG tablet  Commonly known as: ADVIL,MOTRIN   600 mg, Oral, Every 6 Hours      oxyCODONE 5 MG immediate release tablet  Commonly known as: ROXICODONE   5 mg, Oral, Every 8 Hours PRN             Continue These Medications        Instructions Start Date   acyclovir 400 MG tablet  Commonly known as: ZOVIRAX   400 mg, 2 Times Daily      omeprazole 20 MG capsule  Commonly known as: priLOSEC   20 mg, Daily      Prenatal 27-1 27-1 MG tablet tablet   1 tablet, Oral, Daily                Diet: Regular     Activity: Pelvic Rest: 6 weeks  No heavy lifting >20lbs  No tub bath or pool 6 weeks     Condition at discharge: Good     Follow up with: Dr. Oanh Pimentel DO, or provider of her choice     Follow up in: 1&5 weeks     Complications: None      Disposition:  Home

## 2025-04-22 NOTE — PLAN OF CARE
Problem: Adult Inpatient Plan of Care  Goal: Plan of Care Review  Outcome: Progressing  Goal: Patient-Specific Goal (Individualized)  Outcome: Progressing  Goal: Absence of Hospital-Acquired Illness or Injury  Outcome: Progressing  Intervention: Identify and Manage Fall Risk  Description: Perform standard risk assessment on admission using a validated tool or comprehensive approach appropriate to the patient; reassess fall risk frequently, with change in status or transfer to another level of care.Communicate risk to interprofessional healthcare team; ensure fall risk visible cue.Determine need for increased observation, equipment and environmental modification, as well as use of supportive, nonskid footwear.Adjust safety measures to individual needs and identified risk factors.Reinforce the importance of active participation with fall risk prevention, safety, and physical activity with the patient and family.Perform regular intentional rounding to assess need for position change, pain assessment and personal needs, including assistance with toileting.  Recent Flowsheet Documentation  Taken 4/22/2025 1613 by Joana Saunders RN  Safety Promotion/Fall Prevention: safety round/check completed  Taken 4/22/2025 1457 by Joana Saunders RN  Safety Promotion/Fall Prevention: safety round/check completed  Goal: Optimal Comfort and Wellbeing  Outcome: Progressing  Goal: Readiness for Transition of Care  Outcome: Progressing     Problem: Fall Injury Risk  Goal: Absence of Fall and Fall-Related Injury  Outcome: Progressing  Intervention: Promote Injury-Free Environment  Description: Provide a safe, barrier-free environment that encourages independent activity.Keep care area uncluttered and well-lighted.Determine need for increased observation or monitoring.Avoid use of devices that minimize mobility, such as restraints or indwelling urinary catheter.  Recent Flowsheet Documentation  Taken 4/22/2025 1613 by Joana Saunders  RN  Safety Promotion/Fall Prevention: safety round/check completed  Taken 4/22/2025 1457 by Joana Saunders, RN  Safety Promotion/Fall Prevention: safety round/check completed     Problem: Skin Injury Risk Increased  Goal: Skin Health and Integrity  Outcome: Progressing     Problem: Breastfeeding  Goal: Effective Breastfeeding  Outcome: Progressing   Goal Outcome Evaluation:

## 2025-04-23 ENCOUNTER — PATIENT OUTREACH (OUTPATIENT)
Dept: LABOR AND DELIVERY | Facility: HOSPITAL | Age: 30
End: 2025-04-23
Payer: COMMERCIAL

## 2025-04-23 VITALS
RESPIRATION RATE: 18 BRPM | BODY MASS INDEX: 39.44 KG/M2 | OXYGEN SATURATION: 99 % | TEMPERATURE: 98.1 F | HEART RATE: 90 BPM | SYSTOLIC BLOOD PRESSURE: 123 MMHG | WEIGHT: 231 LBS | HEIGHT: 64 IN | DIASTOLIC BLOOD PRESSURE: 79 MMHG

## 2025-04-23 RX ORDER — OXYCODONE HYDROCHLORIDE 5 MG/1
5 TABLET ORAL EVERY 8 HOURS PRN
Qty: 10 TABLET | Refills: 0 | Status: SHIPPED | OUTPATIENT
Start: 2025-04-23 | End: 2025-04-27

## 2025-04-23 RX ORDER — IBUPROFEN 600 MG/1
600 TABLET, FILM COATED ORAL EVERY 6 HOURS
Qty: 30 TABLET | Refills: 1 | Status: SHIPPED | OUTPATIENT
Start: 2025-04-23

## 2025-04-23 RX ORDER — ACETAMINOPHEN 325 MG/1
650 TABLET ORAL EVERY 6 HOURS
Qty: 30 TABLET | Refills: 1 | Status: SHIPPED | OUTPATIENT
Start: 2025-04-23

## 2025-04-23 RX ADMIN — MAGNESIUM HYDROXIDE 5 ML: 2400 SUSPENSION ORAL at 00:31

## 2025-04-23 RX ADMIN — ACETAMINOPHEN 650 MG: 325 TABLET ORAL at 09:59

## 2025-04-23 RX ADMIN — MAGNESIUM HYDROXIDE 5 ML: 2400 SUSPENSION ORAL at 08:08

## 2025-04-23 RX ADMIN — IBUPROFEN 600 MG: 600 TABLET, FILM COATED ORAL at 06:04

## 2025-04-23 RX ADMIN — OXYCODONE 5 MG: 5 TABLET ORAL at 03:49

## 2025-04-23 RX ADMIN — IBUPROFEN 600 MG: 600 TABLET, FILM COATED ORAL at 13:36

## 2025-04-23 RX ADMIN — IBUPROFEN 600 MG: 600 TABLET, FILM COATED ORAL at 00:31

## 2025-04-23 RX ADMIN — ACETAMINOPHEN 650 MG: 325 TABLET ORAL at 05:02

## 2025-04-23 NOTE — PROGRESS NOTES
[facility]   PROGRESS NOTE    Post-Op Day 3 S/P     Subjective:  Patient has no complaints    Objective    Objective:  Vital signs (most recent): Blood pressure 123/79, pulse 90, temperature 98.1 °F (36.7 °C), temperature source Oral, resp. rate 18, height 162.6 cm (64\"), weight 105 kg (231 lb), last menstrual period 2024, SpO2 99%, currently breastfeeding.       Temp:  [97.5 °F (36.4 °C)-98.1 °F (36.7 °C)] 98.1 °F (36.7 °C)  Heart Rate:  [68-90] 90  Resp:  [16-18] 18  BP: (105-123)/(68-79) 123/79     Physical Exam:  GEN: alert and in no distress  Abdomen: fundus firm - below the umbilicus  abdomen soft + BS's    Incision: clean, dry, and intact  Extremities:Laura's sign negative bilaterally, no redness or tenderness in the calves or thighs:     Labs:  Lab Results (last 24 hours)       ** No results found for the last 24 hours. **               Assessment & Plan        Normal labor    Hx of  section    Unwanted fertility    Uterine contractions    Assessment & Plan    Assessment:    Shruti Pearl is Day 3  post-partum  , Low Transverse  .      Plan:    Routine postpartum/postop care    Importance of wound care/keep clean and dry, Breast feeding support, Discharge home, DC meds reviewed, Follow up scheduled        Oanh Pimentel DO  25  12:37 EDT  "

## 2025-04-23 NOTE — LACTATION NOTE
LC in to follow up with lactation progress. Patient states she is latching baby to both sides then if needed she supplements some with formula. Her right nipple is getting tender and she is using the nipple shield at times if latch is a struggle or painful. Patient is planning on discharge today. LC discussed normal infant output patterns to expect and if infant is not waking by 3 hours to wake and feed using measures shown in the hospital. LC discussed checking to make sure new medications are safe to breastfeed. LC discussed alcohol use and cigarette/second hand smoke around baby and breastfeeding and discussed the impact of street drugs on infants and breastfeeding. LC used the page in the breastfeeding guide to discuss harmful effects of these. Breastfeeding/Lactation expectations and anticipatory guidance discussed for the next two weeks . LC discussed nipple care, plugged ducts, engorgement, and breast infection. LC encouraged mom to see pediatrician two days from discharge for follow up. Patient has a breastpump for home use and LC discussed good pumping guidelines and normal expectations with pumping and storage and preparation of ebm for feedings. LC discussed breastfeeding/lactation resources including the local breastfeeding support group after discharge and when to call the doctor. Patient showed good understanding.

## 2025-04-23 NOTE — PLAN OF CARE
Goal Outcome Evaluation:              Outcome Evaluation: Ambulating and voiding well, continuing to monitor pain level and bleeding, incision and fundus WNL, VS WNL, progressing towards care plan goals. RAMÓN RN

## 2025-04-23 NOTE — OUTREACH NOTE
[facility]  IP Postpartum    Questions/Answers      Flowsheet Row Responses   Best Method for Contacting Cell   Support Person Present Yes   Does the patient have a car seat at the hospital Yes   Delivery Note Reviewed Reviewed   Were birth expectations met? Yes   Is there a need for additional support/resources? No   Lactation Note Reviewed Reviewed   Is additional support needed? No   Any questions or concerns? No   Is the patient going to use Meds to Beds? Yes   Any concerns related discharge meds/ability to  prescriptions? No   OB Discharge Navigator Reviewed  Reviewed   Confirm Postpartum OB appointment Yes   Postpartum OB appointment date 25   Confirm initial well-child Pediatrician appointment date/time: No  [following up with Dr Meza]   Additional post-discharge F/U appointments No   Does patient have transportation to appointments? Yes   Any other assistance needed to ensure she is able to attend appointments? No   Does patient have supplies needed at home for  care? Breast Pump, Clothing, Crib, Diapers, Formula            Francia Holloway RN  Maternity Nurse Navigator    2025, 14:35 EDT

## 2025-04-23 NOTE — PLAN OF CARE
Goal Outcome Evaluation:              Outcome Evaluation: Doing well, going home today

## 2025-04-28 ENCOUNTER — TELEPHONE (OUTPATIENT)
Dept: LACTATION | Facility: HOSPITAL | Age: 30
End: 2025-04-28
Payer: COMMERCIAL

## 2025-04-28 NOTE — TELEPHONE ENCOUNTER
LN called to check with this patient and her breastfeeding progress. Patient states she is struggling to get infant to latch on her R breast, even with a nipple shield. Infant will latch to the L breast and she will pump on the R. Patient requested an outpatient lactation visit to work on latching. Scheduled for 4/30/25 @ 1300. Discussed check-in process and encouraged patient to call the lactation office if anything changes. Patient verbalized good understanding.

## 2025-04-29 ENCOUNTER — POSTPARTUM VISIT (OUTPATIENT)
Dept: OBSTETRICS AND GYNECOLOGY | Age: 30
End: 2025-04-29
Payer: COMMERCIAL

## 2025-04-29 VITALS
WEIGHT: 218 LBS | HEART RATE: 107 BPM | BODY MASS INDEX: 37.22 KG/M2 | DIASTOLIC BLOOD PRESSURE: 93 MMHG | SYSTOLIC BLOOD PRESSURE: 120 MMHG | HEIGHT: 64 IN

## 2025-04-29 NOTE — PROGRESS NOTES
"Shruti Pearl here for incision check.  Had RCS one week ago.She denies any complaints.     Vitals:    04/29/25 0920   BP: 120/93   Pulse: 107   Weight: 98.9 kg (218 lb)   Height: 162.6 cm (64\")       Body mass index is 37.42 kg/m².     ABDOMEN: soft, NT; incision healing well without erythema/induration/fluctuance/drainage/bleeding.    Impression  One week s/p PCS    Plan  Follow up in 4 weeks for postpartum visit     Leslie Kessler DO                    "

## 2025-04-29 NOTE — PROGRESS NOTES
"[facility]  Follow-up Visit    Chief Complaint: \"I talked to my doctor about it... an ADHD diagnosis or tested for it.\"     History of Present Illness: Shruti Pearl is a 30 y.o. female who presents today for follow-up. Last seen by this practice: 23 at which time no changes were made to her regimen.     Current treatment regimen includes:   - N/A    Shruti presents on time and unaccompanied in no acute distress and engages with me appropriately. Today she reports she's doing alright, but wants to begin to address potential mood concerns before they manifest as she expects they might given her recent post-partum status. She'd like to begin using lamotrigine again as this has helped her in the past to regulate mood. I discussed with her the prospect of atypical antipsychotics, which may be \"preferred\" during nursing in post-partum periods compared to lamotrigine according to sources; she would prefer to restart lamotrigine despite potential risks.  - sleep: generally adequate outside of demands motherhood ()  - appetite: good  - medication adverse effects: N/A    Interval History and Clinical Commentary:   Ego-syntonic. Shruti presents in a fashion consistent with the spectrum of prior assessments with regard to MSE.    She gave birth almost two weeks ago to a daughter, Lesvia. Rajeev was born 2023, was 8 months post-partum when discovering she was pregnant with her  daughter. She reports during this period that she has also had periods without health insurance, which has complicated follow-up and maintaining care relations.    Her relationship with Anthony is \"super good\"; they met in 2022. Working at [employer] has been good thus far.    Axis I: Bipolar II disorder, ADHD, PTSD  Axis II: defer  Axis III: pregnancy  Axis IV: defer  Axis V: 65    Differential considerations: N/A    Adherence:  Treatment adherence is appropriate; issues in this regard have " included: N/A. Patient is advised not to misuse prescribed medications or to use them with any exogenous substances that aren't disclosed to this provider as they may interact with the regimen to the patient's detriment. The importance of adherence to the recommended treatment and interval follow-up appointments has been emphasized.     Education:  I have counseled Shruti with regard to diagnoses and the recommended treatment regimen as documented below. I have advised that because medications can elicit idiosyncratic reactions in different individuals that SE may present in ways that haven't been discussed. I have reiterated the importance of discussing with me any clinical changes that could represent potential adverse effects regarding the medication regimen.    Patient acknowledges the diagnoses per my rendered interpretation. Patient is agreeable to call 911 or go to the nearest ER should she become concerned for her own safety and/or the safety of those around her. Patient demonstrates understanding of potential risks/benefits/side effects associated with the recommended treatment regimen and is amenable to proceed in the fashion outlined below. Patient has been encouraged to cultivate constructive patterns of living including limiting daily caffeine intake, hydrating appropriately, regularly eating nutritious foods, engaging sleep hygiene practices, engaging in appropriate exposure to sunlight, engaging with hobbies in balance with life necessities, and exercising appropriate to their capacity to do so.    Risk:  There is no significant change to risk profile discernible during today's evaluation - do note that this is subject to change with the Buddhism of new stressors, treatment non-adherence, use of substances, and/or new medical ails. There is no appreciable evidence of intent for harm to self or others. There are no appreciable indices of celia/psychosis.    Contraception and mitigation of potential  teratogenicity: I have advised there are risks taking any medication during pregnancy and, unfortunately, these risks are poorly elaborated due to ethical concerns with studying medications in pregnant women. I have advised that in the case of pregnancy risk may be beyond what I'm able to advise and the general approach is that medication use should only be considered if potential benefits outweigh the possibility of risks by the patient's measure. I have reviewed risks with regard to breast-feeding in the context of medication use and have advised that risks aren't well-characterized, but if medication benefits are believed to outweigh potential risks, which could be severe in some cases, that her nursing child would need to be monitored for changes in alertness and behavior.     Psychotherapy:  - Time: 18 minutes  - interventions employed: the therapeutic alliance was strengthened to encourage the patient to express their thoughts and feelings freely. Esteem building was enhanced through praise, reassurance, normalizing/challenging, and encouragement as appropriate. Coping skills were enhanced to build distress tolerance skills and emotional regulation. Allowed patient to freely discuss issues without interruption or judgement with unconditional positive regard, active listening skills, and empathy. Provided a safe, confidential environment to facilitate the development of a positive therapeutic relationship and encourage open, honest communication. Assisted patient in processing session content; acknowledged and normalized/addressed, as appropriate, patient’s thoughts, feelings, and concerns by utilizing a person-centered approach in efforts to build appropriate rapport and a positive therapeutic relationship with open and honest communication.   - Diagnoses: see assessment and plan below  - Symptoms: see subjective above  - Goals              - patient: improve executive function, maintain mood stability,  maintain effective anxiety management              - provider: challenge patterns of living conducive to pathology, strengthen defenses, promote problems solving, restore adaptive functioning and provide symptom relief.  - Treatment plan: continue supportive psychotherapy in subsequent appointments to provide symptom relief; see assessment and plan below for additional details:              - iteration: 1              - progress: good              - (X)illumination, (X)contextualization, (X)detection, (working)development, (-)elaboration, (-)refinement  - functional status: good  - mental status exam: as below  - prognosis: good     Psychiatric History:  - Pertinent interval changes: N/A  - Psychotropic medication trials: buspirone (poor adherence), escitalopram (diminished effectiveness), lamotrigine, trazodone, aripiprazole, methylphenidate (persistent and intolerable nausea, headache at 10 mg BID)  - Key synopsis: no SA hx; + cutting as recent as 2018     Substance Abuse History:   - Pertinent interval changes: N/A  - Key synopsis: marijuana nightly / usually smokes, but does use edibles episodically; abused alcohol in the past     Social History:  - Pertinent interval changes: denies  - Key synopsis: lives in a house with her sons and daughter, Rajeev Romero Eliana as well as her romantic partner, Anthony; works with [employer]; some college; had speech therapy for stuttering and pronunciation until 5th grade; +neglect hx (especially in adolescence); painting, drawing    Social History     Socioeconomic History    Marital status: Single     Spouse name: Anthony    Number of children: 2    Years of education: 14    Highest education level: High school graduate   Tobacco Use    Smoking status: Former     Current packs/day: 4.00     Average packs/day: 4.0 packs/day for 2.0 years (8.0 ttl pk-yrs)     Types: Cigarettes     Passive exposure: Past    Smokeless tobacco: Never   Vaping Use    Vaping status: Never Used    Substance and Sexual Activity    Alcohol use: Not Currently    Drug use: Not Currently     Types: Marijuana    Sexual activity: Not Currently     Partners: Male     Tobacco use counseling/intervention: N/A, patient does not use tobacco; patient has been counseled with regard to risks of tobacco use.    PHQ-9 Depression Screening  PHQ-9 Total Score:       Little interest or pleasure in doing things?     Feeling down, depressed, or hopeless?     PHQ-2 Total Score     Trouble falling or staying asleep, or sleeping too much?     Feeling tired or having little energy?     Poor appetite or overeating?     Feeling bad about yourself - or that you are a failure or have let yourself or your family down?     Trouble concentrating on things, such as reading the newspaper or watching television?     Moving or speaking so slowly that other people could have noticed? Or the opposite - being so fidgety or restless that you have been moving around a lot more than usual?     Thoughts that you would be better off dead, or of hurting yourself in some way?     PHQ-9 Total Score     If you checked off any problems, how difficult have these problems made it for you to do your work, take care of things at home, or get along with other people?         Change in PHQ-9 since last measure: N/A (0)    LILIAN-7       Change in LILIAN-7 since last measure: N/A (0)    Problem List:  Patient Active Problem List   Diagnosis    Bipolar II disorder    Supervision of other normal pregnancy, antepartum    Previous  section    Hx of  delivery, currently pregnant    History of placental abruption    Abnormal glucose in pregnancy, antepartum    HSV infection    Maternal anemia in pregnancy, antepartum    Normal labor    Hx of  section    Unwanted fertility    Uterine contractions    Attention deficit hyperactivity disorder (ADHD)    Post traumatic stress disorder (PTSD)     Allergy:   Allergies   Allergen Reactions    Nitrofurantoin  Macrocrystal Hives    Penicillins Hives    Erythromycin Unknown - Low Severity      Discontinued Medications:  There are no discontinued medications.    Current Medications:   Current Outpatient Medications   Medication Sig Dispense Refill    acetaminophen (TYLENOL) 325 MG tablet Take 2 tablets by mouth Every 6 (Six) Hours. 30 tablet 1    acyclovir (ZOVIRAX) 400 MG tablet Take 1 tablet by mouth 2 (Two) Times a Day. Take no more than 5 doses a day.      ibuprofen (ADVIL,MOTRIN) 600 MG tablet Take 1 tablet by mouth Every 6 (Six) Hours. 30 tablet 1    omeprazole (priLOSEC) 20 MG capsule Take 1 capsule by mouth Daily.      Prenatal Vit-Fe Fumarate-FA (Prenatal 27-) 27-1 MG tablet tablet Take 1 tablet by mouth Daily. 30 tablet 11    lamoTRIgine (LaMICtal) 25 MG tablet Take 1 tablet by mouth nightly for 2 weeks, then take 2 tablets by mouth nightly for 2 weeks, then take 3 tablets by mouth nightly for 2 weeks, then take 4 tablets by mouth nightly until your next visit 120 tablet 1     No current facility-administered medications for this visit.     Past Medical History:  Past Medical History:   Diagnosis Date    Allergies     Anxiety     Asthma     patient reports she has not had asthma attack in 10 years - allergy induced (pollen)    Bipolar disorder     Depression     HSV infection 2025    H/o of HSV  No recent infections  Valtrex rx sent 25      Kidney stone     Panic disorder     Placental abruption 2014    Premature labor after 22 weeks and before 37 weeks without delivery 2014    PTSD (post-traumatic stress disorder)     Status post  delivery 2014     Past Surgical History:  Past Surgical History:   Procedure Laterality Date     SECTION       SECTION N/A 2023    Procedure:  SECTION REPEAT;  Surgeon: Marine Peterson DO;  Location: [facility] LABOR DELIVERY;  Service: Gynecology;  Laterality: N/A;     SECTION N/A 2025    Procedure:   "SECTION REPEAT;  Surgeon: Oanh Pimentel DO;  Location: [facility] LABOR DELIVERY;  Service: Gynecology;  Laterality: N/A;     Mental Status Exam:   Appearance: well-groomed, sits upright, age-appropriate, post-gestational habitus  Behavior: calm, cooperative, appropriate in demeanor, appropriate eye-contact  Mood/affect: fair / mood-congruent and appropriate in both range and amplitude  Speech: within expected variance; appropriate rate, appropriate rhythm, appropriate tone; non-pressured  Thought Process: linear, goal-directed; no FOI or JOSE; abstraction intact  Thought Content: coherent, devoid of overt delusions/perceptual disturbances  SI/HI: denies both SI and HI; exhibits future-orientation, self-advocates appropriately, no regular self-harm, no appreciable intent  Memory: no overt deficits  Orientation: oriented to person/place/time/situation  Concentration: appropriate during interview  Intellectual capacity: presumptively average  Insight: fair by given history/exam  Judgment: appropriate by given history/exam  Psychomotor: no appreciable latency/retardation/agitation/tremor  Gait: WNL    Review of Systems:  Review of Systems   Constitutional:  Negative for activity change, appetite change and unexpected weight change.   Gastrointestinal:  Negative for abdominal pain and nausea.   Psychiatric/Behavioral:  Negative for agitation and sleep disturbance.      Vital Signs:   /77   Pulse 93   Ht 162.6 cm (64.02\")   Wt 98.9 kg (218 lb)   BMI 37.40 kg/m²      Lab Results:   Admission on 04/20/2025, Discharged on 04/23/2025   Component Date Value Ref Range Status    ABO Type 04/20/2025 B   Final    RH type 04/20/2025 Positive   Final    Antibody Screen 04/20/2025 Negative   Final    T&S Expiration Date 04/20/2025 4/23/2025 11:59:59 PM   Final    WBC 04/20/2025 10.22  3.40 - 10.80 10*3/mm3 Final    RBC 04/20/2025 4.98  3.77 - 5.28 10*6/mm3 Final    Hemoglobin 04/20/2025 12.2  12.0 - 15.9 g/dL Final    " Hematocrit 04/20/2025 38.3  34.0 - 46.6 % Final    MCV 04/20/2025 76.9 (L)  79.0 - 97.0 fL Final    MCH 04/20/2025 24.5 (L)  26.6 - 33.0 pg Final    MCHC 04/20/2025 31.9  31.5 - 35.7 g/dL Final    RDW 04/20/2025 23.8 (H)  12.3 - 15.4 % Final    RDW-SD 04/20/2025 62.3 (H)  37.0 - 54.0 fl Final    MPV 04/20/2025 10.1  6.0 - 12.0 fL Final    Platelets 04/20/2025 218  140 - 450 10*3/mm3 Final    Treponemal AB Total 04/20/2025 Non-Reactive  Non-Reactive Final    THC, Screen, Urine 04/20/2025 Negative  Negative Final    Phencyclidine (PCP), Urine 04/20/2025 Negative  Negative Final    Cocaine Screen, Urine 04/20/2025 Negative  Negative Final    Methamphetamine, Ur 04/20/2025 Negative  Negative Final    Opiate Screen 04/20/2025 Negative  Negative Final    Amphetamine Screen, Urine 04/20/2025 Negative  Negative Final    Benzodiazepine Screen, Urine 04/20/2025 Negative  Negative Final    Tricyclic Antidepressants Screen 04/20/2025 Negative  Negative Final    Methadone Screen, Urine 04/20/2025 Negative  Negative Final    Barbiturates Screen, Urine 04/20/2025 Negative  Negative Final    Oxycodone Screen, Urine 04/20/2025 Negative  Negative Final    Buprenorphine, Screen, Urine 04/20/2025 Negative  Negative Final    Fentanyl, Urine 04/20/2025 Negative  Negative Final    pH, Cord Arterial 04/20/2025 7.29  7.18 - 7.34 pH Units Final    pCO2, Cord Arterial 04/20/2025 51.4 (H)  33.0 - 49.0 mmHg Final    pO2, Cord Arterial 04/20/2025 12.3  mmHg Final    HCO3, Cord Arterial 04/20/2025 24.7  mmol/L Final    Base Exc, Cord Arterial 04/20/2025 -2.5 (L)  -2.0 - 2.0 mmol/L Final    Serial Number: [id number]:  [id number]    O2 Sat, Cord Arterial 04/20/2025 11.2  % Final    Barometric Pressure for Blood Gas 04/20/2025 742.3000  mmHg Final    Case Report 04/21/2025    Final                    Value:Surgical Pathology Report                         Case: [id number]                                  Authorizing Provider:  Oanh Pimentel DO    "   Collected:           04/21/2025 01:42 AM          Ordering Location:     [facility]      Received:            04/21/2025 06:52 AM                                 LABOR AND DELIVERY                                                           Pathologist:           Madhav Thompson MD                                                            Specimens:   1) - Fallopian Tube, Left, partial                                                                  2) - Fallopian Tube, Right                                                                 Clinical Information 04/21/2025    Final                    Value:Unwanted fertility      Final Diagnosis 04/21/2025    Final                    Value:1. Partial left fallopian tube, partial salpingectomy:   - No significant pathologic change      2. Right fallopian tube, tubal ligation:   - No significant pathologic change      Gross Description 04/21/2025    Final                    Value:1. Fallopian Tube, Left.  The specimen is received in 1 formalin filled container labeled \" left fallopian tube; partial\" and consists of a 2.5 cm in length by 0.7 cm in average diameter purple-gray, smooth segment of fallopian tube.  No fimbria are present.  Sectioning reveals an unremarkable, pinpoint lumen.  No distinct lesions are identified.  The specimen is entirely submitted in 1 cassette.  2. Fallopian Tube, Right.  The specimen is received in 1 formalin filled container labeled \" right fallopian tube\" and consists of a 12.0 cm in length by 0.7 cm in average diameter purple-gray, smooth, fimbriated fallopian tube.  Sectioning reveals an unremarkable, pinpoint lumen.  No distinct lesions are identified.  Representative sections are submitted in 1 cassette, to include the entire fimbriae.   JOSE      Microscopic Description 04/21/2025    Final                    Value:Microscopic examination performed.      WBC 04/22/2025 9.39  3.40 - 10.80 10*3/mm3 Final    RBC 04/22/2025 4.22  " 3.77 - 5.28 10*6/mm3 Final    Hemoglobin 04/22/2025 10.6 (L)  12.0 - 15.9 g/dL Final    Hematocrit 04/22/2025 32.8 (L)  34.0 - 46.6 % Final    MCV 04/22/2025 77.7 (L)  79.0 - 97.0 fL Final    MCH 04/22/2025 25.1 (L)  26.6 - 33.0 pg Final    MCHC 04/22/2025 32.3  31.5 - 35.7 g/dL Final    RDW 04/22/2025 23.1 (H)  12.3 - 15.4 % Final    RDW-SD 04/22/2025 62.8 (H)  37.0 - 54.0 fl Final    MPV 04/22/2025 9.5  6.0 - 12.0 fL Final    Platelets 04/22/2025 156  140 - 450 10*3/mm3 Final    Neutrophil % 04/22/2025 76.8 (H)  42.7 - 76.0 % Final    Lymphocyte % 04/22/2025 15.3 (L)  19.6 - 45.3 % Final    Monocyte % 04/22/2025 5.9  5.0 - 12.0 % Final    Eosinophil % 04/22/2025 1.4  0.3 - 6.2 % Final    Basophil % 04/22/2025 0.2  0.0 - 1.5 % Final    Immature Grans % 04/22/2025 0.4  0.0 - 0.5 % Final    Neutrophils, Absolute 04/22/2025 7.21 (H)  1.70 - 7.00 10*3/mm3 Final    Lymphocytes, Absolute 04/22/2025 1.44  0.70 - 3.10 10*3/mm3 Final    Monocytes, Absolute 04/22/2025 0.55  0.10 - 0.90 10*3/mm3 Final    Eosinophils, Absolute 04/22/2025 0.13  0.00 - 0.40 10*3/mm3 Final    Basophils, Absolute 04/22/2025 0.02  0.00 - 0.20 10*3/mm3 Final    Immature Grans, Absolute 04/22/2025 0.04  0.00 - 0.05 10*3/mm3 Final    nRBC 04/22/2025 0.0  0.0 - 0.2 /100 WBC Final    Microcytes 04/22/2025 Slight/1+  None Seen Final    WBC Morphology 04/22/2025 Normal  Normal Final    Large Platelets 04/22/2025 Slight/1+  None Seen Final   Admission on 04/18/2025, Discharged on 04/18/2025   Component Date Value Ref Range Status    Color 04/18/2025 Dark Yellow  Yellow, Straw, Dark Yellow, Candi Final    Clarity, UA 04/18/2025 Clear  Clear Final    Glucose, UA 04/18/2025 Negative  Negative mg/dL Final    Bilirubin 04/18/2025 Negative  Negative Final    Ketones, UA 04/18/2025 40 mg/dL (A)  Negative Final    Specific Gravity  04/18/2025 1.030  1.005 - 1.030 Final    Blood, UA 04/18/2025 Negative  Negative Final    pH, Urine 04/18/2025 5.5  5.0 - 8.0 Final     Protein, POC 04/18/2025 Negative  Negative mg/dL Final    Urobilinogen, UA 04/18/2025 0.2 E.U./dL  Normal, 0.2 E.U./dL Final    Leukocytes 04/18/2025 Trace (A)  Negative Final    Nitrite, UA 04/18/2025 Negative  Negative Final   Routine Prenatal on 04/16/2025   Component Date Value Ref Range Status    Glucose, UA 04/16/2025 Negative  Negative mg/dL Final    Protein, POC 04/16/2025 Negative  Negative mg/dL Final    Urine Culture 04/16/2025 No growth   Final   Clinical Support on 04/09/2025   Component Date Value Ref Range Status    WBC 04/09/2025 8.53  3.40 - 10.80 10*3/mm3 Final    RBC 04/09/2025 4.63  3.77 - 5.28 10*6/mm3 Final    Hemoglobin 04/09/2025 11.0 (L)  12.0 - 15.9 g/dL Final    Hematocrit 04/09/2025 35.8  34.0 - 46.6 % Final    MCV 04/09/2025 77.3 (L)  79.0 - 97.0 fL Final    MCH 04/09/2025 23.8 (L)  26.6 - 33.0 pg Final    MCHC 04/09/2025 30.7 (L)  31.5 - 35.7 g/dL Final    RDW 04/09/2025 22.3 (H)  12.3 - 15.4 % Final    RDW-SD 04/09/2025 58.8 (H)  37.0 - 54.0 fl Final    MPV 04/09/2025 10.5  6.0 - 12.0 fL Final    Platelets 04/09/2025 195  140 - 450 10*3/mm3 Final   Routine Prenatal on 04/08/2025   Component Date Value Ref Range Status    Glucose, UA 04/08/2025 Negative  Negative mg/dL Final    Protein, POC 04/08/2025 Negative  Negative mg/dL Final   Routine Prenatal on 04/04/2025   Component Date Value Ref Range Status    Glucose, UA 04/04/2025 Negative  Negative mg/dL Final    Protein, POC 04/04/2025 Negative  Negative mg/dL Final   Routine Prenatal on 03/27/2025   Component Date Value Ref Range Status    Group B Strep, DNA 03/27/2025 Negative  Negative Final    Glucose, UA 03/27/2025 Negative  Negative mg/dL Final    Protein, POC 03/27/2025 Trace (A)  Negative mg/dL Final   Routine Prenatal on 03/11/2025   Component Date Value Ref Range Status    Glucose, UA 03/11/2025 Negative  Negative mg/dL Final    Protein, POC 03/11/2025 Negative  Negative mg/dL Final    WBC 03/11/2025 9.57  3.40 - 10.80  10*3/mm3 Final    RBC 03/11/2025 4.41  3.77 - 5.28 10*6/mm3 Final    Hemoglobin 03/11/2025 9.9 (L)  12.0 - 15.9 g/dL Final    Hematocrit 03/11/2025 31.8 (L)  34.0 - 46.6 % Final    MCV 03/11/2025 72.1 (L)  79.0 - 97.0 fL Final    MCH 03/11/2025 22.4 (L)  26.6 - 33.0 pg Final    MCHC 03/11/2025 31.1 (L)  31.5 - 35.7 g/dL Final    RDW 03/11/2025 14.9  12.3 - 15.4 % Final    RDW-SD 03/11/2025 39.0  37.0 - 54.0 fl Final    MPV 03/11/2025 10.3  6.0 - 12.0 fL Final    Platelets 03/11/2025 211  140 - 450 10*3/mm3 Final    Iron 03/11/2025 33 (L)  37 - 145 mcg/dL Final    Iron Saturation (TSAT) 03/11/2025 5 (L)  20 - 50 % Final    Transferrin 03/11/2025 461 (H)  200 - 360 mg/dL Final    TIBC 03/11/2025 687 (H)  298 - 536 mcg/dL Final   Routine Prenatal on 02/25/2025   Component Date Value Ref Range Status    Glucose, UA 02/25/2025 Negative  Negative mg/dL Final    Protein, POC 02/25/2025 Negative  Negative mg/dL Final    Urine Culture 02/25/2025 25,000 CFU/mL Normal Urogenital Nicole   Final   Routine Prenatal on 02/07/2025   Component Date Value Ref Range Status    Glucose, UA 02/07/2025 Negative  Negative mg/dL Final    Protein, POC 02/07/2025 Negative  Negative mg/dL Final   There may be more visits with results that are not included.     EKG Results:  No orders to display     Imaging Results:  US Ob Limited 1 + Fetuses  Result Date: 1/8/2025  Impression: Placenta is posterior without signs of previa. Amniotic fluid is subjectively adequate. The internal cervical os is closed. Exam is limited per referring physician order. Electronically Signed: Junior Peter, DO    US Ob Transvaginal  Result Date: 1/8/2025  Impression: Placenta is posterior without signs of previa. Amniotic fluid is subjectively adequate. The internal cervical os is closed. Exam is limited per referring physician order. Electronically Signed: Junior Peter DO    ASSESSMENT AND PLAN:    ICD-10-CM ICD-9-CM   1. Bipolar II disorder  F31.81 296.89   2.  Attention deficit hyperactivity disorder (ADHD), unspecified ADHD type  F90.9 314.01   3. Post traumatic stress disorder (PTSD)  F43.10 309.81     30 y.o. female who presents today for follow-up. We have discussed the interval history and the treatment plan below:      Medication regimen: begin lamotrigine 25 mg HS with plan to titrate   Monitoring: reviewed labs/imaging as populated above  Primary psychotherapy: defer  Follow-up: 6 weeks  Communications: N/A  Treatment plan: due    TREATMENT PLAN/GOALS: challenge patterns of living conducive to symptom burden, implement recommended regimen as above with augmentative, intermittent supportive psychotherapy to reduce symptom burden. Patient acknowledged and verbally consented to continue treatment.      Billing: This encounter is of moderate complexity based on number/complexity of problems addressed today and risk of complications/morbidity: 2+ stable chronic illnesses and and prescription management. Additionally, I provided 18 minutes of dedicated psychotherapy to the patient, distinct from E/M services, as documented above. Start time: 0847. Stop time: 0905.     Electronically signed by Solo Hennessy MD, 04/29/25, 4804

## 2025-04-30 ENCOUNTER — HOSPITAL ENCOUNTER (OUTPATIENT)
Dept: LACTATION | Facility: HOSPITAL | Age: 30
Discharge: HOME | End: 2025-04-30
Payer: COMMERCIAL

## 2025-04-30 ENCOUNTER — OFFICE VISIT (OUTPATIENT)
Dept: PSYCHIATRY | Facility: CLINIC | Age: 30
End: 2025-04-30
Payer: COMMERCIAL

## 2025-04-30 VITALS
BODY MASS INDEX: 37.22 KG/M2 | SYSTOLIC BLOOD PRESSURE: 111 MMHG | WEIGHT: 218 LBS | HEART RATE: 93 BPM | DIASTOLIC BLOOD PRESSURE: 77 MMHG | HEIGHT: 64 IN

## 2025-04-30 DIAGNOSIS — F90.9 ATTENTION DEFICIT HYPERACTIVITY DISORDER (ADHD), UNSPECIFIED ADHD TYPE: ICD-10-CM

## 2025-04-30 DIAGNOSIS — F43.10 POST TRAUMATIC STRESS DISORDER (PTSD): ICD-10-CM

## 2025-04-30 DIAGNOSIS — F31.81 BIPOLAR II DISORDER: Primary | ICD-10-CM

## 2025-04-30 RX ORDER — LAMOTRIGINE 25 MG/1
TABLET ORAL
Qty: 120 TABLET | Refills: 1 | Status: SHIPPED | OUTPATIENT
Start: 2025-04-30

## 2025-04-30 NOTE — LACTATION NOTE
Weighed: 3480 grams    Lesvia is a 10 day-old infant that has been struggling to latch deeply on the left breast and struggling to maintain latch on the right breast. Mom states that during their hospital stay after delivery they were having the same issues and they haven't resolved. LC notes that right nipple is slightly larger and shorter than the left nipple, but still elastic and graspable. Mom states that Lesvia still gets sleepy at the breast after about 10 minutes and sometimes will take about an ounce of a bottle after a sleepy feeding. Mom states that for most feedings Lesvia will nurse on one side and mom pumps the other side getting usually 2-4 ounces.   Lesvia latched on to the left breast without difficulty, but LC did notice that chin and bottom lip were tucked. After small adjustments, mom states that latch is more comfortable. Lesvia was unlatched on left after 3 minutes to prevent her from getting full and refusing the right breast. She transferred 38ml in those 3 minutes. Lesvia latched on the right breast easily, but latch was shallow and lips were tucked. LC demonstrated holding infant slightly different to allow for head tilt and to get the chin deeper into the breast. Mom adjusted and latched again with ease and comfort. Lesvia fed for 14 minutes on right side and transferred 54 ml. Lesvia still showed feeding cues after right breast, so she went back to the left breast and transferred another 34 mL making her total transfer 126mL.    Transferred 126mL    Plan of care:  Increase feeding times to every 3 hours during the day, every 4 hours at night.  Attempt to feed at each breast at each feeding and pump if needed for comfort to help regulate supply.  If supply decreases too much, add pumping session back to plan after 2-3 feedings a day.

## 2025-04-30 NOTE — TREATMENT PLAN
Multi-Disciplinary Problems (from Behavioral Health Treatment Plan)      Active Problems       Problem:  Patient Care Overview (Adult)  Start Date: 04/30/25      Goal Priority Start Date Expected End Date End Date    Plan of Care Review -- 04/30/25 10/29/25 --    Goal Details: Treatment Planning for Shruti    Applicable diagnoses/problems:    - Bipolar disorder: cultivate routines that are conducive to mood stability (set a medication schedule, a sleep schedule, an activity schedule, careful limits on spending and other potential items of impulsivity); implement social accountability to check behaviors and monitor for mood decompensation; encourage psychoeducation.  - progress: fair, maintaining  - frequency of intervention: 4-8 weeks as scheduling allows  - duration of treatment: until optimized functional status is met    - ADHD: practice behaviors that are conducive to creating functional routines (set a medication schedule, a sleep schedule, an activity schedule, limits on spending and other potential items of impulsivity); work towards optimizing a balance of utilizing compensatory mechanisms (including medications), accepting aid from applicable social communities/family, and validation of variable attention-stimulus traits in order to optimize daily functioning.  - progress: fair, maintaining  - frequency of intervention: 4-8 weeks as scheduling allows  - duration of treatment: until optimized functional status is met    - PTSD: reduce salience of trauma history with regard to its impact on thinking and behavior; work towards acceptance of that which has transpired not as acceptable, but has having happened; dampen emotional salience of trigger items via progressive desensitization; enhance motivation and interest with regard to ego-syntonic items of living via routine building and disruption of inhibitory executive cognitive circuits; challenge withdrawal from ego-syntonic items of living; cultivate nikita  and satisfaction via a consistent practice of appreciation.  - progress: good, maintaining  - frequency of intervention: 4-8 weeks as scheduling allows  - duration of treatment: until optimized functional status is met

## 2025-05-01 ENCOUNTER — PATIENT OUTREACH (OUTPATIENT)
Dept: LABOR AND DELIVERY | Facility: HOSPITAL | Age: 30
End: 2025-05-01
Payer: COMMERCIAL

## 2025-05-01 NOTE — OUTREACH NOTE
Motherhood Connection    Postpartum EPDS sent via [employer].    Francia Holloway RN  Maternity Nurse Navigator    5/1/2025, 10:30 EDT

## 2025-05-05 ENCOUNTER — PATIENT OUTREACH (OUTPATIENT)
Dept: LABOR AND DELIVERY | Facility: HOSPITAL | Age: 30
End: 2025-05-05
Payer: COMMERCIAL

## 2025-05-05 NOTE — OUTREACH NOTE
Motherhood Connection  Postpartum Check-In    Questions/Answers      Flowsheet Row Responses   Visit Setting Telephone   Best Method for Contacting Cell   OB Discharge Note Reviewed  Reviewed   OB Discharge Navigator Reviewed  Reviewed   OB Discharge Medications Reviewed  Reviewed    discharged home with mother? Yes   Current Pain Levels 0-10 0   At Rest Pain Levels 0-10 0   Pain level with activity 0-10 0   Acceptable Pain Level 0-10 0   Verbalized Emotional State Acceptance   Family/Support Network Family, Significant Other   Level of Involvement in Care Attentive, Interactive, Supportive   Do you feel comfortable in your relationship with your baby? Yes   Have members of your household adjusted to your baby? Yes   Is the baby's father supportive and/or involved with the baby? Yes   How does your partner feel about the baby? Happy, Involved   Do you feel safe at home, school and work? Yes   Are you in a relationship with someone who threatens you or hurts you? No   Do you have the resources to keep yourself and your baby healthy and safe? Yes   Lochia (per patient report) Brown-miriam Red   Amount Spotting   Number of pads per day 3   Lochia Odor None   Is patient breastfeeding? Yes, not pumping   Postpartum Depression Screening Education Education Provided   Doctor Appointments: Education Provided   Breastfeeding Education Education Provided   Family Planning Education Education Provided   Postpartum Care Education Education Provided   S & S to report Education Provided   Followup Appointments Made Yes   Well Child Visit Appointments Made Yes   Did you complete the visit? Yes   Were there any specific concerns? No   Umbilical Cord No reported signs or symptoms   Infant Feeding Method Breast, Formula   Frequency of feedings q2-4h   Formula Type Other   Other Formula similac advance   Formula PO (mL) 2oz   Formula/Expressed Milk frequency of feedings: PRN   Number of wet diapers x 24 hours 10   Last BM x 24  hours 4   What safe sleep surface is available? Bassinet   Are there stuffed animals, toys, pillows, quilts, blankets, wedges, positioners, bumpers or other loose bedding in the infant's sleeping environment? No   Where does the baby usually sleep? Bassinet   Does the baby ever share a sleep surface with a sibling, adult or pet? No   Does the baby ever share a sleep surface in a bed, couch, recliner or other? No   What position do you place your baby to sleep for naps? Back   What position do you place your baby to sleep at night Back   Are you and/or other caregivers smoking inside or outside the baby's home? No   Is the infant dressed appropiately for the temperature of the home? Yes   Do you use a clean, dry pacifier that is not attached to a string or stuffed animal? --  [sometimes]            Review of Systems   All other systems reviewed and are negative.    Most Recent [place]  Depression Scale Score (EPDS)    Performed by a clinician:    Received via [employer] questionnaire: 0 (2025)     Doing well. No current questions, needs or concerns.     5 Ps Screen  complete      Francia Holloway RN  Maternity Nurse Navigator    2025, 13:45 EDT

## 2025-05-12 ENCOUNTER — PATIENT OUTREACH (OUTPATIENT)
Dept: CALL CENTER | Facility: HOSPITAL | Age: 30
End: 2025-05-12
Payer: COMMERCIAL

## 2025-05-12 NOTE — OUTREACH NOTE
Motherhood Connection Survey      Flowsheet Row Responses   [facility] patient discharged from? [facility]   Week 1 attempt successful? Yes   Call start time 1609   Call end time 1615   Baby sex Girl    discharged home with mother? Yes   Baby sex Girl   Delivery type    Emotional state Acceptance   Family support Yes   Do you have all necessary resources to care for you and your baby?  Yes   Have members of your household adjusted to your baby? Yes   Did you have any problems with pre-eclampsia during this pregnancy? No   Did you have blood glucose issues during this pregnancy No   Lochia amount Light   Lochia per patient report --  [brown]   Did you have an episiotomy/tear/abdominal incision? Yes   Feeding Method Breast   Frequency q 3 hrs   Duration 10 min/side   Pumping Yes   Storage of Milk freezer   Supplementing Formula   Breast Condition No   Nipple Condition No   Nursing Interventions Lactation education provided   Number of wet diapers x 24 hours 8-10   Last BM x 24 hours q 1-2 days   Umbilical Cord No reported signs or symptoms   Where does the baby usually sleep? Bassinet   Are there stuffed animals, toys, pillows, quilts, blankets, wedges, positioners, bumpers or other loose bedding in the infant's sleeping environment? No   Does the baby ever share a sleep surface in a bed, couch, recliner or other? No   What position do you lay your baby down to sleep? Back   Are you and/or other caregivers smoking inside or outside the baby's home? No   Mom appointment comments: pp f/u done   Baby appointment comments: peds visits done, gaining weight   Feeding method comment occasional supplement   Call completed? Yes   How satisfied were you with the Motherhood Connection Program? 5              Anahy DERAS - Registered Nurse

## 2025-05-20 ENCOUNTER — TELEPHONE (OUTPATIENT)
Dept: OBSTETRICS AND GYNECOLOGY | Age: 30
End: 2025-05-20
Payer: COMMERCIAL

## 2025-05-20 RX ORDER — AMOXICILLIN AND CLAVULANATE POTASSIUM 500; 125 MG/1; MG/1
1 TABLET, FILM COATED ORAL 3 TIMES DAILY
Qty: 21 TABLET | Refills: 0 | Status: SHIPPED | OUTPATIENT
Start: 2025-05-20 | End: 2025-05-21 | Stop reason: SDUPTHER

## 2025-05-20 NOTE — TELEPHONE ENCOUNTER
Patient called believing she has mastitis. She is requesting treatment. She first noted some left breast pain late Saturday/early  and then today she has also had fever/chills, headache, left breast swelling, and a red spot that is warm to touch. She delivered by   and is breast feeding. Please advise.

## 2025-05-21 RX ORDER — CEPHALEXIN 500 MG/1
500 CAPSULE ORAL 4 TIMES DAILY
Qty: 40 CAPSULE | Refills: 0 | Status: SHIPPED | OUTPATIENT
Start: 2025-05-21 | End: 2025-05-31

## 2025-05-21 NOTE — TELEPHONE ENCOUNTER
Patient informed. Advised if not improving after 24-48 hours on the medication to call the office.

## 2025-05-21 NOTE — TELEPHONE ENCOUNTER
Hub staff attempted to follow warm transfer process and was unsuccessful     Caller: Shruti Pearl    Relationship to patient: Self    Best call back number: [phone] / LVM    Patient is needing: PT CALLED THIS MORNING STATING THAT SHE DID GET THE amoxicillin-clavulanate (Augmentin) 500-125 MG per tablet BUT SHE IS ALLERGIC TO AMOXICILLIN    CAN SOMETHING ELSE BE CALLED INTO HER PHARMACY    PLEASE CALL THE PT TO CONFIRM    THANK YOU!

## 2025-06-10 ENCOUNTER — TELEPHONE (OUTPATIENT)
Dept: OBSTETRICS AND GYNECOLOGY | Age: 30
End: 2025-06-10

## 2025-06-10 NOTE — TELEPHONE ENCOUNTER
Caller: Shruti Pearl    Relationship:  Self    Best call back number: [phone]    PATIENT CALLED REQUESTING TO CANCEL SAME DAY APPT.    Did the patient call AFTER the start time of their scheduled appointment?  []YES  [x]NO    Was the patient's appointment rescheduled? []YES  [x]NO    Any additional information: PT NEEDS AN AFTERNOON APPT, HUB UNABLE TO FIND AN APPT WITH IN THE TIMEFRAME. PLEASE CALL PT BACK

## 2025-06-16 ENCOUNTER — POSTPARTUM VISIT (OUTPATIENT)
Dept: OBSTETRICS AND GYNECOLOGY | Age: 30
End: 2025-06-16
Payer: COMMERCIAL

## 2025-06-16 VITALS
DIASTOLIC BLOOD PRESSURE: 74 MMHG | HEART RATE: 74 BPM | SYSTOLIC BLOOD PRESSURE: 108 MMHG | HEIGHT: 64 IN | BODY MASS INDEX: 36.54 KG/M2 | WEIGHT: 214 LBS

## 2025-06-16 NOTE — PROGRESS NOTES
"  POSTPARTUM Follow Up Visit      Chief Complaint   Patient presents with    Postpartum Care     6 week follow up on C section      HPI:      Date of delivery: 25  Delivery type:   LTCS, Bilateral salpingectomy          Perineum : Intact  Delivering Provider:   Dr. Oanh Pimentel      Feeding: Bottle  Pain:  No  Vaginal Bleeding:  No  Depressed/Anxious:  No  EPDS score: 1   #10: 0  Plans for BC:  Sterilization/tubal  Last pap date and result: normal with HPV+, plan repeat pap smear with annual in 2025         Postpartum Depression: Low Risk  (2025)    [place]  Depression Scale     Last EPDS Total Score: 1     Last EPDS Self Harm Result: Never       PHYSICAL EXAM:  /74   Pulse 74   Ht 162.6 cm (64.02\")   Wt 97.1 kg (214 lb)   Breastfeeding No   BMI 36.71 kg/m²  Not found.  General- NAD, alert and oriented, appropriate  Psych- Normal mood, good memory, good eye contact  INCISION : Clean, dry, intact, no evidence of infection  Abdomen- Soft, non distended, non tender, no masses  Lymphatic- No palpable groin nodes  Ext- No edema    ASSESSMENT AND PLAN:  Diagnoses and all orders for this visit:    1. Postpartum care following  delivery (Primary)      Counseling:    Ok to resume intercourse  May resume intercourse once 4 weeks postpartum  May resume normal activities  Core strengthening exercises reviewed and recommended  Kegel exercises reviewed and recommended  Ok to return to work/school once patient desires/maternity leave completed      Follow Up:  Return in about 4 months (around 10/16/2025) for Annual exam.    I spent 20 minutes on the separately reported service of postpartum. This time is not included in the time used to support the E/M service also reported today.    Leslie Kessler DO  2025    [facility] [address] [facility]  [address] [place] [address] DR [place]ONEY [id number]  Dept: [phone]  Dept Fax: [phone]  Loc: " [phone]

## 2025-07-09 ENCOUNTER — OFFICE VISIT (OUTPATIENT)
Dept: PSYCHIATRY | Facility: CLINIC | Age: 30
End: 2025-07-09
Payer: COMMERCIAL

## 2025-07-09 VITALS
DIASTOLIC BLOOD PRESSURE: 81 MMHG | HEIGHT: 64 IN | HEART RATE: 77 BPM | WEIGHT: 217.4 LBS | BODY MASS INDEX: 37.11 KG/M2 | SYSTOLIC BLOOD PRESSURE: 122 MMHG

## 2025-07-09 DIAGNOSIS — F90.9 ATTENTION DEFICIT HYPERACTIVITY DISORDER (ADHD), UNSPECIFIED ADHD TYPE: ICD-10-CM

## 2025-07-09 DIAGNOSIS — F31.81 BIPOLAR II DISORDER: Primary | ICD-10-CM

## 2025-07-09 DIAGNOSIS — F43.10 POST TRAUMATIC STRESS DISORDER (PTSD): ICD-10-CM

## 2025-07-09 NOTE — PROGRESS NOTES
"[facility]  Follow-up Visit    Chief Complaint: \"I talked to my doctor about it... an ADHD diagnosis or tested for it.\"     History of Present Illness: Shruti Pearl is a 30 y.o. female who presents today for follow-up. Last seen by this practice:  at which time no changes were made to her regimen.     Current treatment regimen includes:   - lamotrigine (stopped taking this)    Shruti presents on time and unaccompanied in no acute distress and engages with me appropriately. Today she reports she's doing alright, but wants to begin medication to replace lamotrigine. Bipolar mood symptoms are currently manageable, but she wants to be sure they remain this way. She is also interested to continue ADHD treatment, is amenable first to establish a medication for mood.  - sleep: generally adequate outside of demands motherhood ()  - appetite: good; weight stable  - medication adverse effects: insomnia when taken at night, headaches when taken during the day    Interval History and Clinical Commentary:   Ego-syntonic. Shruti presents in a fashion consistent with the spectrum of prior assessments with regard to MSE.    Interval history reported to be generally unremarkable.    Axis I: Bipolar II disorder, ADHD, PTSD  Axis II: defer  Axis III: pregnancy  Axis IV: defer  Axis V: 65    Differential considerations: N/A    Adherence:  Treatment adherence is appropriate; issues in this regard have included: N/A. Patient is advised not to misuse prescribed medications or to use them with any exogenous substances that aren't disclosed to this provider as they may interact with the regimen to the patient's detriment. The importance of adherence to the recommended treatment and interval follow-up appointments has been emphasized.     Education:  I have counseled Shruti with regard to diagnoses and the recommended treatment regimen as documented below: I will prescribe lumateperone for " bipolar disorder. Patient has been advised that this agent has propensity to contribute to EPS (particularly dystonia, tremor, akathisia, and TD), weight gain, dyslipidemia, hyperglycemia, reduced arousal, and somnolence. I have advised that because medications can elicit idiosyncratic reactions in different individuals that SE may present in ways that haven't been discussed. I have reiterated the importance of discussing with me any clinical changes that could represent potential adverse effects regarding the medication regimen.    Patient acknowledges the diagnoses per my rendered interpretation. Patient is agreeable to call 911 or go to the nearest ER should she become concerned for her own safety and/or the safety of those around her. Patient demonstrates understanding of potential risks/benefits/side effects associated with the recommended treatment regimen and is amenable to proceed in the fashion outlined below. Patient has been encouraged to cultivate constructive patterns of living including limiting daily caffeine intake, hydrating appropriately, regularly eating nutritious foods, engaging sleep hygiene practices, engaging in appropriate exposure to sunlight, engaging with hobbies in balance with life necessities, and exercising appropriate to their capacity to do so.    Risk:  There is no significant change to risk profile discernible during today's evaluation - do note that this is subject to change with the Buddhist of new stressors, treatment non-adherence, use of substances, and/or new medical ails. There is no appreciable evidence of intent for harm to self or others. There are no appreciable indices of celia/psychosis.    Contraception and mitigation of potential teratogenicity: I have advised there are risks taking any medication during pregnancy and, unfortunately, these risks are poorly elaborated due to ethical concerns with studying medications in pregnant women. I have advised that in the case  of pregnancy risk may be beyond what I'm able to advise and the general approach is that medication use should only be considered if potential benefits outweigh the possibility of risks by the patient's measure. I have reviewed risks with regard to breast-feeding in the context of medication use and have advised that risks aren't well-characterized, but if medication benefits are believed to outweigh potential risks, which could be severe in some cases, that her nursing child would need to be monitored for changes in alertness and behavior.     Psychotherapy:  - Time: 18 minutes  - interventions employed: the therapeutic alliance was strengthened to encourage the patient to express their thoughts and feelings freely. Esteem building was enhanced through praise, reassurance, normalizing/challenging, and encouragement as appropriate. Coping skills were enhanced to build distress tolerance skills and emotional regulation. Allowed patient to freely discuss issues without interruption or judgement with unconditional positive regard, active listening skills, and empathy. Provided a safe, confidential environment to facilitate the development of a positive therapeutic relationship and encourage open, honest communication. Assisted patient in processing session content; acknowledged and normalized/addressed, as appropriate, patient’s thoughts, feelings, and concerns by utilizing a person-centered approach in efforts to build appropriate rapport and a positive therapeutic relationship with open and honest communication.   - Diagnoses: see assessment and plan below  - Symptoms: see subjective above  - Goals              - patient: improve executive function, maintain mood stability, maintain effective anxiety management              - provider: challenge patterns of living conducive to pathology, strengthen defenses, promote problems solving, restore adaptive functioning and provide symptom relief.  - Treatment plan: continue  supportive psychotherapy in subsequent appointments to provide symptom relief; see assessment and plan below for additional details:              - iteration: 1              - progress: good              - (X)illumination, (X)contextualization, (X)detection, (working)development, (-)elaboration, (-)refinement  - functional status: good  - mental status exam: as below  - prognosis: good     Psychiatric History:  - Pertinent interval changes: N/A  - Psychotropic medication trials: buspirone (poor adherence), escitalopram (diminished effectiveness), lamotrigine, trazodone, aripiprazole (weight gain), methylphenidate (persistent and intolerable nausea, headache at 10 mg BID)  - Key synopsis: no SA hx; + cutting as recent as 2018     Substance Abuse History:   - Pertinent interval changes: N/A  - Key synopsis: marijuana nightly / usually smokes, but does use edibles episodically; abused alcohol in the past     Social History:  - Pertinent interval changes: denies  - Key synopsis: lives in a house with her sons and daughter, Rajeev Romero Eliana as well as her romantic partner, Anthony; works with [employer]; some college; had speech therapy for stuttering and pronunciation until 5th grade; +neglect hx (especially in adolescence); painting, drawing    Social History     Socioeconomic History    Marital status: Single     Spouse name: Anthony    Number of children: 2    Years of education: 14    Highest education level: High school graduate   Tobacco Use    Smoking status: Former     Current packs/day: 4.00     Average packs/day: 4.0 packs/day for 2.0 years (8.0 ttl pk-yrs)     Types: Cigarettes     Passive exposure: Past    Smokeless tobacco: Never   Vaping Use    Vaping status: Never Used   Substance and Sexual Activity    Alcohol use: Not Currently    Drug use: Not Currently     Types: Marijuana    Sexual activity: Not Currently     Partners: Male     Tobacco use counseling/intervention: N/A, patient does not use tobacco;  patient has been counseled with regard to risks of tobacco use.    PHQ-9 Depression Screening  PHQ-9 Total Score:       Little interest or pleasure in doing things?     Feeling down, depressed, or hopeless?     PHQ-2 Total Score     Trouble falling or staying asleep, or sleeping too much?     Feeling tired or having little energy?     Poor appetite or overeating?     Feeling bad about yourself - or that you are a failure or have let yourself or your family down?     Trouble concentrating on things, such as reading the newspaper or watching television?     Moving or speaking so slowly that other people could have noticed? Or the opposite - being so fidgety or restless that you have been moving around a lot more than usual?     Thoughts that you would be better off dead, or of hurting yourself in some way?     PHQ-9 Total Score     If you checked off any problems, how difficult have these problems made it for you to do your work, take care of things at home, or get along with other people?         Change in PHQ-9 since last measure: N/A (0)    LILIAN-7       Change in LILIAN-7 since last measure: N/A (0)    Problem List:  Patient Active Problem List   Diagnosis    Bipolar II disorder    Supervision of other normal pregnancy, antepartum    Previous  section    Hx of  delivery, currently pregnant    History of placental abruption    Abnormal glucose in pregnancy, antepartum    HSV infection    Maternal anemia in pregnancy, antepartum    Normal labor    Hx of  section    Unwanted fertility    Uterine contractions    Attention deficit hyperactivity disorder (ADHD)    Post traumatic stress disorder (PTSD)     Allergy:   Allergies   Allergen Reactions    Nitrofurantoin Macrocrystal Hives    Penicillins Hives    Erythromycin Unknown - Low Severity      Discontinued Medications:  Medications Discontinued During This Encounter   Medication Reason    acetaminophen (TYLENOL) 325 MG tablet     acyclovir (ZOVIRAX)  400 MG tablet     ibuprofen (ADVIL,MOTRIN) 600 MG tablet     omeprazole (priLOSEC) 20 MG capsule     Prenatal Vit-Fe Fumarate-FA (Prenatal 27-) 27-1 MG tablet tablet     lamoTRIgine (LaMICtal) 25 MG tablet      Current Medications:   Current Outpatient Medications   Medication Sig Dispense Refill    Lumateperone Tosylate 21 MG capsule Take 1 capsule by mouth Every Night. 30 capsule 1     No current facility-administered medications for this visit.     Past Medical History:  Past Medical History:   Diagnosis Date    Allergies     Anxiety     Asthma     patient reports she has not had asthma attack in 10 years - allergy induced (pollen)    Bipolar disorder     Depression     HSV infection 2025    H/o of HSV  No recent infections  Valtrex rx sent 25      Kidney stone     Panic disorder     Placental abruption 2014    Premature labor after 22 weeks and before 37 weeks without delivery 2014    PTSD (post-traumatic stress disorder)     Status post  delivery 2014     Past Surgical History:  Past Surgical History:   Procedure Laterality Date     SECTION       SECTION N/A 2023    Procedure:  SECTION REPEAT;  Surgeon: Marine Peterson DO;  Location: [facility] LABOR DELIVERY;  Service: Gynecology;  Laterality: N/A;     SECTION N/A 2025    Procedure:  SECTION REPEAT;  Surgeon: Oanh Pimentel DO;  Location: [facility] LABOR DELIVERY;  Service: Gynecology;  Laterality: N/A;     Mental Status Exam:   Appearance: well-groomed, sits upright, age-appropriate, post-gestational habitus  Behavior: calm, cooperative, appropriate in demeanor, appropriate eye-contact  Mood/affect: fair / mood-congruent and appropriate in both range and amplitude  Speech: within expected variance; appropriate rate, appropriate rhythm, appropriate tone; non-pressured  Thought Process: linear, goal-directed; no FOI or JOSE; abstraction intact  Thought Content: coherent, devoid  "of overt delusions/perceptual disturbances  SI/HI: denies both SI and HI; exhibits future-orientation, self-advocates appropriately, no regular self-harm, no appreciable intent  Memory: no overt deficits  Orientation: oriented to person/place/time/situation  Concentration: appropriate during interview  Intellectual capacity: presumptively average  Insight: fair by given history/exam  Judgment: appropriate by given history/exam  Psychomotor: no appreciable latency/retardation/agitation/tremor  Gait: WNL    *I attest that copied/forwarded information (MSE) has been reviewed and appropriately reflects current patient status and treatment planning.    Review of Systems:  Review of Systems    Vital Signs:   /81   Pulse 77   Ht 162.6 cm (64\")   Wt 98.6 kg (217 lb 6.4 oz)   BMI 37.32 kg/m²      Lab Results:   Admission on 04/20/2025, Discharged on 04/23/2025   Component Date Value Ref Range Status    ABO Type 04/20/2025 B   Final    RH type 04/20/2025 Positive   Final    Antibody Screen 04/20/2025 Negative   Final    T&S Expiration Date 04/20/2025 4/23/2025 11:59:59 PM   Final    WBC 04/20/2025 10.22  3.40 - 10.80 10*3/mm3 Final    RBC 04/20/2025 4.98  3.77 - 5.28 10*6/mm3 Final    Hemoglobin 04/20/2025 12.2  12.0 - 15.9 g/dL Final    Hematocrit 04/20/2025 38.3  34.0 - 46.6 % Final    MCV 04/20/2025 76.9 (L)  79.0 - 97.0 fL Final    MCH 04/20/2025 24.5 (L)  26.6 - 33.0 pg Final    MCHC 04/20/2025 31.9  31.5 - 35.7 g/dL Final    RDW 04/20/2025 23.8 (H)  12.3 - 15.4 % Final    RDW-SD 04/20/2025 62.3 (H)  37.0 - 54.0 fl Final    MPV 04/20/2025 10.1  6.0 - 12.0 fL Final    Platelets 04/20/2025 218  140 - 450 10*3/mm3 Final    Treponemal AB Total 04/20/2025 Non-Reactive  Non-Reactive Final    THC, Screen, Urine 04/20/2025 Negative  Negative Final    Phencyclidine (PCP), Urine 04/20/2025 Negative  Negative Final    Cocaine Screen, Urine 04/20/2025 Negative  Negative Final    Methamphetamine, Ur 04/20/2025 Negative  " Negative Final    Opiate Screen 04/20/2025 Negative  Negative Final    Amphetamine Screen, Urine 04/20/2025 Negative  Negative Final    Benzodiazepine Screen, Urine 04/20/2025 Negative  Negative Final    Tricyclic Antidepressants Screen 04/20/2025 Negative  Negative Final    Methadone Screen, Urine 04/20/2025 Negative  Negative Final    Barbiturates Screen, Urine 04/20/2025 Negative  Negative Final    Oxycodone Screen, Urine 04/20/2025 Negative  Negative Final    Buprenorphine, Screen, Urine 04/20/2025 Negative  Negative Final    Fentanyl, Urine 04/20/2025 Negative  Negative Final    pH, Cord Arterial 04/20/2025 7.29  7.18 - 7.34 pH Units Final    pCO2, Cord Arterial 04/20/2025 51.4 (H)  33.0 - 49.0 mmHg Final    pO2, Cord Arterial 04/20/2025 12.3  mmHg Final    HCO3, Cord Arterial 04/20/2025 24.7  mmol/L Final    Base Exc, Cord Arterial 04/20/2025 -2.5 (L)  -2.0 - 2.0 mmol/L Final    Serial Number: [id number]:  [id number]    O2 Sat, Cord Arterial 04/20/2025 11.2  % Final    Barometric Pressure for Blood Gas 04/20/2025 742.3000  mmHg Final    Case Report 04/21/2025    Final                    Value:Surgical Pathology Report                         Case: [id number]                                  Authorizing Provider:  Oanh Pimentel DO      Collected:           04/21/2025 01:42 AM          Ordering Location:     [facility]      Received:            04/21/2025 06:52 AM                                 LABOR AND DELIVERY                                                           Pathologist:           Madhav Thompson MD                                                            Specimens:   1) - Fallopian Tube, Left, partial                                                                  2) - Fallopian Tube, Right                                                                 Clinical Information 04/21/2025    Final                    Value:Unwanted fertility      Final Diagnosis 04/21/2025    Final       "              Value:1. Partial left fallopian tube, partial salpingectomy:   - No significant pathologic change      2. Right fallopian tube, tubal ligation:   - No significant pathologic change      Gross Description 04/21/2025    Final                    Value:1. Fallopian Tube, Left.  The specimen is received in 1 formalin filled container labeled \" left fallopian tube; partial\" and consists of a 2.5 cm in length by 0.7 cm in average diameter purple-gray, smooth segment of fallopian tube.  No fimbria are present.  Sectioning reveals an unremarkable, pinpoint lumen.  No distinct lesions are identified.  The specimen is entirely submitted in 1 cassette.  2. Fallopian Tube, Right.  The specimen is received in 1 formalin filled container labeled \" right fallopian tube\" and consists of a 12.0 cm in length by 0.7 cm in average diameter purple-gray, smooth, fimbriated fallopian tube.  Sectioning reveals an unremarkable, pinpoint lumen.  No distinct lesions are identified.  Representative sections are submitted in 1 cassette, to include the entire fimbriae.   JOSE      Microscopic Description 04/21/2025    Final                    Value:Microscopic examination performed.      WBC 04/22/2025 9.39  3.40 - 10.80 10*3/mm3 Final    RBC 04/22/2025 4.22  3.77 - 5.28 10*6/mm3 Final    Hemoglobin 04/22/2025 10.6 (L)  12.0 - 15.9 g/dL Final    Hematocrit 04/22/2025 32.8 (L)  34.0 - 46.6 % Final    MCV 04/22/2025 77.7 (L)  79.0 - 97.0 fL Final    MCH 04/22/2025 25.1 (L)  26.6 - 33.0 pg Final    MCHC 04/22/2025 32.3  31.5 - 35.7 g/dL Final    RDW 04/22/2025 23.1 (H)  12.3 - 15.4 % Final    RDW-SD 04/22/2025 62.8 (H)  37.0 - 54.0 fl Final    MPV 04/22/2025 9.5  6.0 - 12.0 fL Final    Platelets 04/22/2025 156  140 - 450 10*3/mm3 Final    Neutrophil % 04/22/2025 76.8 (H)  42.7 - 76.0 % Final    Lymphocyte % 04/22/2025 15.3 (L)  19.6 - 45.3 % Final    Monocyte % 04/22/2025 5.9  5.0 - 12.0 % Final    Eosinophil % 04/22/2025 1.4  0.3 - 6.2 " % Final    Basophil % 04/22/2025 0.2  0.0 - 1.5 % Final    Immature Grans % 04/22/2025 0.4  0.0 - 0.5 % Final    Neutrophils, Absolute 04/22/2025 7.21 (H)  1.70 - 7.00 10*3/mm3 Final    Lymphocytes, Absolute 04/22/2025 1.44  0.70 - 3.10 10*3/mm3 Final    Monocytes, Absolute 04/22/2025 0.55  0.10 - 0.90 10*3/mm3 Final    Eosinophils, Absolute 04/22/2025 0.13  0.00 - 0.40 10*3/mm3 Final    Basophils, Absolute 04/22/2025 0.02  0.00 - 0.20 10*3/mm3 Final    Immature Grans, Absolute 04/22/2025 0.04  0.00 - 0.05 10*3/mm3 Final    nRBC 04/22/2025 0.0  0.0 - 0.2 /100 WBC Final    Microcytes 04/22/2025 Slight/1+  None Seen Final    WBC Morphology 04/22/2025 Normal  Normal Final    Large Platelets 04/22/2025 Slight/1+  None Seen Final   Admission on 04/18/2025, Discharged on 04/18/2025   Component Date Value Ref Range Status    Color 04/18/2025 Dark Yellow  Yellow, Straw, Dark Yellow, Candi Final    Clarity, UA 04/18/2025 Clear  Clear Final    Glucose, UA 04/18/2025 Negative  Negative mg/dL Final    Bilirubin 04/18/2025 Negative  Negative Final    Ketones, UA 04/18/2025 40 mg/dL (A)  Negative Final    Specific Gravity  04/18/2025 1.030  1.005 - 1.030 Final    Blood, UA 04/18/2025 Negative  Negative Final    pH, Urine 04/18/2025 5.5  5.0 - 8.0 Final    Protein, POC 04/18/2025 Negative  Negative mg/dL Final    Urobilinogen, UA 04/18/2025 0.2 E.U./dL  Normal, 0.2 E.U./dL Final    Leukocytes 04/18/2025 Trace (A)  Negative Final    Nitrite, UA 04/18/2025 Negative  Negative Final   Routine Prenatal on 04/16/2025   Component Date Value Ref Range Status    Glucose, UA 04/16/2025 Negative  Negative mg/dL Final    Protein, POC 04/16/2025 Negative  Negative mg/dL Final    Urine Culture 04/16/2025 No growth   Final   Clinical Support on 04/09/2025   Component Date Value Ref Range Status    WBC 04/09/2025 8.53  3.40 - 10.80 10*3/mm3 Final    RBC 04/09/2025 4.63  3.77 - 5.28 10*6/mm3 Final    Hemoglobin 04/09/2025 11.0 (L)  12.0 - 15.9  g/dL Final    Hematocrit 04/09/2025 35.8  34.0 - 46.6 % Final    MCV 04/09/2025 77.3 (L)  79.0 - 97.0 fL Final    MCH 04/09/2025 23.8 (L)  26.6 - 33.0 pg Final    MCHC 04/09/2025 30.7 (L)  31.5 - 35.7 g/dL Final    RDW 04/09/2025 22.3 (H)  12.3 - 15.4 % Final    RDW-SD 04/09/2025 58.8 (H)  37.0 - 54.0 fl Final    MPV 04/09/2025 10.5  6.0 - 12.0 fL Final    Platelets 04/09/2025 195  140 - 450 10*3/mm3 Final   Routine Prenatal on 04/08/2025   Component Date Value Ref Range Status    Glucose, UA 04/08/2025 Negative  Negative mg/dL Final    Protein, POC 04/08/2025 Negative  Negative mg/dL Final   Routine Prenatal on 04/04/2025   Component Date Value Ref Range Status    Glucose, UA 04/04/2025 Negative  Negative mg/dL Final    Protein, POC 04/04/2025 Negative  Negative mg/dL Final   Routine Prenatal on 03/27/2025   Component Date Value Ref Range Status    Group B Strep, DNA 03/27/2025 Negative  Negative Final    Glucose, UA 03/27/2025 Negative  Negative mg/dL Final    Protein, POC 03/27/2025 Trace (A)  Negative mg/dL Final   Routine Prenatal on 03/11/2025   Component Date Value Ref Range Status    Glucose, UA 03/11/2025 Negative  Negative mg/dL Final    Protein, POC 03/11/2025 Negative  Negative mg/dL Final    WBC 03/11/2025 9.57  3.40 - 10.80 10*3/mm3 Final    RBC 03/11/2025 4.41  3.77 - 5.28 10*6/mm3 Final    Hemoglobin 03/11/2025 9.9 (L)  12.0 - 15.9 g/dL Final    Hematocrit 03/11/2025 31.8 (L)  34.0 - 46.6 % Final    MCV 03/11/2025 72.1 (L)  79.0 - 97.0 fL Final    MCH 03/11/2025 22.4 (L)  26.6 - 33.0 pg Final    MCHC 03/11/2025 31.1 (L)  31.5 - 35.7 g/dL Final    RDW 03/11/2025 14.9  12.3 - 15.4 % Final    RDW-SD 03/11/2025 39.0  37.0 - 54.0 fl Final    MPV 03/11/2025 10.3  6.0 - 12.0 fL Final    Platelets 03/11/2025 211  140 - 450 10*3/mm3 Final    Iron 03/11/2025 33 (L)  37 - 145 mcg/dL Final    Iron Saturation (TSAT) 03/11/2025 5 (L)  20 - 50 % Final    Transferrin 03/11/2025 461 (H)  200 - 360 mg/dL Final    TIBC  03/11/2025 687 (H)  298 - 536 mcg/dL Final   Routine Prenatal on 02/25/2025   Component Date Value Ref Range Status    Glucose, UA 02/25/2025 Negative  Negative mg/dL Final    Protein, POC 02/25/2025 Negative  Negative mg/dL Final    Urine Culture 02/25/2025 25,000 CFU/mL Normal Urogenital Nicole   Final   Routine Prenatal on 02/07/2025   Component Date Value Ref Range Status    Glucose, UA 02/07/2025 Negative  Negative mg/dL Final    Protein, POC 02/07/2025 Negative  Negative mg/dL Final   There may be more visits with results that are not included.     EKG Results:  No orders to display     Imaging Results:  No Images in the past 120 days found.    ASSESSMENT AND PLAN:    ICD-10-CM ICD-9-CM   1. Bipolar II disorder  F31.81 296.89   2. Attention deficit hyperactivity disorder (ADHD), unspecified ADHD type  F90.9 314.01   3. Post traumatic stress disorder (PTSD)  F43.10 309.81     30 y.o. female who presents today for follow-up. We have discussed the interval history and the treatment plan below:      Medication regimen: begin lumateperone 21 mg HS  Monitoring: reviewed labs as populated above  Primary psychotherapy: defer  Follow-up: 6 weeks  Communications: N/A  Treatment plan: 04/302025    TREATMENT PLAN/GOALS: challenge patterns of living conducive to symptom burden, implement recommended regimen as above with augmentative, intermittent supportive psychotherapy to reduce symptom burden. Patient acknowledged and verbally consented to continue treatment.      Billing: This encounter is of moderate complexity based on number/complexity of problems addressed today and risk of complications/morbidity: 2+ stable chronic illnesses and and prescription management. Additionally, I provided 18 minutes of dedicated psychotherapy to the patient, distinct from E/M services, as documented above. Start time: 1256. Stop time: 1314.     Electronically signed by Solo Hennessy MD, 07/09/25, 1315

## 2025-07-10 ENCOUNTER — TELEPHONE (OUTPATIENT)
Dept: PSYCHIATRY | Facility: CLINIC | Age: 30
End: 2025-07-10
Payer: COMMERCIAL

## 2025-07-10 DIAGNOSIS — F31.81 BIPOLAR II DISORDER: Primary | ICD-10-CM

## 2025-07-10 NOTE — TELEPHONE ENCOUNTER
A PRIOR AUTHORIZATION HAS BEEN INITIATED, PROCESSED AND SENT TO PLAN FOR THIS PATIENT.  AWAITING RESPONSE FROM INSURANCE.        ARIPIPRAZOLE TABLETS  X    ASENAPINE SUBLINGUAL TABLETS    CLOZAPINE TABLETS    LURASIDONE TABLETS    OLANZAPINE TABLETS    OLANZAPINE ORALLY DISINTEGRATING TABLETS    QUETIAPINE TABLETS    QUETIAPINE EXTENDED-RELEASE TABLETS    RISPERIDONE    VRAYLAR CAPSULES    ZIPRASIDONE CAPSULES

## 2025-07-14 NOTE — TELEPHONE ENCOUNTER
I would recommend either Abilify (aripiprazole) or lurasidone (Latuda) if she's willing to try either of these agents.

## 2025-07-15 RX ORDER — LURASIDONE HYDROCHLORIDE 20 MG/1
20 TABLET, FILM COATED ORAL
Qty: 30 TABLET | Refills: 1 | Status: SHIPPED | OUTPATIENT
Start: 2025-07-15

## 2025-07-17 ENCOUNTER — TELEPHONE (OUTPATIENT)
Dept: PSYCHIATRY | Facility: CLINIC | Age: 30
End: 2025-07-17
Payer: COMMERCIAL

## 2025-08-26 ENCOUNTER — TELEMEDICINE (OUTPATIENT)
Dept: PSYCHIATRY | Facility: CLINIC | Age: 30
End: 2025-08-26
Payer: COMMERCIAL

## 2025-08-26 DIAGNOSIS — F90.9 ATTENTION DEFICIT HYPERACTIVITY DISORDER (ADHD), UNSPECIFIED ADHD TYPE: Primary | ICD-10-CM

## 2025-08-26 DIAGNOSIS — F31.81 BIPOLAR II DISORDER: ICD-10-CM

## 2025-08-26 DIAGNOSIS — F43.10 POST TRAUMATIC STRESS DISORDER (PTSD): ICD-10-CM

## 2025-08-26 RX ORDER — DEXTROAMPHETAMINE SACCHARATE, AMPHETAMINE ASPARTATE MONOHYDRATE, DEXTROAMPHETAMINE SULFATE AND AMPHETAMINE SULFATE 2.5; 2.5; 2.5; 2.5 MG/1; MG/1; MG/1; MG/1
10 CAPSULE, EXTENDED RELEASE ORAL EVERY MORNING
Qty: 30 CAPSULE | Refills: 0 | Status: SHIPPED | OUTPATIENT
Start: 2025-08-26

## 2025-08-26 RX ORDER — LURASIDONE HYDROCHLORIDE 20 MG/1
20 TABLET, FILM COATED ORAL
Qty: 90 TABLET | Refills: 0 | Status: SHIPPED | OUTPATIENT
Start: 2025-08-26

## (undated) DEVICE — GLV SURG BIOGEL LTX PF 7

## (undated) DEVICE — GLV SURG BIOGEL LTX PF 6 1/2

## (undated) DEVICE — INTENDED FOR TISSUE SEPARATION, AND OTHER PROCEDURES THAT REQUIRE A SHARP SURGICAL BLADE TO PUNCTURE OR CUT.: Brand: BARD-PARKER ® CARBON RIB-BACK BLADES

## (undated) DEVICE — SUT GUT CHRM 3/0 CT1 27IN 810H

## (undated) DEVICE — THE STERILE LIGHT HANDLE COVER IS USED WITH STERIS SURGICAL LIGHTING AND VISUALIZATION SYSTEMS.

## (undated) DEVICE — PAD GRND REM POLYHESIVE A/ DISP

## (undated) DEVICE — SUT VICRYL 3/0 J663H

## (undated) DEVICE — C SECTION PACK: Brand: MEDLINE INDUSTRIES, INC.

## (undated) DEVICE — DEV OPN LIGASURE SM/JAW 28D 16.5MM 18.8CM 1P/U

## (undated) DEVICE — DEV TRANSF BLD W/LUER ADPT CA/198

## (undated) DEVICE — TRY CATH FOL ADVANCE SIL W/BAG 16F

## (undated) DEVICE — NEEDLE,18GX1.5",REG,BEVEL: Brand: MEDLINE

## (undated) DEVICE — SUT VIC 0 CTX 36IN J978H

## (undated) DEVICE — VIOLET BRAIDED (POLYGLACTIN 910), SYNTHETIC ABSORBABLE SUTURE: Brand: COATED VICRYL

## (undated) DEVICE — CVR HNDL LT SURG ACCSSRY BLU STRL

## (undated) DEVICE — SUT MNCRYL 3/0 SH 27IN DYED Y316H

## (undated) DEVICE — Device: Brand: PORTEX

## (undated) DEVICE — 3M™ STERI-STRIP™ BLEND TONE SKIN CLOSURES, B1557, TAN, 1/2 IN X 4 IN (12MM X 100MM), 6 STRIPS/ENVELOPE: Brand: 3M™ STERI-STRIP™

## (undated) DEVICE — Device